# Patient Record
Sex: FEMALE | Race: WHITE | NOT HISPANIC OR LATINO | Employment: OTHER | URBAN - METROPOLITAN AREA
[De-identification: names, ages, dates, MRNs, and addresses within clinical notes are randomized per-mention and may not be internally consistent; named-entity substitution may affect disease eponyms.]

---

## 2022-12-06 ENCOUNTER — OFFICE VISIT (OUTPATIENT)
Dept: FAMILY MEDICINE | Facility: CLINIC | Age: 87
End: 2022-12-06
Payer: MEDICARE

## 2022-12-06 VITALS — DIASTOLIC BLOOD PRESSURE: 74 MMHG | SYSTOLIC BLOOD PRESSURE: 136 MMHG | HEART RATE: 67 BPM | OXYGEN SATURATION: 93 %

## 2022-12-06 DIAGNOSIS — M79.89 LEG SWELLING: ICD-10-CM

## 2022-12-06 DIAGNOSIS — F33.0 MILD EPISODE OF RECURRENT MAJOR DEPRESSIVE DISORDER: ICD-10-CM

## 2022-12-06 DIAGNOSIS — F03.90 DEMENTIA, UNSPECIFIED DEMENTIA SEVERITY, UNSPECIFIED DEMENTIA TYPE, UNSPECIFIED WHETHER BEHAVIORAL, PSYCHOTIC, OR MOOD DISTURBANCE OR ANXIETY: ICD-10-CM

## 2022-12-06 DIAGNOSIS — B37.2 CANDIDIASIS OF SKIN: ICD-10-CM

## 2022-12-06 DIAGNOSIS — R42 DIZZINESS: ICD-10-CM

## 2022-12-06 DIAGNOSIS — E03.9 ACQUIRED HYPOTHYROIDISM: ICD-10-CM

## 2022-12-06 DIAGNOSIS — E66.01 MORBID OBESITY DUE TO EXCESS CALORIES: ICD-10-CM

## 2022-12-06 DIAGNOSIS — Z00.00 ANNUAL PHYSICAL EXAM: Primary | ICD-10-CM

## 2022-12-06 PROCEDURE — 99205 OFFICE O/P NEW HI 60 MIN: CPT | Mod: S$GLB,,, | Performed by: NURSE PRACTITIONER

## 2022-12-06 PROCEDURE — 99205 PR OFFICE/OUTPT VISIT, NEW, LEVL V, 60-74 MIN: ICD-10-PCS | Mod: S$GLB,,, | Performed by: NURSE PRACTITIONER

## 2022-12-06 RX ORDER — METOPROLOL SUCCINATE 50 MG/1
50 TABLET, EXTENDED RELEASE ORAL DAILY
COMMUNITY
End: 2023-09-13 | Stop reason: ALTCHOICE

## 2022-12-06 RX ORDER — ESCITALOPRAM OXALATE 20 MG/1
20 TABLET ORAL DAILY
COMMUNITY
End: 2023-09-07 | Stop reason: SDUPTHER

## 2022-12-06 RX ORDER — LEVOTHYROXINE SODIUM 200 UG/1
200 TABLET ORAL
COMMUNITY
End: 2023-06-08 | Stop reason: SDUPTHER

## 2022-12-06 RX ORDER — POTASSIUM CHLORIDE 1500 MG/1
15 TABLET, EXTENDED RELEASE ORAL DAILY
COMMUNITY
End: 2023-10-10 | Stop reason: SDUPTHER

## 2022-12-06 RX ORDER — NYSTATIN 100000 [USP'U]/G
POWDER TOPICAL 4 TIMES DAILY
Qty: 120 G | Refills: 5 | Status: SHIPPED | OUTPATIENT
Start: 2022-12-06 | End: 2023-05-01 | Stop reason: SDUPTHER

## 2022-12-06 RX ORDER — FUROSEMIDE 20 MG/1
20 TABLET ORAL DAILY
COMMUNITY
End: 2023-04-21

## 2022-12-06 RX ORDER — MULTIVITAMIN
1 TABLET ORAL DAILY
COMMUNITY

## 2022-12-06 RX ORDER — MECLIZINE HYDROCHLORIDE 25 MG/1
25 TABLET ORAL 3 TIMES DAILY PRN
Qty: 60 TABLET | Refills: 2 | Status: SHIPPED | OUTPATIENT
Start: 2022-12-06

## 2022-12-06 RX ORDER — CALCIUM CARBONATE 600 MG
600 TABLET ORAL DAILY
COMMUNITY

## 2022-12-06 NOTE — PROGRESS NOTES
"  SUBJECTIVE:    Patient ID: Ghazala Hagen is a 87 y.o. female.    Chief Complaint: Establish Care (Bottles brought//Pt is here to get establish care with PCP//Pt already had her flu vaccine at [facility] in the past 4 months.//Discuss pna vaccine//Pt decline weight check//SCOTT )      Pt here for new pt appt. Former pt of PCP in [place]    Moved from [place] 4 months ago and now and living at [facility]- pt here with her granddaughter and grandson. Was living with daughter in [place] until her son in law got sick. Pt reports she's hopeful she can move back to [place] though family states this is not likely    Pt answers most of the questions however grandchildren tend to not agree with her answers. Her daughter who is a nurse sent in a note with family listing multiple c/o's- chronic leg swelling not much improved with lasix daily, chronic yeast infection under breasts/stomach, anxiety and lack of interest/motivation. 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 Family reports visual hallucinations though pt denies. Pt seems to disagree when family tries to report any symptoms. Pt reports she sleeps well and pt/family deny any behavior issues or agitation    Pt reports ambulates short distances with walker inside her apartment otherwise uses wheelchair. Stays in her room most of the day and sits in her recliner watching TV. Reports has no interest in participating in group activities or eating in dining mendes. Denies any falls this past year. Pt admits to chronic depression since her  . Has been on lexapro 20mg daily for years and she doesn't want to change medications.    Pt reports chronic rash to lower abdominal and breast folds- pt tells me former PCP treated her with oral diflucan "which didn't help at all". Reports using nystatin powder which has helped some    Has [facility] coming out weekly- hit dorsum of left foot on walker about 1 week ago and has small skin " tear      No visits with results within 6 Month(s) from this visit.   Latest known visit with results is:   No results found for any previous visit.       Past Medical History:   Diagnosis Date    Hypertension     Thyroid disease      Past Surgical History:   Procedure Laterality Date    CHOLECYSTECTOMY      HIP SURGERY Left     s/p CHRIS complicated by joint infection/spacer    knee replacement Right     KNEE SURGERY Left     TKR    SHOULDER SURGERY Right     TONSILLECTOMY       Family History   Problem Relation Age of Onset    COPD Mother     Cancer Father        Marital Status:   Alcohol History:  reports that she does not currently use alcohol.  Tobacco History:  reports that she has never smoked. She has never used smokeless tobacco.  Drug History:  reports no history of drug use.    Review of patient's allergies indicates:  No Known Allergies    Current Outpatient Medications:     calcium carbonate (OS-LYNNETTE) 600 mg calcium (1,500 mg) Tab, Take 600 mg by mouth once daily., Disp: , Rfl:     EScitalopram oxalate (LEXAPRO) 20 MG tablet, Take 20 mg by mouth once daily., Disp: , Rfl:     furosemide (LASIX) 20 MG tablet, Take 20 mg by mouth once daily., Disp: , Rfl:     KRILL OIL ORAL, Take by mouth., Disp: , Rfl:     levothyroxine (SYNTHROID) 200 MCG tablet, Take 200 mcg by mouth before breakfast., Disp: , Rfl:     metoprolol succinate (TOPROL-XL) 50 MG 24 hr tablet, Take 50 mg by mouth once daily., Disp: , Rfl:     multivitamin (ONE DAILY MULTIVITAMIN) per tablet, Take 1 tablet by mouth once daily., Disp: , Rfl:     potassium chloride (K-TAB) 20 mEq, Take 15 mEq by mouth once daily., Disp: , Rfl:     meclizine (ANTIVERT) 25 mg tablet, Take 1 tablet (25 mg total) by mouth 3 (three) times daily as needed for Dizziness., Disp: 60 tablet, Rfl: 2    nystatin (MYCOSTATIN) powder, Apply topically 4 (four) times daily., Disp: 120 g, Rfl: 5    Review of Systems   Constitutional:  Negative for appetite change, chills and  fever.   HENT:  Negative for sore throat and trouble swallowing.    Eyes:  Negative for visual disturbance.   Respiratory:  Negative for cough, shortness of breath and wheezing.    Cardiovascular:  Positive for leg swelling. Negative for chest pain and palpitations.   Gastrointestinal:  Negative for abdominal pain, constipation and diarrhea.   Genitourinary:  Positive for dysuria (only if she drinks soft drinks, previously diagnosed with IC). Negative for frequency, hematuria and urgency.   Musculoskeletal:  Negative for back pain and gait problem.   Skin:  Negative for rash.   Neurological:  Positive for dizziness (reports chronic dizziness controlled with taking meclizine once or twice daily). Negative for syncope, speech difficulty, numbness and headaches.   Psychiatric/Behavioral:  Positive for confusion and dysphoric mood. Negative for agitation, behavioral problems and sleep disturbance. The patient is not nervous/anxious.         Objective:      Vitals:    12/06/22 1454   BP: 136/74   Pulse: 67   SpO2: (!) 93%     Physical Exam  Vitals reviewed.   Constitutional:       General: She is not in acute distress.     Appearance: She is well-developed.      Comments: Morbidly obese elderly WF sitting in WC   HENT:      Head: Normocephalic and atraumatic.      Right Ear: Tympanic membrane and ear canal normal.      Left Ear: Tympanic membrane and ear canal normal.      Mouth/Throat:      Pharynx: No posterior oropharyngeal erythema.   Neck:      Vascular: No carotid bruit.   Cardiovascular:      Rate and Rhythm: Normal rate and regular rhythm.      Pulses:           Dorsalis pedis pulses are 2+ on the right side and 2+ on the left side.      Heart sounds: No murmur heard.  Pulmonary:      Effort: Pulmonary effort is normal. No respiratory distress.      Breath sounds: Normal breath sounds. No wheezing or rales.   Abdominal:      General: There is no distension.      Palpations: Abdomen is soft.      Tenderness: There  "is no abdominal tenderness.   Musculoskeletal:      Cervical back: Neck supple.      Right lower leg: Edema (2+ pitting edema bilat lower legs from mid calves to feet, no erythema) present.      Left lower leg: Edema present.        Feet:    Feet:      Right foot:      Skin integrity: No ulcer or erythema.      Toenail Condition: Right toenails are abnormally thick and long.      Left foot:      Skin integrity: No ulcer or erythema.      Toenail Condition: Left toenails are abnormally thick and long.   Lymphadenopathy:      Cervical: No cervical adenopathy.   Skin:     General: Skin is warm and dry.      Findings: Rash present.          Neurological:      General: No focal deficit present.      Mental Status: She is alert.      Gait: Gait normal.   Psychiatric:         Mood and Affect: Mood normal.         Behavior: Behavior normal.         Thought Content: Thought content is not paranoid or delusional.         Cognition and Memory: Cognition is impaired.      Comments: Pt pleasant though adamant during visit that her family not contradict her and states she can "speak for herself'     MINI-MENTAL STATE EXAM    What is the (year), (season), (date), (day), (month)?5 points  Where are we (state), ([place]), (town), (hospital), (floor)? 4 points  Name 3 objects: 1 second to say each, then ask the patient to repeat all three after you have said them.  Repeat initially until he/she learns all three. 3 points  Serial 7's. 1 point for each answer, stop after 5.  Alternatively, spell "world" backwards.  Must be in the correct sequence.  5 points  Show 2 common objects (pencil and watch).  Ask patient to name. 2 points  Ask the patient to repeat No ifs, ands or buts. 1 point  Follow a 3 stage command: "Take this paper in your right hand, fold it in half, and put it on the floor". 3 points  Read and obey: "Close your eyes". 1 poinr  Ask the patient to recall the three words you previously asked. 1 points  Give pt. paper and " ask to write a sentence. 1 point  Show pt. drawing of intersecting pentagons. Ask pt. to draw. 0 point  What was the mini mental exam score? 26 /30  Level of consciousness: alert  Describe if abnormal:   Fund of knowledge: Normal  General appearance: Normal      Assessment:       1. Annual physical exam    2. Leg swelling    3. Dementia, unspecified dementia severity, unspecified dementia type, unspecified whether behavioral, psychotic, or mood disturbance or anxiety    4. Acquired hypothyroidism    5. Mild episode of recurrent major depressive disorder    6. Candidiasis of skin    7. Dizziness    8. Morbid obesity due to excess calories           Plan:       Annual physical exam  Comments:  baseline labs ordered    Leg swelling  Comments:  discussed imp of elevation with pt/family- will check labs but for now continue with lasix 20mg daily, recommend compression socks  Orders:  -     CBC Auto Differential; Future; Expected date: 12/06/2022  -     Comprehensive Metabolic Panel; Future; Expected date: 12/06/2022  -     Lipid Panel; Future; Expected date: 12/06/2022  -     Microalbumin/Creatinine Ratio, Urine; Future; Expected date: 12/06/2022  -     Urinalysis, Reflex to Urine Culture Urine, Clean Catch; Future; Expected date: 12/06/2022    Dementia, unspecified dementia severity, unspecified dementia type, unspecified whether behavioral, psychotic, or mood disturbance or anxiety  Comments:  MMSE 26/30 today. discussed with pt/family adding donepezil and/or neuro referral- pt decliens any medication though family woudl like to discuss further     Acquired hypothyroidism  Comments:  labs ordered  Orders:  -     CBC Auto Differential; Future; Expected date: 12/06/2022  -     Comprehensive Metabolic Panel; Future; Expected date: 12/06/2022  -     Lipid Panel; Future; Expected date: 12/06/2022  -     Microalbumin/Creatinine Ratio, Urine; Future; Expected date: 12/06/2022  -     Urinalysis, Reflex to Urine Culture Urine,  Clean Catch; Future; Expected date: 12/06/2022  -     TSH; Future; Expected date: 12/06/2022  -     T4, Free; Future; Expected date: 12/06/2022    Mild episode of recurrent major depressive disorder  Comments:  pt admits to ongoing depression, discussed adding additional antidepressant vs switching her to another med, she declines either    Candidiasis of skin  Comments:  pt declines diflucan, recommend cleaning with soap/water daily and dry skin well. nystatin powder prn and if really excoriated use aron's cream  Orders:  -     nystatin (MYCOSTATIN) powder; Apply topically 4 (four) times daily.  Dispense: 120 g; Refill: 5    Dizziness  Comments:  reports chronic dizziness controlled with meclizine  Orders:  -     meclizine (ANTIVERT) 25 mg tablet; Take 1 tablet (25 mg total) by mouth 3 (three) times daily as needed for Dizziness.  Dispense: 60 tablet; Refill: 2    Morbid obesity due to excess calories  Comments:  encouraged pt to increase activity by walking to dining mendes, etc    Follow up in about 6 weeks (around 1/17/2023) for thyroid/depression/leg swelling.        12/6/2022 Ivanna Cutler NP

## 2022-12-16 ENCOUNTER — TELEPHONE (OUTPATIENT)
Dept: FAMILY MEDICINE | Facility: CLINIC | Age: 87
End: 2022-12-16

## 2022-12-16 ENCOUNTER — LAB VISIT (OUTPATIENT)
Dept: LAB | Facility: HOSPITAL | Age: 87
End: 2022-12-16
Attending: NURSE PRACTITIONER
Payer: MEDICARE

## 2022-12-16 DIAGNOSIS — M15.0 PRIMARY GENERALIZED HYPERTROPHIC OSTEOARTHROSIS: Primary | ICD-10-CM

## 2022-12-16 DIAGNOSIS — I87.2 PERIPHERAL VENOUS INSUFFICIENCY: ICD-10-CM

## 2022-12-16 DIAGNOSIS — I51.9 MYXEDEMA HEART DISEASE: ICD-10-CM

## 2022-12-16 DIAGNOSIS — E03.9 MYXEDEMA HEART DISEASE: ICD-10-CM

## 2022-12-16 PROCEDURE — 84439 ASSAY OF FREE THYROXINE: CPT | Performed by: NURSE PRACTITIONER

## 2022-12-16 PROCEDURE — 80053 COMPREHEN METABOLIC PANEL: CPT | Performed by: NURSE PRACTITIONER

## 2022-12-16 PROCEDURE — 80061 LIPID PANEL: CPT | Performed by: NURSE PRACTITIONER

## 2022-12-16 PROCEDURE — 84443 ASSAY THYROID STIM HORMONE: CPT | Performed by: NURSE PRACTITIONER

## 2022-12-16 PROCEDURE — 85025 COMPLETE CBC W/AUTO DIFF WBC: CPT | Performed by: NURSE PRACTITIONER

## 2022-12-16 NOTE — TELEPHONE ENCOUNTER
----- Message from Dalia Garcia sent at 12/16/2022 11:33 AM CST -----   9:20 Chantelle with Home [facility]. She was trying to get a urine from her. She not get one. She will go out later and give her a hat and go back Monday pt's #  [phone] GH

## 2022-12-17 LAB
ALBUMIN SERPL BCP-MCNC: 3.3 G/DL (ref 3.5–5.2)
ALP SERPL-CCNC: 58 U/L (ref 55–135)
ALT SERPL W/O P-5'-P-CCNC: 9 U/L (ref 10–44)
ANION GAP SERPL CALC-SCNC: 13 MMOL/L (ref 8–16)
AST SERPL-CCNC: 19 U/L (ref 10–40)
BASOPHILS # BLD AUTO: 0.02 K/UL (ref 0–0.2)
BASOPHILS NFR BLD: 0.3 % (ref 0–1.9)
BILIRUB SERPL-MCNC: 0.5 MG/DL (ref 0.1–1)
BUN SERPL-MCNC: 7 MG/DL (ref 8–23)
CALCIUM SERPL-MCNC: 9 MG/DL (ref 8.7–10.5)
CHLORIDE SERPL-SCNC: 102 MMOL/L (ref 95–110)
CHOLEST SERPL-MCNC: 149 MG/DL (ref 120–199)
CHOLEST/HDLC SERPL: 3.5 {RATIO} (ref 2–5)
CO2 SERPL-SCNC: 26 MMOL/L (ref 23–29)
CREAT SERPL-MCNC: 0.6 MG/DL (ref 0.5–1.4)
DIFFERENTIAL METHOD: ABNORMAL
EOSINOPHIL # BLD AUTO: 0.1 K/UL (ref 0–0.5)
EOSINOPHIL NFR BLD: 1.6 % (ref 0–8)
ERYTHROCYTE [DISTWIDTH] IN BLOOD BY AUTOMATED COUNT: 17.6 % (ref 11.5–14.5)
EST. GFR  (NO RACE VARIABLE): >60 ML/MIN/1.73 M^2
GLUCOSE SERPL-MCNC: 107 MG/DL (ref 70–110)
HCT VFR BLD AUTO: 39.1 % (ref 37–48.5)
HDLC SERPL-MCNC: 43 MG/DL (ref 40–75)
HDLC SERPL: 28.9 % (ref 20–50)
HGB BLD-MCNC: 11.5 G/DL (ref 12–16)
IMM GRANULOCYTES # BLD AUTO: 0.03 K/UL (ref 0–0.04)
IMM GRANULOCYTES NFR BLD AUTO: 0.4 % (ref 0–0.5)
LDLC SERPL CALC-MCNC: 92.6 MG/DL (ref 63–159)
LYMPHOCYTES # BLD AUTO: 1.6 K/UL (ref 1–4.8)
LYMPHOCYTES NFR BLD: 23.2 % (ref 18–48)
MCH RBC QN AUTO: 27.4 PG (ref 27–31)
MCHC RBC AUTO-ENTMCNC: 29.4 G/DL (ref 32–36)
MCV RBC AUTO: 93 FL (ref 82–98)
MONOCYTES # BLD AUTO: 0.7 K/UL (ref 0.3–1)
MONOCYTES NFR BLD: 9.6 % (ref 4–15)
NEUTROPHILS # BLD AUTO: 4.5 K/UL (ref 1.8–7.7)
NEUTROPHILS NFR BLD: 64.9 % (ref 38–73)
NONHDLC SERPL-MCNC: 106 MG/DL
NRBC BLD-RTO: 0 /100 WBC
PLATELET # BLD AUTO: 272 K/UL (ref 150–450)
PMV BLD AUTO: 10.9 FL (ref 9.2–12.9)
POTASSIUM SERPL-SCNC: 4.1 MMOL/L (ref 3.5–5.1)
PROT SERPL-MCNC: 6.6 G/DL (ref 6–8.4)
RBC # BLD AUTO: 4.19 M/UL (ref 4–5.4)
SODIUM SERPL-SCNC: 141 MMOL/L (ref 136–145)
T4 FREE SERPL-MCNC: 1.24 NG/DL (ref 0.71–1.51)
TRIGL SERPL-MCNC: 67 MG/DL (ref 30–150)
TSH SERPL DL<=0.005 MIU/L-ACNC: 1.03 UIU/ML (ref 0.4–4)
WBC # BLD AUTO: 6.91 K/UL (ref 3.9–12.7)

## 2022-12-19 ENCOUNTER — DOCUMENT SCAN (OUTPATIENT)
Dept: HOME HEALTH SERVICES | Facility: HOSPITAL | Age: 87
End: 2022-12-19
Payer: MEDICARE

## 2022-12-19 ENCOUNTER — TELEPHONE (OUTPATIENT)
Dept: FAMILY MEDICINE | Facility: CLINIC | Age: 87
End: 2022-12-19

## 2022-12-19 NOTE — TELEPHONE ENCOUNTER
----- Message from Ivanna Frye sent at 12/19/2022  9:08 AM CST -----  Contact: Sydney Mckeon does want Ivanna to take over her medications. Pt has a sore on the back of her left leg by her knee. Can you have HH look at that for her. Sydney @[phone]

## 2022-12-19 NOTE — TELEPHONE ENCOUNTER
Granddaughter states pt is feeling dizzy and very sick.states she has ear pain  Advised she needs to go to . We cant just send something in. Sydney voiced understanding.

## 2022-12-21 ENCOUNTER — TELEPHONE (OUTPATIENT)
Dept: FAMILY MEDICINE | Facility: CLINIC | Age: 87
End: 2022-12-21

## 2022-12-21 NOTE — TELEPHONE ENCOUNTER
Spoke with pts granddaughter Sydney and let her know per Dr. Hernandes all labs from  looked normal.

## 2022-12-30 PROCEDURE — G0179 PR HOME HEALTH MD RECERTIFICATION: ICD-10-PCS | Mod: ,,, | Performed by: NURSE PRACTITIONER

## 2022-12-30 PROCEDURE — G0179 MD RECERTIFICATION HHA PT: HCPCS | Mod: ,,, | Performed by: NURSE PRACTITIONER

## 2023-01-05 ENCOUNTER — DOCUMENT SCAN (OUTPATIENT)
Dept: HOME HEALTH SERVICES | Facility: HOSPITAL | Age: 88
End: 2023-01-05
Payer: MEDICARE

## 2023-01-19 ENCOUNTER — TELEPHONE (OUTPATIENT)
Dept: FAMILY MEDICINE | Facility: CLINIC | Age: 88
End: 2023-01-19

## 2023-01-19 NOTE — TELEPHONE ENCOUNTER
Spoke to pts grandsujit and she stated the pt is feeling dizzy and stomach is upset. Pts daughter told granddaughter it was a good time to go since she hasnt been feeling to good but pt still wants to reschedule appointment . Pt rescheduled to 2/6 at 3:40pm

## 2023-01-19 NOTE — TELEPHONE ENCOUNTER
----- Message from Patricia Leahy sent at 1/19/2023 10:02 AM CST -----  Pt would like to cancel and reschedule appt. [phone] (Daniela)

## 2023-01-26 ENCOUNTER — TELEPHONE (OUTPATIENT)
Dept: FAMILY MEDICINE | Facility: CLINIC | Age: 88
End: 2023-01-26

## 2023-01-26 ENCOUNTER — OUTSIDE PLACE OF SERVICE (OUTPATIENT)
Dept: FAMILY MEDICINE | Facility: CLINIC | Age: 88
End: 2023-01-26
Payer: MEDICARE

## 2023-01-26 DIAGNOSIS — E66.01 MORBID OBESITY DUE TO EXCESS CALORIES: ICD-10-CM

## 2023-01-26 DIAGNOSIS — F33.0 MILD EPISODE OF RECURRENT MAJOR DEPRESSIVE DISORDER: Primary | ICD-10-CM

## 2023-01-26 DIAGNOSIS — I87.2 CHRONIC VENOUS INSUFFICIENCY: ICD-10-CM

## 2023-01-26 DIAGNOSIS — F03.90 DEMENTIA: ICD-10-CM

## 2023-01-26 DIAGNOSIS — R42 VERTIGO: Primary | ICD-10-CM

## 2023-01-26 DIAGNOSIS — E03.9 ACQUIRED HYPOTHYROIDISM: ICD-10-CM

## 2023-01-26 DIAGNOSIS — F33.0 MILD EPISODE OF RECURRENT MAJOR DEPRESSIVE DISORDER: ICD-10-CM

## 2023-01-26 PROCEDURE — 99349 PR HOME VISIT,ESTAB PATIENT,LEVEL III: ICD-10-PCS | Mod: S$GLB,,, | Performed by: NURSE PRACTITIONER

## 2023-01-26 PROCEDURE — 99349 HOME/RES VST EST MOD MDM 40: CPT | Mod: S$GLB,,, | Performed by: NURSE PRACTITIONER

## 2023-01-26 RX ORDER — VENLAFAXINE HYDROCHLORIDE 37.5 MG/1
37.5 CAPSULE, EXTENDED RELEASE ORAL DAILY
Qty: 30 CAPSULE | Refills: 11 | Status: SHIPPED | OUTPATIENT
Start: 2023-01-26 | End: 2023-02-16

## 2023-01-26 NOTE — TELEPHONE ENCOUNTER
Spoke with pt's granddaughter Sydney today by phone during rounds at [facility]. Sydney reports she feels pt is depressed and the lexapro is no longer as effective. Pt stays in her room and doesn't want to get involved in activities. Advised we can add dose venlafaxine 37.5mg and monitor- call me for any worsening in symptoms. Sydney advised given I saw pt today we can cancel next weeks appt and will round on pt at [facility]. She agrees

## 2023-02-16 ENCOUNTER — TELEPHONE (OUTPATIENT)
Dept: FAMILY MEDICINE | Facility: CLINIC | Age: 88
End: 2023-02-16

## 2023-02-16 NOTE — TELEPHONE ENCOUNTER
Spoke with Alayna, pt's daughter in regards to message sent. Alayna states that pt was having some hallucination since starting the Venlafaxine. Alayna states that the pt D/C medication about 7 days. States that she is back to normal. Alayna states that the pt does not want to be prescribe anything else at this moment, pt will continue with her Escitalopram.    Please be advised.

## 2023-02-16 NOTE — TELEPHONE ENCOUNTER
----- Message from Ana Lilia Herrera LPN sent at 2/16/2023 12:55 PM CST -----    ----- Message -----  From: Patricia Leahy  Sent: 2/16/2023  12:49 PM CST  To: Ivanna Cutler Staff    Pt daughter Alayna called and would like to speak to a nurse about side effects of her EFFEXOR-XR.     [phone]- Alayna

## 2023-03-13 PROBLEM — Z00.00 ANNUAL PHYSICAL EXAM: Status: RESOLVED | Noted: 2022-12-06 | Resolved: 2023-03-13

## 2023-04-03 ENCOUNTER — EXTERNAL HOME HEALTH (OUTPATIENT)
Dept: HOME HEALTH SERVICES | Facility: HOSPITAL | Age: 88
End: 2023-04-03
Payer: MEDICARE

## 2023-04-10 ENCOUNTER — DOCUMENT SCAN (OUTPATIENT)
Dept: HOME HEALTH SERVICES | Facility: HOSPITAL | Age: 88
End: 2023-04-10
Payer: MEDICARE

## 2023-04-11 ENCOUNTER — TELEPHONE (OUTPATIENT)
Dept: FAMILY MEDICINE | Facility: CLINIC | Age: 88
End: 2023-04-11

## 2023-04-11 NOTE — TELEPHONE ENCOUNTER
Spoke to pts granddaughter and she stated pt is over weight and in a wheelchair and cant walk very well. Pt granddaughter stated there is a yeast infection on the pts stomach and under pts breast. Pts granddaughters stated pts gets this every few months and ian has tried it with Nystatin. Pts granddaughter wants ian to get see the rash because it is bigger than normal

## 2023-04-11 NOTE — TELEPHONE ENCOUNTER
Spoke Pts grand daughter and let her know per Ivanna verbatim below. Pts grand daughter verbalized understanding

## 2023-04-11 NOTE — TELEPHONE ENCOUNTER
----- Message from Sarah Constantino sent at 4/11/2023  3:37 PM CDT -----  Patient granddaughter (Laila)called and stated that the patient has a yeast infection under her stomach and she was wondering if Ivanna came come by the facility to see her ? She stated that it is red and raw and is in a lot of pain. Please give her a call back at [phone]

## 2023-04-13 ENCOUNTER — OFFICE VISIT (OUTPATIENT)
Dept: FAMILY MEDICINE | Facility: CLINIC | Age: 88
End: 2023-04-13
Payer: MEDICARE

## 2023-04-13 ENCOUNTER — TELEPHONE (OUTPATIENT)
Dept: FAMILY MEDICINE | Facility: CLINIC | Age: 88
End: 2023-04-13

## 2023-04-13 VITALS — OXYGEN SATURATION: 94 % | SYSTOLIC BLOOD PRESSURE: 134 MMHG | HEART RATE: 61 BPM | DIASTOLIC BLOOD PRESSURE: 68 MMHG

## 2023-04-13 DIAGNOSIS — I89.0 LYMPHEDEMA: ICD-10-CM

## 2023-04-13 DIAGNOSIS — B37.2 CANDIDIASIS OF SKIN: Primary | ICD-10-CM

## 2023-04-13 DIAGNOSIS — F03.A0 MILD DEMENTIA WITHOUT BEHAVIORAL DISTURBANCE, PSYCHOTIC DISTURBANCE, MOOD DISTURBANCE, OR ANXIETY, UNSPECIFIED DEMENTIA TYPE: ICD-10-CM

## 2023-04-13 PROCEDURE — 99214 OFFICE O/P EST MOD 30 MIN: CPT | Mod: S$GLB,,, | Performed by: NURSE PRACTITIONER

## 2023-04-13 PROCEDURE — 99214 PR OFFICE/OUTPT VISIT, EST, LEVL IV, 30-39 MIN: ICD-10-PCS | Mod: S$GLB,,, | Performed by: NURSE PRACTITIONER

## 2023-04-13 RX ORDER — FLUCONAZOLE 100 MG/1
100 TABLET ORAL DAILY
Qty: 7 TABLET | Refills: 0 | Status: SHIPPED | OUTPATIENT
Start: 2023-04-13 | End: 2023-05-01 | Stop reason: SDUPTHER

## 2023-04-13 RX ORDER — KETOCONAZOLE 20 MG/ML
SHAMPOO, SUSPENSION TOPICAL
Qty: 120 ML | Refills: 2 | Status: SHIPPED | OUTPATIENT
Start: 2023-04-13

## 2023-04-13 RX ORDER — METOPROLOL TARTRATE 50 MG/1
50 TABLET ORAL
COMMUNITY
Start: 2022-06-28 | End: 2023-09-13 | Stop reason: SDUPTHER

## 2023-04-13 NOTE — TELEPHONE ENCOUNTER
Spoke to pts grand daughter and let her know we do apologized we thought pt lived at [facility] and that is were Dr. Hernandes went and We are sorry and offered pt appointment for 3:20 pm with Ivanna. Pts grand daughter accepted appointment

## 2023-04-13 NOTE — TELEPHONE ENCOUNTER
----- Message from Ivette Fernandes MA sent at 4/13/2023  8:35 AM CDT -----  Regarding: FW: ángel patient    ----- Message -----  From: Patricia Rolle MA  Sent: 4/13/2023   8:29 AM CDT  To: Joseph Hernandes Staff  Subject: jean-pierrek patient                                  Patient needs to be seen today in clinic for abdominal and under breast yeast infection. Grand daughter claims dr. Hernandes was suppose to see her yesterday but did not. Grand daughter claims she has called us about 3 times for this request.  [phone]

## 2023-04-13 NOTE — PATIENT INSTRUCTIONS
Use ketoconazole shampoo to clean skin folds and dry well daily for the next week then use 2-3 times a week. May use nystatin powder after bathing, avoid cornstarch.

## 2023-04-13 NOTE — TELEPHONE ENCOUNTER
Spoke with [facility] advised past plan of care orders need to be faxed to [phone]. They are refaxing plan of care.

## 2023-04-13 NOTE — PROGRESS NOTES
SUBJECTIVE:    Patient ID: Ghazala Hagen is a 87 y.o. female.    Chief Complaint: Rash (No bottles//Pt is c/o yeast infection under the breast and abd area on going over 1 month now//Pt has been using the nystatin powder with no help with the yeast infection.//Discuss pna vaccine//Pt decline weight and height check.//SCOTT   )    Patient here for sick visit.  Patient here with her daughter today.  She is reporting worsening recurrent yeast rash to lower abdominal/groin folds as well as under breasts.  Daughter states the patient has had this rash intermittently for years however it most recently has gotten much worse.  Patient is now living at the [facility] and staff normally assist her to shower twice a week.  Daughter tells me that patient will often decline the shower however.  When patient was living with family they were cleaning the skin better and were able to manage the yeast infections.  Patient complains of itching and burning to the skin folds.  Patient has been using cornstarch powder as well as nystatin powder intermittently  Patient spends most of her day and night sitting in her recliner chair with her feet hanging down-she rarely leaves her room at the [facility].  She has chronic lower extremity edema which has not improved much with diuretics.  Patient with worsening memory and family feels she is depressed.  She has been on escitalopram for years and most recently venlafaxine was added however patient states she hallucinated after the 2nd dose so that medication was stopped.  Patient denies depression though family still feels she is.  Patient was previously living with her other daughter in [place] though had to move here due to her daughter's failing health      Lab Visit on 12/19/2022   Component Date Value Ref Range Status    Microalbumin, Urine 12/19/2022 <6.0  ug/mL Final    Creatinine, Urine 12/19/2022 104.2  15.0 - 325.0 mg/dL Final    Microalb/Creat Ratio 12/19/2022  Unable to calculate  0.0 - 30.0 ug/mg Final    Specimen UA 12/19/2022 Urine, Clean Catch   Final    Color, UA 12/19/2022 Yellow  Yellow, Straw, Bridget Final    Appearance, UA 12/19/2022 Clear  Clear Final    pH, UA 12/19/2022 8.5 (A)  5.0 - 8.0 Final    Specific Gravity, UA 12/19/2022 1.015  1.005 - 1.030 Final    Protein, UA 12/19/2022 Negative  Negative Final    Glucose, UA 12/19/2022 Negative  Negative Final    Ketones, UA 12/19/2022 Negative  Negative Final    Bilirubin (UA) 12/19/2022 Negative  Negative Final    Occult Blood UA 12/19/2022 Negative  Negative Final    Nitrite, UA 12/19/2022 Negative  Negative Final    Urobilinogen, UA 12/19/2022 1.0  <2.0 EU/dL Final    Leukocytes, UA 12/19/2022 Negative  Negative Final   Lab Visit on 12/16/2022   Component Date Value Ref Range Status    WBC 12/16/2022 6.91  3.90 - 12.70 K/uL Final    RBC 12/16/2022 4.19  4.00 - 5.40 M/uL Final    Hemoglobin 12/16/2022 11.5 (L)  12.0 - 16.0 g/dL Final    Hematocrit 12/16/2022 39.1  37.0 - 48.5 % Final    MCV 12/16/2022 93  82 - 98 fL Final    MCH 12/16/2022 27.4  27.0 - 31.0 pg Final    MCHC 12/16/2022 29.4 (L)  32.0 - 36.0 g/dL Final    RDW 12/16/2022 17.6 (H)  11.5 - 14.5 % Final    Platelets 12/16/2022 272  150 - 450 K/uL Final    MPV 12/16/2022 10.9  9.2 - 12.9 fL Final    Immature Granulocytes 12/16/2022 0.4  0.0 - 0.5 % Final    Gran # (ANC) 12/16/2022 4.5  1.8 - 7.7 K/uL Final    Immature Grans (Abs) 12/16/2022 0.03  0.00 - 0.04 K/uL Final    Lymph # 12/16/2022 1.6  1.0 - 4.8 K/uL Final    Mono # 12/16/2022 0.7  0.3 - 1.0 K/uL Final    Eos # 12/16/2022 0.1  0.0 - 0.5 K/uL Final    Baso # 12/16/2022 0.02  0.00 - 0.20 K/uL Final    nRBC 12/16/2022 0  0 /100 WBC Final    Gran % 12/16/2022 64.9  38.0 - 73.0 % Final    Lymph % 12/16/2022 23.2  18.0 - 48.0 % Final    Mono % 12/16/2022 9.6  4.0 - 15.0 % Final    Eosinophil % 12/16/2022 1.6  0.0 - 8.0 % Final    Basophil % 12/16/2022 0.3  0.0 - 1.9 % Final    Differential Method  12/16/2022 Automated   Final    Sodium 12/16/2022 141  136 - 145 mmol/L Final    Potassium 12/16/2022 4.1  3.5 - 5.1 mmol/L Final    Chloride 12/16/2022 102  95 - 110 mmol/L Final    CO2 12/16/2022 26  23 - 29 mmol/L Final    Glucose 12/16/2022 107  70 - 110 mg/dL Final    BUN 12/16/2022 7 (L)  8 - 23 mg/dL Final    Creatinine 12/16/2022 0.6  0.5 - 1.4 mg/dL Final    Calcium 12/16/2022 9.0  8.7 - 10.5 mg/dL Final    Total Protein 12/16/2022 6.6  6.0 - 8.4 g/dL Final    Albumin 12/16/2022 3.3 (L)  3.5 - 5.2 g/dL Final    Total Bilirubin 12/16/2022 0.5  0.1 - 1.0 mg/dL Final    Alkaline Phosphatase 12/16/2022 58  55 - 135 U/L Final    AST 12/16/2022 19  10 - 40 U/L Final    ALT 12/16/2022 9 (L)  10 - 44 U/L Final    Anion Gap 12/16/2022 13  8 - 16 mmol/L Final    eGFR 12/16/2022 >60.0  >60 mL/min/1.73 m^2 Final    Free T4 12/16/2022 1.24  0.71 - 1.51 ng/dL Final    Cholesterol 12/16/2022 149  120 - 199 mg/dL Final    Triglycerides 12/16/2022 67  30 - 150 mg/dL Final    HDL 12/16/2022 43  40 - 75 mg/dL Final    LDL Cholesterol 12/16/2022 92.6  63.0 - 159.0 mg/dL Final    HDL/Cholesterol Ratio 12/16/2022 28.9  20.0 - 50.0 % Final    Total Cholesterol/HDL Ratio 12/16/2022 3.5  2.0 - 5.0 Final    Non-HDL Cholesterol 12/16/2022 106  mg/dL Final    TSH 12/16/2022 1.032  0.400 - 4.000 uIU/mL Final       Past Medical History:   Diagnosis Date    Hypertension     Thyroid disease      Past Surgical History:   Procedure Laterality Date    CHOLECYSTECTOMY      HIP SURGERY Left     s/p CHRIS complicated by joint infection/spacer    knee replacement Right     KNEE SURGERY Left     TKR    SHOULDER SURGERY Right     TONSILLECTOMY       Family History   Problem Relation Age of Onset    COPD Mother     Cancer Father        The CVD Risk score (RAMESH'Tamy, et al., 2008) failed to calculate for the following reasons:    The 2008 CVD risk score is only valid for ages 30 to 74     Marital Status:   Alcohol History:  reports that she  does not currently use alcohol.  Tobacco History:  reports that she has never smoked. She has never been exposed to tobacco smoke. She has never used smokeless tobacco.  Drug History:  reports no history of drug use.    Health Maintenance Topics with due status: Not Due       Topic Last Completion Date    Lipid Panel 12/16/2022       There is no immunization history on file for this patient.    Review of patient's allergies indicates:  No Known Allergies    Current Outpatient Medications:     metoprolol tartrate (LOPRESSOR) 50 MG tablet, Take 50 mg by mouth., Disp: , Rfl:     calcium carbonate (OS-LYNNETTE) 600 mg calcium (1,500 mg) Tab, Take 600 mg by mouth once daily., Disp: , Rfl:     EScitalopram oxalate (LEXAPRO) 20 MG tablet, Take 20 mg by mouth once daily., Disp: , Rfl:     fluconazole (DIFLUCAN) 100 MG tablet, Take 1 tablet (100 mg total) by mouth once daily., Disp: 7 tablet, Rfl: 0    furosemide (LASIX) 20 MG tablet, Take 20 mg by mouth once daily., Disp: , Rfl:     ketoconazole (NIZORAL) 2 % shampoo, Lather shampoo and clean skin folds daily for 1 week then 2-3 times a week, Disp: 120 mL, Rfl: 2    KRILL OIL ORAL, Take by mouth., Disp: , Rfl:     levothyroxine (SYNTHROID) 200 MCG tablet, Take 200 mcg by mouth before breakfast., Disp: , Rfl:     meclizine (ANTIVERT) 25 mg tablet, Take 1 tablet (25 mg total) by mouth 3 (three) times daily as needed for Dizziness., Disp: 60 tablet, Rfl: 2    metoprolol succinate (TOPROL-XL) 50 MG 24 hr tablet, Take 50 mg by mouth once daily., Disp: , Rfl:     multivitamin (ONE DAILY MULTIVITAMIN) per tablet, Take 1 tablet by mouth once daily., Disp: , Rfl:     nystatin (MYCOSTATIN) powder, Apply topically 4 (four) times daily., Disp: 120 g, Rfl: 5    potassium chloride (K-TAB) 20 mEq, Take 15 mEq by mouth once daily., Disp: , Rfl:     Review of Systems   Constitutional:  Negative for appetite change, chills and fever.   Eyes:  Negative for visual disturbance.   Respiratory:   Negative for cough, shortness of breath and wheezing.    Cardiovascular:  Positive for leg swelling. Negative for chest pain and palpitations.   Gastrointestinal:  Negative for abdominal pain, constipation and diarrhea.   Genitourinary:  Negative for dysuria, frequency, hematuria and urgency.        Wears diaper for urinary incontinence   Musculoskeletal:  Negative for back pain and gait problem.   Skin:  Positive for rash.   Neurological:  Positive for dizziness (reports chronic dizziness controlled with taking meclizine once or twice daily). Negative for syncope, speech difficulty, numbness and headaches.   Psychiatric/Behavioral:  Positive for confusion and dysphoric mood (per family). Negative for agitation, behavioral problems and sleep disturbance. The patient is not nervous/anxious.         Objective:      Vitals:    04/13/23 1549   BP: 134/68   Pulse: 61   SpO2: (!) 94%     Physical Exam  Vitals reviewed.   Constitutional:       General: She is not in acute distress.     Appearance: She is well-developed.      Comments: Morbidly obese elderly WF sitting in WC   HENT:      Head: Normocephalic and atraumatic.   Neck:      Vascular: No carotid bruit.   Cardiovascular:      Rate and Rhythm: Normal rate and regular rhythm.      Heart sounds: No murmur heard.  Pulmonary:      Effort: Pulmonary effort is normal. No respiratory distress.      Breath sounds: Normal breath sounds. No wheezing or rales.   Abdominal:      General: There is no distension.      Palpations: Abdomen is soft.      Tenderness: There is no abdominal tenderness.   Musculoskeletal:      Cervical back: Neck supple.      Right lower leg: Edema (2+ pitting edema bilat lower legs from mid calves to feet with thickened peau d'orange skin changes, no erythema, + varicose veins) present.      Left lower leg: Edema present.   Lymphadenopathy:      Cervical: No cervical adenopathy.   Skin:     General: Skin is warm and dry.      Findings: Rash present.           Neurological:      General: No focal deficit present.      Mental Status: She is alert.      Gait: Gait normal.   Psychiatric:         Mood and Affect: Mood normal.         Behavior: Behavior normal.         Thought Content: Thought content is not paranoid or delusional.         Cognition and Memory: Cognition is impaired.         Assessment:       1. Candidiasis of skin    2. Lymphedema    3. Mild dementia without behavioral disturbance, psychotic disturbance, mood disturbance, or anxiety, unspecified dementia type           Plan:       1. Candidiasis of skin  -will treat with oral Diflucan however discussed with patient/daughter if we can not keep her skin clean and dry this rash is going to be recurrent.  Recommend they washed skin with ketoconazole shampoo daily for the next week and dry skin well- Use 2 to 3 times a week after 1st week.  Daughter advised if rash is not improving I would recommend referral to derm.  Will order home health nurse to come out and check on her and home health aide to help with bathing  -     fluconazole (DIFLUCAN) 100 MG tablet; Take 1 tablet (100 mg total) by mouth once daily.  Dispense: 7 tablet; Refill: 0  -     ketoconazole (NIZORAL) 2 % shampoo; Lather shampoo and clean skin folds daily for 1 week then 2-3 times a week  Dispense: 120 mL; Refill: 2  -     Ambulatory referral/consult to Home Health; Future; Expected date: 04/14/2023    2. Lymphedema  -chronic lymphedema changes, patient admits to sitting in recliner chair all day long with her feet down so edema is unlikely to improve much.  Stressed importance of elevation as well as activity.  Would benefit from lymphedema clinic  -     Ambulatory referral/consult to Home Health; Future; Expected date: 04/14/2023    3. Mild dementia without behavioral disturbance, psychotic disturbance, mood disturbance, or anxiety, unspecified dementia type  -discussed with patient/family that patient does have signs of early  dementia.  She does not want to start any medication such as donepezil.  Family advised she may begin to require more assistance and monitoring  -     Ambulatory referral/consult to Home Health; Future; Expected date: 04/14/2023      Follow up if symptoms worsen or fail to improve.            4/13/2023 Ivanna Cutler NP

## 2023-04-18 PROCEDURE — G0180 MD CERTIFICATION HHA PATIENT: HCPCS | Mod: ,,, | Performed by: NURSE PRACTITIONER

## 2023-04-18 PROCEDURE — G0180 PR HOME HEALTH MD CERTIFICATION: ICD-10-PCS | Mod: ,,, | Performed by: NURSE PRACTITIONER

## 2023-04-21 ENCOUNTER — OUTSIDE PLACE OF SERVICE (OUTPATIENT)
Dept: FAMILY MEDICINE | Facility: CLINIC | Age: 88
End: 2023-04-21
Payer: MEDICARE

## 2023-04-21 DIAGNOSIS — M79.89 LEG SWELLING: ICD-10-CM

## 2023-04-21 DIAGNOSIS — I10 HTN (HYPERTENSION): ICD-10-CM

## 2023-04-21 DIAGNOSIS — B37.2 CANDIDIASIS OF SKIN: ICD-10-CM

## 2023-04-21 DIAGNOSIS — F03.90 DEMENTIA: Primary | ICD-10-CM

## 2023-04-21 PROCEDURE — 99349 PR HOME VISIT,ESTAB PATIENT,LEVEL III: ICD-10-PCS | Mod: S$GLB,,, | Performed by: FAMILY MEDICINE

## 2023-04-21 PROCEDURE — 99349 HOME/RES VST EST MOD MDM 40: CPT | Mod: S$GLB,,, | Performed by: FAMILY MEDICINE

## 2023-04-21 RX ORDER — FUROSEMIDE 40 MG/1
40 TABLET ORAL DAILY
Qty: 30 TABLET | Refills: 3
Start: 2023-04-21 | End: 2023-04-24 | Stop reason: SDUPTHER

## 2023-04-24 RX ORDER — FUROSEMIDE 40 MG/1
40 TABLET ORAL DAILY
Qty: 30 TABLET | Refills: 5 | Status: SHIPPED | OUTPATIENT
Start: 2023-04-24 | End: 2023-09-13 | Stop reason: SDUPTHER

## 2023-04-25 ENCOUNTER — TELEPHONE (OUTPATIENT)
Dept: FAMILY MEDICINE | Facility: CLINIC | Age: 88
End: 2023-04-25

## 2023-04-25 NOTE — TELEPHONE ENCOUNTER
Per Dr. Hernandes please fax letter to [facility] med pass nurse letting them know to increase furosemide to 40mg daily. Letter faxed.

## 2023-05-01 DIAGNOSIS — B37.2 CANDIDIASIS OF SKIN: ICD-10-CM

## 2023-05-01 RX ORDER — FLUCONAZOLE 100 MG/1
100 TABLET ORAL DAILY
Qty: 7 TABLET | Refills: 0 | Status: SHIPPED | OUTPATIENT
Start: 2023-05-01 | End: 2023-05-31

## 2023-05-01 RX ORDER — NYSTATIN 100000 [USP'U]/G
POWDER TOPICAL 4 TIMES DAILY
Qty: 120 G | Refills: 5 | Status: SHIPPED | OUTPATIENT
Start: 2023-05-01 | End: 2023-08-30 | Stop reason: SDUPTHER

## 2023-05-01 NOTE — TELEPHONE ENCOUNTER
----- Message from Sarah Constantino sent at 5/1/2023  8:22 AM CDT -----  Patient granddaughter Sydney called and stated that the patient still has a yeast infection under her breast and she need a refill of the  antibiotic and her  powder she is out of the medicine please call into [facility] on [address] . Please give her a call at [phone]

## 2023-05-01 NOTE — TELEPHONE ENCOUNTER
Spoke to Sydney and she stated since pt was prescribed the Diflucan and Nystatin powder the rash has gotten a lot better but has not went away completely and looks like it may be coming back    Sydney is asking for a refill on the Diflucan and Nystatin powder.   Pharmacy [facility] [address].

## 2023-05-01 NOTE — TELEPHONE ENCOUNTER
Spoke to Sydney pts granddaughter and let her know per Ivanna verbatim below. Sydney verbalized understanding

## 2023-05-01 NOTE — TELEPHONE ENCOUNTER
Please let family know refills were sent in but make sure they are still using the ketoconazole shampoo 2-3 times weekly to wash the skin.  Need to be cautious as to how much Diflucan will use due to liver concerns so recommend trying to manage with topical wash and powder as much as we can

## 2023-05-23 ENCOUNTER — EXTERNAL HOME HEALTH (OUTPATIENT)
Dept: HOME HEALTH SERVICES | Facility: HOSPITAL | Age: 88
End: 2023-05-23
Payer: MEDICARE

## 2023-05-26 ENCOUNTER — TELEPHONE (OUTPATIENT)
Dept: FAMILY MEDICINE | Facility: CLINIC | Age: 88
End: 2023-05-26

## 2023-05-26 NOTE — TELEPHONE ENCOUNTER
Spoke to pts grand daughter and let her know per Quinn verbatim below. Pts granddaughter verbalized understanding

## 2023-05-26 NOTE — TELEPHONE ENCOUNTER
Double Lasix to 40 mg twice daily over the weekend, elevate and compress the legs.  If no improvement in fluid retention we will consider a higher powered fluid pill in addition.  May need to recheck labs if we do that.  She can give us an update next week

## 2023-05-26 NOTE — TELEPHONE ENCOUNTER
"Per  fax " Pt with 3+ edema to bilateral feet. Pt is sitting in chair and stated she does elevate her feet to help decrease edema. Pt reports taking 2 fluid pills daily as compared to only one before"    Spoke to pts grand daughter and she stated pt feet are swollen but pt only takes 1 40mg furosemide daily and has been elevating feet daily   "

## 2023-06-01 ENCOUNTER — TELEPHONE (OUTPATIENT)
Dept: FAMILY MEDICINE | Facility: CLINIC | Age: 88
End: 2023-06-01

## 2023-06-01 NOTE — TELEPHONE ENCOUNTER
----- Message from Ivanna Frye sent at 6/1/2023  2:14 PM CDT -----  Contact: Sydney  Pt would like Inter dry 10 x 12 roll called in for her yeast infection under her breast. Sydney @[phone]

## 2023-06-01 NOTE — TELEPHONE ENCOUNTER
Spoke to pts grand daughter Cornelia and she stated the nurse from [facility] recommending coloplasty Interdry which is to help dry up moisture. Let Cornelia know we can not order this it is not in our system. Cornelia verbalized understanding

## 2023-06-01 NOTE — LETTER
[address]  [place] [address]  Phone: [phone]   [phone]                        MD Angelita Miranda, MD Rachel Abbasi, MD Jarrett Mayes, PASHEELA Santillan, CARIN Cutler, CARIN Villasenor, CARIN Lawrence, OSWALDO      Date: 06/01/2023        Patient: Ghazala Hagen  YOB: 1935      Per Arminda Campa to order Coloplasty Inter dry        Sincerely,     Ana Lilia Herrera LPN

## 2023-06-01 NOTE — TELEPHONE ENCOUNTER
Spoke to pts granddaughter Cornelia, and she stated pt keeps getting another yeast rash under breast from having so much moisture. Cornelia was told by [facility]  that our office can send them an order for Coloplasty Interdry which can be placed under the pts breast to help collect moisture.     Okay to send letter to [facility] to order ColoPlasty Inter dry

## 2023-06-05 ENCOUNTER — DOCUMENT SCAN (OUTPATIENT)
Dept: HOME HEALTH SERVICES | Facility: HOSPITAL | Age: 88
End: 2023-06-05
Payer: MEDICARE

## 2023-06-07 ENCOUNTER — DOCUMENT SCAN (OUTPATIENT)
Dept: HOME HEALTH SERVICES | Facility: HOSPITAL | Age: 88
End: 2023-06-07
Payer: MEDICARE

## 2023-06-08 RX ORDER — LEVOTHYROXINE SODIUM 200 UG/1
200 TABLET ORAL
Qty: 90 TABLET | Refills: 3 | Status: SHIPPED | OUTPATIENT
Start: 2023-06-08

## 2023-06-08 NOTE — TELEPHONE ENCOUNTER
Please let daughter know you euthyrox is just a different brand name of levothyroxine.  And I am fine with prescribing her medications.

## 2023-06-08 NOTE — TELEPHONE ENCOUNTER
Spoke with Sydney, pt granddaughter in regards to recent message sent. Verbalized per Ivanna that the euthyrox is just a different brand name of levothyroxine. Ivanna is also fine with prescribing her medications. Sydney acknowledged understanding. Sydney would like for to prescribe the levothyroxine. She states because the pt's has dementia, she has a hard time, when the medication pill changes color or size.

## 2023-06-08 NOTE — TELEPHONE ENCOUNTER
----- Message from Sarah Constantino sent at 6/8/2023  9:03 AM CDT -----  Patient granddaughter Sydney called and stated that she picked up a prescription for the patient and she is not sure if its one she normally take.  She stated that she picked up euthyrox but she normally takes levothyroxine and she does not know if its the same or what the difference is  also she would like to have Ivanna take over prescribing all her medications. please give her a call at [phone]

## 2023-06-11 ENCOUNTER — DOCUMENT SCAN (OUTPATIENT)
Dept: HOME HEALTH SERVICES | Facility: HOSPITAL | Age: 88
End: 2023-06-11
Payer: MEDICARE

## 2023-06-27 ENCOUNTER — OUTSIDE PLACE OF SERVICE (OUTPATIENT)
Dept: FAMILY MEDICINE | Facility: CLINIC | Age: 88
End: 2023-06-27
Payer: MEDICARE

## 2023-06-27 DIAGNOSIS — E03.9 ACQUIRED HYPOTHYROIDISM: ICD-10-CM

## 2023-06-27 DIAGNOSIS — F33.0 MILD EPISODE OF RECURRENT MAJOR DEPRESSIVE DISORDER: Primary | ICD-10-CM

## 2023-06-27 DIAGNOSIS — Z91.81 AT RISK FOR FALLS: ICD-10-CM

## 2023-06-27 DIAGNOSIS — Z79.899 ENCOUNTER FOR LONG-TERM (CURRENT) USE OF OTHER MEDICATIONS: Primary | ICD-10-CM

## 2023-06-27 DIAGNOSIS — F03.90 DEMENTIA: ICD-10-CM

## 2023-06-27 PROCEDURE — 99349 PR HOME VISIT,ESTAB PATIENT,LEVEL III: ICD-10-PCS | Mod: S$GLB,,, | Performed by: PHYSICIAN ASSISTANT

## 2023-06-27 PROCEDURE — 99349 HOME/RES VST EST MOD MDM 40: CPT | Mod: S$GLB,,, | Performed by: PHYSICIAN ASSISTANT

## 2023-07-13 RX ORDER — TRAZODONE HYDROCHLORIDE 50 MG/1
50 TABLET ORAL NIGHTLY
Qty: 30 TABLET | Refills: 3 | Status: SHIPPED | OUTPATIENT
Start: 2023-07-13

## 2023-07-13 NOTE — TELEPHONE ENCOUNTER
----- Message from Raquel Luz sent at 7/13/2023  8:04 AM CDT -----  Pt daughter yonas is calling asking if she can get some xanax to help her sleep.   [facility] [address]   [phone]

## 2023-07-13 NOTE — TELEPHONE ENCOUNTER
This would be considered very high risk medication in this age range.  I would be open to something more like trazodone low-dose 50 mg nightly if she is open to this we can try instead of alprazolam.  If she wants to wait for Ivanna's recommendation she can do that when she returns next week

## 2023-07-13 NOTE — TELEPHONE ENCOUNTER
Spoke with pt's daughter Sydney in regards to message sent. Sydney states that the pt has c/o not sleeping well, that she will wake up early morning hours 2:00/3:00 o'clock and not being able to go back to sleep x 2-3 weeks. Sydney states that the pt has tried xanax in the past and has help with sleeping issues. Pt is requesting a prescription for xanax.

## 2023-07-13 NOTE — TELEPHONE ENCOUNTER
Spoke with Sydney gloria's daughter in regards to recent message sent. Verbalized per Quinn that xanax would be considered very high risk medication in this age range. Quinn would be open to something more like trazodone low-dose 50 mg nightly if she is open to this we can try instead of alprazolam.  If she wants to wait for Ivanna's recommendation she can do that when she returns next week. Sydney states that they would like to try that trazodone.

## 2023-07-20 ENCOUNTER — TELEPHONE (OUTPATIENT)
Dept: FAMILY MEDICINE | Facility: CLINIC | Age: 88
End: 2023-07-20

## 2023-07-20 NOTE — TELEPHONE ENCOUNTER
Spoke with Sydney, pt's granddaughter in regards to messages sent. Verbalized per Quinn that they can be taken at the same time although trazodone generally dose qhs for sleep. Lexapro can be taken qam or pm just be consistent with the time of day. Sydney acknowledged understanding.

## 2023-07-20 NOTE — TELEPHONE ENCOUNTER
They can be taken at the same time although trazodone generally dose qhs for sleep. Lexapro can be taken qam or pm just be consistent with the time of day

## 2023-07-20 NOTE — TELEPHONE ENCOUNTER
----- Message from Ivanna Frye sent at 7/20/2023  8:25 AM CDT -----  Contact: Sydney Grimes wants to know if the patient can take Trazadone and Lexapro at the same time? Please advise. Sydney @[phone]

## 2023-08-30 ENCOUNTER — TELEPHONE (OUTPATIENT)
Dept: FAMILY MEDICINE | Facility: CLINIC | Age: 88
End: 2023-08-30

## 2023-08-30 DIAGNOSIS — B37.2 CANDIDIASIS OF SKIN: ICD-10-CM

## 2023-08-30 RX ORDER — NYSTATIN 100000 [USP'U]/G
POWDER TOPICAL 4 TIMES DAILY
Qty: 120 G | Refills: 5 | Status: SHIPPED | OUTPATIENT
Start: 2023-08-30

## 2023-08-30 NOTE — TELEPHONE ENCOUNTER
Spoke with pt's granddaughter in regards to message sent. Chantelle states that the pt call her today c/o a yeas infection under her breast. C/o irration and itching under the breast. She stated that Ivanna has given her nystatin powder in the past that has helped. She would like to see if we would call in a refill.

## 2023-08-30 NOTE — TELEPHONE ENCOUNTER
----- Message from Sarah Constantino sent at 8/30/2023 11:11 AM CDT -----  Patient granddaughter Chantelle called and stated that the patient has a yeast infection under her belly and she would like to see if the doctor can call in her powder at [facility]. If any questions please give her a call at [phone]

## 2023-08-30 NOTE — TELEPHONE ENCOUNTER
----- Message from Sarah Constantino sent at 8/30/2023 12:16 PM CDT -----  FYI: patient granddaughter called and stated that she missed a call from the nurse, called Ivy and and she advised to put the call through. No call back is needed for this message.

## 2023-08-31 ENCOUNTER — TELEPHONE (OUTPATIENT)
Dept: FAMILY MEDICINE | Facility: CLINIC | Age: 88
End: 2023-08-31

## 2023-08-31 NOTE — TELEPHONE ENCOUNTER
Spoke with Sydney. Pt sound congested on the phone today. States the assisted living is going to covid test her. Sydney unable to answer any triage questions. States she is on her way to see pt and will call back with results of Covid test.

## 2023-08-31 NOTE — TELEPHONE ENCOUNTER
----- Message from Sujatha Cabrera sent at 8/31/2023  8:10 AM CDT -----  Contact: Granddaughter  Pt has cough and congestion and needs to be seen please advise  [phone] - Sydney

## 2023-08-31 NOTE — TELEPHONE ENCOUNTER
----- Message from Sujatha Cabrera sent at 8/31/2023 10:39 AM CDT -----  Contact: Granddaughter  Pts granddaughter informing Ivanna Arndtаннаchristine that the pt tested positive for Covid.   [phone] - Conrelia

## 2023-08-31 NOTE — TELEPHONE ENCOUNTER
Covid risk score 4   Spoke with Sydney, pt c/o congestion, sore throat and cough. Denies fever. [facility] [address]. Daughter requesting antiviral.

## 2023-09-07 ENCOUNTER — TELEPHONE (OUTPATIENT)
Dept: FAMILY MEDICINE | Facility: CLINIC | Age: 88
End: 2023-09-07

## 2023-09-07 DIAGNOSIS — F33.0 MILD EPISODE OF RECURRENT MAJOR DEPRESSIVE DISORDER: Primary | ICD-10-CM

## 2023-09-07 RX ORDER — ESCITALOPRAM OXALATE 20 MG/1
20 TABLET ORAL DAILY
Qty: 90 TABLET | Refills: 3 | Status: SHIPPED | OUTPATIENT
Start: 2023-09-07

## 2023-09-07 NOTE — TELEPHONE ENCOUNTER
----- Message from Sarah Constantino sent at 9/7/2023  9:47 AM CDT -----  Patient granddaughter Sydney called and stated that she called the pharmacy to see if the provider can speed it up to get the medication refilled, she stated [facility] was the [facility]  on [address] (she stated that she called this morning for the refill and I advise that all medication is called in at the end of the day ) if any questions please give her a call at   [phone]

## 2023-09-07 NOTE — TELEPHONE ENCOUNTER
Pt daughter called. States pt needs a refill of lexapro. Old PCP used to fill this. Pt had refills and has finally ran out. Pt is wanting Ivanna to start prescribing lexapro 20mg QD sent to [facility] on [address]. Ok to fill?

## 2023-09-11 ENCOUNTER — TELEPHONE (OUTPATIENT)
Dept: FAMILY MEDICINE | Facility: CLINIC | Age: 88
End: 2023-09-11

## 2023-09-11 RX ORDER — BENZONATATE 100 MG/1
100 CAPSULE ORAL 3 TIMES DAILY PRN
Qty: 30 CAPSULE | Refills: 0 | Status: SHIPPED | OUTPATIENT
Start: 2023-09-11 | End: 2023-09-21

## 2023-09-11 NOTE — TELEPHONE ENCOUNTER
Please let family know Marizol Boyce sent in for cough.  If symptoms are not improving would recommend chest x-ray

## 2023-09-11 NOTE — TELEPHONE ENCOUNTER
Spoke to pt granddaughter  let her know per Ivanna verbatim below. Pts david verbalized understanding

## 2023-09-11 NOTE — TELEPHONE ENCOUNTER
----- Message from Raquel Luz sent at 9/11/2023  8:19 AM CDT -----  Pt granddaughter yonas is calling to see if we can call in some cough medication. She had covid a couple of weeks ago and can not kick the cough.   [facility] [address]   [phone]

## 2023-09-11 NOTE — TELEPHONE ENCOUNTER
Sydney states Pt had covid a couple weeks ago. Pt symptoms have all resolved, but a constant productive cough. Clear mucus. Denies fever or other symptoms. Requesting something for a cough.

## 2023-09-12 ENCOUNTER — TELEPHONE (OUTPATIENT)
Dept: FAMILY MEDICINE | Facility: CLINIC | Age: 88
End: 2023-09-12

## 2023-09-12 NOTE — TELEPHONE ENCOUNTER
----- Message from Ivanna Frye sent at 9/12/2023  3:20 PM CDT -----  Contact: Sydney  Pt has an infection on the front of her leg under the left knee. Pt needs to be seen tomorrow. Sydney @[phone]

## 2023-09-12 NOTE — TELEPHONE ENCOUNTER
Spoke to pts david and she stated pts legs are swollen and the skin is starting to break and it has some drainage. Leti hernandez is wanting pt to be seen so it can be looked at, Pt scheduled tomorrow at 2:40pm

## 2023-09-13 ENCOUNTER — OFFICE VISIT (OUTPATIENT)
Dept: FAMILY MEDICINE | Facility: CLINIC | Age: 88
End: 2023-09-13
Payer: MEDICARE

## 2023-09-13 VITALS — HEART RATE: 74 BPM | OXYGEN SATURATION: 95 % | SYSTOLIC BLOOD PRESSURE: 124 MMHG | DIASTOLIC BLOOD PRESSURE: 70 MMHG

## 2023-09-13 DIAGNOSIS — E03.9 ACQUIRED HYPOTHYROIDISM: ICD-10-CM

## 2023-09-13 DIAGNOSIS — I10 PRIMARY HYPERTENSION: ICD-10-CM

## 2023-09-13 DIAGNOSIS — G30.1 MILD LATE ONSET ALZHEIMER'S DEMENTIA WITHOUT BEHAVIORAL DISTURBANCE, PSYCHOTIC DISTURBANCE, MOOD DISTURBANCE, OR ANXIETY: ICD-10-CM

## 2023-09-13 DIAGNOSIS — E66.01 MORBID OBESITY DUE TO EXCESS CALORIES: ICD-10-CM

## 2023-09-13 DIAGNOSIS — I87.2 CHRONIC VENOUS STASIS DERMATITIS OF BOTH LOWER EXTREMITIES: Primary | ICD-10-CM

## 2023-09-13 DIAGNOSIS — F02.A0 MILD LATE ONSET ALZHEIMER'S DEMENTIA WITHOUT BEHAVIORAL DISTURBANCE, PSYCHOTIC DISTURBANCE, MOOD DISTURBANCE, OR ANXIETY: ICD-10-CM

## 2023-09-13 DIAGNOSIS — F33.0 MILD EPISODE OF RECURRENT MAJOR DEPRESSIVE DISORDER: ICD-10-CM

## 2023-09-13 PROCEDURE — 99214 PR OFFICE/OUTPT VISIT, EST, LEVL IV, 30-39 MIN: ICD-10-PCS | Mod: S$GLB,,, | Performed by: NURSE PRACTITIONER

## 2023-09-13 PROCEDURE — 99214 OFFICE O/P EST MOD 30 MIN: CPT | Mod: S$GLB,,, | Performed by: NURSE PRACTITIONER

## 2023-09-13 RX ORDER — FUROSEMIDE 20 MG/1
20 TABLET ORAL DAILY
Qty: 90 TABLET | Refills: 1 | Status: SHIPPED | OUTPATIENT
Start: 2023-09-13 | End: 2024-09-12

## 2023-09-13 RX ORDER — METOPROLOL TARTRATE 50 MG/1
50 TABLET ORAL 2 TIMES DAILY
Qty: 180 TABLET | Refills: 3 | Status: SHIPPED | OUTPATIENT
Start: 2023-09-13

## 2023-09-13 NOTE — PROGRESS NOTES
SUBJECTIVE:    Patient ID: Ghazala Hagen is a 88 y.o. female.    Chief Complaint: Leg Swelling (No bottles//pt is here for bilateral swelling and leg left wound x 2-3 days ago//denies pain or discomfort around the wound//decline pna vaccine//Pt was positive for covid about 2 weeks ago//decline weight and height check//SCOTT )    Patient here for sick visit.  Pt here with her granddaughter who reports the past several days she's noticed weeping form left lower leg and yesterday seemed to have small amt of pus draining. Denies any fever/chills. Pt with hx of chronic LE swelling- spends most of her day and night sitting in her recliner chair with her feet hanging down-she rarely leaves her room at the assisted living.  Recently taking lasix 20mg daily which doesn't seem to make much a difference.    Pt reports overall has been doing well. family reports chronic yeast rash seems to be managed with topical cream and wash they are using.    Pt does have [facility] coming in              No visits with results within 6 Month(s) from this visit.   Latest known visit with results is:   Lab Visit on 12/19/2022   Component Date Value Ref Range Status    Microalbumin, Urine 12/19/2022 <6.0  ug/mL Final    Creatinine, Urine 12/19/2022 104.2  15.0 - 325.0 mg/dL Final    Microalb/Creat Ratio 12/19/2022 Unable to calculate  0.0 - 30.0 ug/mg Final    Specimen UA 12/19/2022 Urine, Clean Catch   Final    Color, UA 12/19/2022 Yellow  Yellow, Straw, Bridget Final    Appearance, UA 12/19/2022 Clear  Clear Final    pH, UA 12/19/2022 8.5 (A)  5.0 - 8.0 Final    Specific Gravity, UA 12/19/2022 1.015  1.005 - 1.030 Final    Protein, UA 12/19/2022 Negative  Negative Final    Glucose, UA 12/19/2022 Negative  Negative Final    Ketones, UA 12/19/2022 Negative  Negative Final    Bilirubin (UA) 12/19/2022 Negative  Negative Final    Occult Blood UA 12/19/2022 Negative  Negative Final    Nitrite, UA 12/19/2022 Negative  Negative Final    Urobilinogen,  UA 12/19/2022 1.0  <2.0 EU/dL Final    Leukocytes, UA 12/19/2022 Negative  Negative Final       Past Medical History:   Diagnosis Date    Hypertension     Thyroid disease      Past Surgical History:   Procedure Laterality Date    CHOLECYSTECTOMY      HIP SURGERY Left     s/p CHRIS complicated by joint infection/spacer    knee replacement Right     KNEE SURGERY Left     TKR    SHOULDER SURGERY Right     TONSILLECTOMY       Family History   Problem Relation Age of Onset    COPD Mother     Cancer Father        All of your core healthy metrics are met.      The CVD Risk score (Marcela, et al., 2008) failed to calculate for the following reasons:    The 2008 CVD risk score is only valid for ages 30 to 74     Marital Status:   Alcohol History:  reports that she does not currently use alcohol.  Tobacco History:  reports that she has never smoked. She has never been exposed to tobacco smoke. She has never used smokeless tobacco.  Drug History:  reports no history of drug use.    Health Maintenance Topics with due status: Not Due       Topic Last Completion Date    Lipid Panel 12/16/2022       There is no immunization history on file for this patient.    Review of patient's allergies indicates:  No Known Allergies    Current Outpatient Medications:     EScitalopram oxalate (LEXAPRO) 20 MG tablet, Take 1 tablet (20 mg total) by mouth once daily., Disp: 90 tablet, Rfl: 3    levothyroxine (SYNTHROID) 200 MCG tablet, Take 1 tablet (200 mcg total) by mouth before breakfast., Disp: 90 tablet, Rfl: 3    benzonatate (TESSALON) 100 MG capsule, Take 1 capsule (100 mg total) by mouth 3 (three) times daily as needed for Cough., Disp: 30 capsule, Rfl: 0    calcium carbonate (OS-LYNNETTE) 600 mg calcium (1,500 mg) Tab, Take 600 mg by mouth once daily., Disp: , Rfl:     furosemide (LASIX) 20 MG tablet, Take 1 tablet (20 mg total) by mouth once daily., Disp: 90 tablet, Rfl: 1    ketoconazole (NIZORAL) 2 % shampoo, Lather shampoo and  clean skin folds daily for 1 week then 2-3 times a week, Disp: 120 mL, Rfl: 2    KRILL OIL ORAL, Take by mouth., Disp: , Rfl:     meclizine (ANTIVERT) 25 mg tablet, Take 1 tablet (25 mg total) by mouth 3 (three) times daily as needed for Dizziness., Disp: 60 tablet, Rfl: 2    metoprolol tartrate (LOPRESSOR) 50 MG tablet, Take 1 tablet (50 mg total) by mouth 2 (two) times daily., Disp: 180 tablet, Rfl: 3    multivitamin (ONE DAILY MULTIVITAMIN) per tablet, Take 1 tablet by mouth once daily., Disp: , Rfl:     nystatin (MYCOSTATIN) powder, Apply topically 4 (four) times daily., Disp: 120 g, Rfl: 5    potassium chloride (K-TAB) 20 mEq, Take 15 mEq by mouth once daily., Disp: , Rfl:     traZODone (DESYREL) 50 MG tablet, Take 1 tablet (50 mg total) by mouth every evening., Disp: 30 tablet, Rfl: 3    Review of Systems   Constitutional:  Negative for appetite change, chills and fever.   Eyes:  Negative for visual disturbance.   Respiratory:  Negative for cough, shortness of breath and wheezing.    Cardiovascular:  Positive for leg swelling (with weeping fluid from left lower calf). Negative for chest pain and palpitations.   Gastrointestinal:  Negative for abdominal pain, constipation and diarrhea.   Genitourinary:  Negative for dysuria, frequency, hematuria and urgency.        Wears diaper for urinary incontinence   Musculoskeletal:  Negative for back pain and gait problem.   Skin:  Negative for rash.   Neurological:  Negative for dizziness, syncope, speech difficulty, numbness and headaches.   Psychiatric/Behavioral:  Positive for confusion. Negative for agitation, behavioral problems, dysphoric mood (stable on med) and sleep disturbance. The patient is not nervous/anxious.           Objective:      Vitals:    09/13/23 1458   BP: 124/70   Pulse: 74   SpO2: 95%     Physical Exam  Vitals reviewed.   Constitutional:       General: She is not in acute distress.     Appearance: She is well-developed.      Comments: Morbidly  obese elderly WF sitting in WC   HENT:      Head: Normocephalic and atraumatic.   Neck:      Vascular: No carotid bruit.   Cardiovascular:      Rate and Rhythm: Normal rate and regular rhythm.      Heart sounds: No murmur heard.  Pulmonary:      Effort: Pulmonary effort is normal. No respiratory distress.      Breath sounds: Normal breath sounds. No wheezing or rales.   Abdominal:      General: There is no distension.      Palpations: Abdomen is soft.      Tenderness: There is no abdominal tenderness.   Musculoskeletal:      Cervical back: Neck supple.      Right lower leg: Edema (1-2+ pitting edema right lower leg, no ulcers/wounds/drainage) present.      Left lower leg: Edema (2+ pitting edema from mid calf to foot with chronic thickeneded skin changes and scaly skin to lower calf, scant amt of serous fluid weeping, no ulcers or purulent fluid, faint erythema, no warmth) present.   Lymphadenopathy:      Cervical: No cervical adenopathy.   Skin:     General: Skin is warm and dry.      Findings: Rash (mild erythema under left lower abd skin folds. no rash under breasts) present.          Neurological:      General: No focal deficit present.      Mental Status: She is alert.      Gait: Gait normal.      Comments: Pleasant, conversant   Psychiatric:         Mood and Affect: Mood normal.         Behavior: Behavior normal.         Thought Content: Thought content is not paranoid or delusional.         Cognition and Memory: Cognition is impaired.         Left leg    Right leg    Assessment:       1. Chronic venous stasis dermatitis of both lower extremities    2. Acquired hypothyroidism    3. Primary hypertension    4. Mild late onset Alzheimer's dementia without behavioral disturbance, psychotic disturbance, mood disturbance, or anxiety    5. Morbid obesity due to excess calories    6. Mild episode of recurrent major depressive disorder           Plan:       1. Chronic venous stasis dermatitis of both lower  extremities  -no obvious infection, will send orders to  for unna boot wraps 3x/week and discussed with pt/granddaughter she needs to be wearing compression at all times given her immobility. Discussed with family option of referral to lymphedema clinic once HH is completed as she really needs compression wraps she or caretakers can apply once released from   -     Ambulatory referral/consult to Home Health; Future; Expected date: 09/14/2023  -     furosemide (LASIX) 20 MG tablet; Take 1 tablet (20 mg total) by mouth once daily.  Dispense: 90 tablet; Refill: 1    2. Acquired hypothyroidism   -will have  draw labs    3. Primary hypertension  -BP well controlled  -     metoprolol tartrate (LOPRESSOR) 50 MG tablet; Take 1 tablet (50 mg total) by mouth 2 (two) times daily.  Dispense: 180 tablet; Refill: 3    4. Mild late onset Alzheimer's dementia without behavioral disturbance, psychotic disturbance, mood disturbance, or anxiety   -stable, no issues reported. Donepezil previously discontinued as pt reports wasn't tolerating    5. Morbid obesity due to excess calories    6. Mild episode of recurrent major depressive disorder   -stable on lexapro    Follow up if symptoms worsen or fail to improve, for rounds at [facility].          Counseled on age and gender appropriate medical preventative services, including cancer screenings, immunizations, overall nutritional health, need for a consistent exercise regimen and an overall push towards maintaining a vigorous and active lifestyle.      9/13/2023 Ivanna Cutler NP

## 2023-09-14 ENCOUNTER — TELEPHONE (OUTPATIENT)
Dept: FAMILY MEDICINE | Facility: CLINIC | Age: 88
End: 2023-09-14

## 2023-09-14 NOTE — TELEPHONE ENCOUNTER
"Spoke with Anahi, states Ivanna sent an order to put katty boots on both legs but she needs to "come over" the katty boot with something, they usually use coban. They just needed a verbal okay per Ivanna. Gave her the verbal per Ivanna.   "

## 2023-09-14 NOTE — TELEPHONE ENCOUNTER
----- Message from Raquel Luz sent at 9/14/2023  9:38 AM CDT -----  - 8:45-cha macario with [facility] is calling and she is seeing the patient tomorrow. They have an order to wrap both legs with an katty boot. They need an order to wrap with something else   [phone]

## 2023-09-15 PROCEDURE — G0180 MD CERTIFICATION HHA PATIENT: HCPCS | Mod: ,,, | Performed by: NURSE PRACTITIONER

## 2023-09-15 PROCEDURE — G0180 PR HOME HEALTH MD CERTIFICATION: ICD-10-PCS | Mod: ,,, | Performed by: NURSE PRACTITIONER

## 2023-09-19 ENCOUNTER — TELEPHONE (OUTPATIENT)
Dept: FAMILY MEDICINE | Facility: CLINIC | Age: 88
End: 2023-09-19

## 2023-09-19 DIAGNOSIS — I89.0 LYMPHEDEMA: Primary | ICD-10-CM

## 2023-09-19 DIAGNOSIS — I87.2 CHRONIC VENOUS STASIS DERMATITIS OF BOTH LOWER EXTREMITIES: ICD-10-CM

## 2023-09-19 RX ORDER — MUPIROCIN 20 MG/G
OINTMENT TOPICAL 2 TIMES DAILY
Qty: 30 G | Refills: 2 | Status: SHIPPED | OUTPATIENT
Start: 2023-09-19 | End: 2023-10-10 | Stop reason: SDUPTHER

## 2023-09-19 NOTE — TELEPHONE ENCOUNTER
----- Message from Sujatha Cabrera sent at 9/19/2023 10:40 AM CDT -----  Pt needs refill on [facility]  [phone]

## 2023-09-19 NOTE — TELEPHONE ENCOUNTER
Spoke to pts granddaughter and let her know per Ivanna verbatim below. Pts granddaughter verbalized understanding and Agrees to Lymphedema clinic     Letter with ordered faxed to home health

## 2023-09-19 NOTE — TELEPHONE ENCOUNTER
----- Message from Raquel Sukh sent at 9/19/2023  9:19 AM CDT -----  - 8:43-cha with home health is calling and the patient is refusing katty boots as it is uncomfortable and prohibits her to take a shower. She refused them. She needs a change in care of the left leg. They are closed not draining, scaly, no redness or warmth. Redness in color   She had rales when listing to her in the upper right anterior and posterior lobe. She also has no been able to draw her labs as she keeps putting it off   [phone]

## 2023-09-19 NOTE — TELEPHONE ENCOUNTER
Please call patient's granddaughter and let them know sound like pt is refusing the Unna boot wraps and she really needs some type of compression. The other option is referring her to the lymphedema clinic if they agree.  Patient is also due for labs but per home how she is been refusing blood draws    If family agrees to lymphedema clinic referral we can let HH know that has been ordered. For now just clean leg and apply mupirocin ointment

## 2023-09-19 NOTE — LETTER
[address]  [place] [address]  Phone: [phone]   [phone]                        MD Angelita Miranda, MD Rachel Abbasi, MD Jarrett Mayes, PASHEELA Santillan, CARIN Cutler, CARIN Villasenor, CARIN Lawrence PA-C      Date: 09/19/2023        Patient: Ghazala Hagen  YOB: 1935      Per Ivanna flores.  Clean legs and apply mupirocin ointment.         Sincerely,     Ana Lilia Herrera LPN

## 2023-09-20 ENCOUNTER — TELEPHONE (OUTPATIENT)
Dept: FAMILY MEDICINE | Facility: CLINIC | Age: 88
End: 2023-09-20

## 2023-09-20 DIAGNOSIS — I87.2 CHRONIC VENOUS STASIS DERMATITIS OF BOTH LOWER EXTREMITIES: ICD-10-CM

## 2023-09-20 DIAGNOSIS — I89.0 LYMPHEDEMA: ICD-10-CM

## 2023-09-20 NOTE — TELEPHONE ENCOUNTER
----- Message from Raquel Luz sent at 9/20/2023  9:53 AM CDT -----  - 8:32-cha with home health is calling back from yesterday. She needs a different order for wound care as patient has refused katty boots. She is going back out today and needs order  [phone]

## 2023-09-20 NOTE — TELEPHONE ENCOUNTER
Spoke to Anahi with [facility], and let her know Ian is wanting to do Cleaning legs and applying mupirocin daily, even with Home Health only coming a few time a week it needs to be done. Anahi verbalized understanding and is going to let the nurse know so it can be done when home health is not there.      Anahi stated pt needs a prescription for mupirocin let pt know ian sent it in yesterday

## 2023-09-21 ENCOUNTER — DOCUMENT SCAN (OUTPATIENT)
Dept: HOME HEALTH SERVICES | Facility: HOSPITAL | Age: 88
End: 2023-09-21
Payer: MEDICARE

## 2023-10-02 ENCOUNTER — DOCUMENT SCAN (OUTPATIENT)
Dept: HOME HEALTH SERVICES | Facility: HOSPITAL | Age: 88
End: 2023-10-02
Payer: MEDICARE

## 2023-10-03 ENCOUNTER — EXTERNAL HOME HEALTH (OUTPATIENT)
Dept: HOME HEALTH SERVICES | Facility: HOSPITAL | Age: 88
End: 2023-10-03
Payer: MEDICARE

## 2023-10-05 ENCOUNTER — TELEPHONE (OUTPATIENT)
Dept: FAMILY MEDICINE | Facility: CLINIC | Age: 88
End: 2023-10-05

## 2023-10-05 NOTE — TELEPHONE ENCOUNTER
I do not think I would really add anything other than Bactroban.  If they feel really inflamed she might try over-the-counter hydrocortisone mixed with a topical emollient.  If she has concern for cellulitis or otherwise deeper infection please let us know and would consider oral antibiotics at that time

## 2023-10-05 NOTE — TELEPHONE ENCOUNTER
Spoke to Anahi and she stated Pt has several small blisters on both of her legs that are popped and has yellow discharge coming out and Pt has been sitting with her legs down and when she baths she lets hot water spray directed on the blisters and it is causing them to be come red and raised. Anahi stated she educated pt on sitting with Legs up and not letting the hot water spray directly on the blisters. Anahi is asking for something to put on the blisters besides Bactroban

## 2023-10-05 NOTE — TELEPHONE ENCOUNTER
----- Message from Raquel Luz sent at 10/5/2023 11:28 AM CDT -----  - 10:47-cha with [facility] is calling and both of her legs have several small blisters that are draining a yellow discharge. Red, warm ,raised. She is using Bactroban on it as she has refused katty boots   [phone]

## 2023-10-10 ENCOUNTER — TELEPHONE (OUTPATIENT)
Dept: FAMILY MEDICINE | Facility: CLINIC | Age: 88
End: 2023-10-10

## 2023-10-10 DIAGNOSIS — I89.0 LYMPHEDEMA: ICD-10-CM

## 2023-10-10 DIAGNOSIS — I87.2 CHRONIC VENOUS STASIS DERMATITIS OF BOTH LOWER EXTREMITIES: ICD-10-CM

## 2023-10-10 RX ORDER — MUPIROCIN 20 MG/G
OINTMENT TOPICAL 2 TIMES DAILY
Qty: 60 G | Refills: 5 | Status: SHIPPED | OUTPATIENT
Start: 2023-10-10

## 2023-10-10 RX ORDER — POTASSIUM CHLORIDE 1500 MG/1
20 TABLET, EXTENDED RELEASE ORAL DAILY
Qty: 90 TABLET | Refills: 1 | Status: SHIPPED | OUTPATIENT
Start: 2023-10-10

## 2023-10-10 NOTE — TELEPHONE ENCOUNTER
----- Message from Raquel Luz sent at 10/10/2023  9:18 AM CDT -----  Pt grand daughter is calling for refill on bactroban and potassium   Shahzad Suarez [phone]

## 2023-10-10 NOTE — TELEPHONE ENCOUNTER
Spoke with Sydney, pt's granddaughter in regards to message sent. Sydney stated that the pt is taken potassium 20 MEQ tablets, once daily.

## 2023-10-18 ENCOUNTER — TELEPHONE (OUTPATIENT)
Dept: FAMILY MEDICINE | Facility: CLINIC | Age: 88
End: 2023-10-18

## 2023-10-18 RX ORDER — DOXYCYCLINE 100 MG/1
100 CAPSULE ORAL EVERY 12 HOURS
Qty: 20 CAPSULE | Refills: 0 | Status: SHIPPED | OUTPATIENT
Start: 2023-10-18 | End: 2023-10-28

## 2023-10-18 NOTE — TELEPHONE ENCOUNTER
Spoke with Anahi in regards to message sent. Verbalized that Ivanna is going to send in an antibiotic for the pt, also she is okay with increasing home health visit to 3 time a week. Ivanna would also strongly encourage the pt to wear compression wraps otherwise we're not going to be able to manage her leg edema. Anahi acknowledged understanding.     Anahi wanted to let you know that the pt has refused to wear the unna-boots, since they were ordered.     Ordered for increased visit has been sent to [facility].

## 2023-10-18 NOTE — TELEPHONE ENCOUNTER
Please let home health and patient/family know I will send in antibiotics and okay to increase visits to 3 days a week.  I would once again strongly encourage pt to wear compression wraps otherwise we're not going to be able to manage her leg edema.

## 2023-10-18 NOTE — TELEPHONE ENCOUNTER
----- Message from Ivette Fernandes MA sent at 10/18/2023 11:18 AM CDT -----    ----- Message -----  From: Raquel Luz  Sent: 10/18/2023  11:10 AM CDT  To: Joseph Hernandes Staff    - 9:22-cha with [facility] is calling and she had a fall yesterday. Fell down to her knees. They called the fire department to help her get up. She wrapped it and when unwrapped today the wrap was saturated in a green smelly gunk. Please call   [phone]

## 2023-10-18 NOTE — TELEPHONE ENCOUNTER
HH nurse Anahi,  states she sees pt twice a week.she already has issues with dermatitis. Over the weekend her legs blistered and were weeping. Draining is light green with odor. She seen pt on Saturday  applied abd pad and conform. Anahi thinks she needs antibiotics and would like to increase the amount of visits to three days a week.   Also states pt refuses to wear compression socks or elevate her legs throughout the day.     Pt fell to her knees yesterday no injury.

## 2023-10-18 NOTE — TELEPHONE ENCOUNTER
Spoke with Sydney, verbalized that Ivanna has sent in an antibiotic and is increasing the home health visit to 3 time a week to monitor her leg edema. Ivanna also would like for you to strongly encourage the pt to wear compression wraps otherwise we're not going to be able to manage her leg edema. Sydney acknowledged understanding.

## 2023-10-20 ENCOUNTER — DOCUMENT SCAN (OUTPATIENT)
Dept: HOME HEALTH SERVICES | Facility: HOSPITAL | Age: 88
End: 2023-10-20
Payer: MEDICARE

## 2023-11-07 ENCOUNTER — DOCUMENT SCAN (OUTPATIENT)
Dept: HOME HEALTH SERVICES | Facility: HOSPITAL | Age: 88
End: 2023-11-07
Payer: MEDICARE

## 2023-11-07 PROCEDURE — G0180 PR HOME HEALTH MD CERTIFICATION: ICD-10-PCS | Mod: ,,, | Performed by: NURSE PRACTITIONER

## 2023-11-07 PROCEDURE — G0180 MD CERTIFICATION HHA PATIENT: HCPCS | Mod: ,,, | Performed by: NURSE PRACTITIONER

## 2023-11-13 ENCOUNTER — OUTSIDE PLACE OF SERVICE (OUTPATIENT)
Dept: FAMILY MEDICINE | Facility: CLINIC | Age: 88
End: 2023-11-13
Payer: MEDICARE

## 2023-11-13 DIAGNOSIS — I87.2 CHRONIC VENOUS STASIS DERMATITIS OF BOTH LOWER EXTREMITIES: Primary | ICD-10-CM

## 2023-11-13 DIAGNOSIS — Z74.1 NEED FOR ASSISTANCE WITH PERSONAL CARE: ICD-10-CM

## 2023-11-13 DIAGNOSIS — F03.90 DEMENTIA: ICD-10-CM

## 2023-11-13 DIAGNOSIS — I10 PRIMARY HYPERTENSION: ICD-10-CM

## 2023-11-13 DIAGNOSIS — I87.303 STASIS EDEMA OF BOTH LOWER EXTREMITIES: ICD-10-CM

## 2023-11-13 PROCEDURE — 99349 HOME/RES VST EST MOD MDM 40: CPT | Mod: S$GLB,,, | Performed by: FAMILY MEDICINE

## 2023-11-13 PROCEDURE — 99349 PR HOME VISIT,ESTAB PATIENT,LEVEL III: ICD-10-PCS | Mod: S$GLB,,, | Performed by: FAMILY MEDICINE

## 2023-11-14 ENCOUNTER — TELEPHONE (OUTPATIENT)
Dept: FAMILY MEDICINE | Facility: CLINIC | Age: 88
End: 2023-11-14

## 2023-11-14 NOTE — TELEPHONE ENCOUNTER
Per Dr. Hernandes   Mupirocin   Non stick dressing  Carmella carrera    Spoke with Anahi. She voiced understanding.

## 2023-11-14 NOTE — TELEPHONE ENCOUNTER
Spoke with Anahi. She seen pt this afternoon. Pt removed the Gentian violet after seeing Dr. Hernandes last night. States she now has areas [id number] and [id number] on the inside of her legs that is missing a layer of skin. States it is rad and leaking clear fluid. States pt is not complaining of any pain. She just noticed it when she visited pt today.

## 2023-11-14 NOTE — TELEPHONE ENCOUNTER
----- Message from Raquel Luz sent at 11/14/2023  3:06 PM CST -----  - 2:32-cha with home health is calling and she has peeled off the sharif voilet that was in between her legs and now the skin is open. Its moist and draining fluid. Would like to know what to do   [phone]

## 2023-11-16 ENCOUNTER — DOCUMENT SCAN (OUTPATIENT)
Dept: HOME HEALTH SERVICES | Facility: HOSPITAL | Age: 88
End: 2023-11-16
Payer: MEDICARE

## 2023-12-05 ENCOUNTER — DOCUMENT SCAN (OUTPATIENT)
Dept: HOME HEALTH SERVICES | Facility: HOSPITAL | Age: 88
End: 2023-12-05
Payer: MEDICARE

## 2023-12-05 ENCOUNTER — EXTERNAL HOME HEALTH (OUTPATIENT)
Dept: HOME HEALTH SERVICES | Facility: HOSPITAL | Age: 88
End: 2023-12-05
Payer: MEDICARE

## 2023-12-19 ENCOUNTER — DOCUMENT SCAN (OUTPATIENT)
Dept: HOME HEALTH SERVICES | Facility: HOSPITAL | Age: 88
End: 2023-12-19
Payer: MEDICARE

## 2024-01-04 ENCOUNTER — TELEPHONE (OUTPATIENT)
Dept: FAMILY MEDICINE | Facility: CLINIC | Age: 89
End: 2024-01-04
Payer: MEDICARE

## 2024-01-04 NOTE — TELEPHONE ENCOUNTER
----- Message from Glo Sanchez LPN sent at 1/4/2024  4:08 PM CST -----    ----- Message -----  From: Raquel Luz  Sent: 1/4/2024   4:06 PM CST  To: Joseph Hernandes Staff    - 3:46-[facility] with [facility] is calling her r leg has healed and the left leg has a superficial area that drained yellow drainage today, no odor, she is not walking or keeping them up. She fell yesterday coming from the bathroom and knows no details. Mild discomfort to the right upper arm but can move it. No bruises or anything. Rufuses the doctor to have evaluated   [phone]

## 2024-01-04 NOTE — TELEPHONE ENCOUNTER
Spoke with Anahi at [facility] who states pts would is  very superficial. She states its probably draining due to the swelling of her legs. She will send a pic tomorrow through email.

## 2024-01-08 ENCOUNTER — TELEPHONE (OUTPATIENT)
Dept: FAMILY MEDICINE | Facility: CLINIC | Age: 89
End: 2024-01-08
Payer: MEDICARE

## 2024-01-08 NOTE — TELEPHONE ENCOUNTER
"Spoke with [facility] and informed them per Ivanna, "cont wound care as prescribed. Wound does not look infected. Pt needs to wrap the site. OK to decrease to twice a week."    She voiced understanding and stated they are going to recertify pt for 60 days to continue wound care. Will send over forms for Ivanna to sign. Ivanna informed.   "

## 2024-01-08 NOTE — TELEPHONE ENCOUNTER
----- Message from Raquel Luz sent at 1/8/2024  8:38 AM CST -----  Anahi with [facility] is letting us know they sent pics over. They are cleaning with saline and patting dry with kimber. Applying bactroban, covering with adaptive, wrapping with conform, then coban. They would like to decrease visits to twice a week instead of 3 times a week.   [phone]

## 2024-01-19 ENCOUNTER — EXTERNAL HOME HEALTH (OUTPATIENT)
Dept: HOME HEALTH SERVICES | Facility: HOSPITAL | Age: 89
End: 2024-01-19
Payer: MEDICARE

## 2024-02-07 ENCOUNTER — TELEPHONE (OUTPATIENT)
Dept: FAMILY MEDICINE | Facility: CLINIC | Age: 89
End: 2024-02-07
Payer: MEDICARE

## 2024-02-07 NOTE — TELEPHONE ENCOUNTER
----- Message from Ivanna Frye sent at 2/7/2024  2:29 PM CST -----  Contact: Alayna  Pt needs paperwork for [facility] filled out and admit orders as well. Please advise. Alayna @[phone]

## 2024-02-08 ENCOUNTER — TELEPHONE (OUTPATIENT)
Dept: FAMILY MEDICINE | Facility: CLINIC | Age: 89
End: 2024-02-08
Payer: MEDICARE

## 2024-02-08 NOTE — TELEPHONE ENCOUNTER
----- Message from Sushila Smith sent at 2/8/2024  2:59 PM CST -----  Regarding: forms  Pts dtr dropped off forms she had spoken to Ivanna about these being done so is aware  given to Ana Lilia leonardb

## 2024-02-12 ENCOUNTER — TELEPHONE (OUTPATIENT)
Dept: FAMILY MEDICINE | Facility: CLINIC | Age: 89
End: 2024-02-12
Payer: MEDICARE

## 2024-02-12 NOTE — TELEPHONE ENCOUNTER
----- Message from Raquel Luz sent at 2/12/2024  2:00 PM CST -----  Vm- 1:21- daughter ghazal is calling about the records that were supposed to be sent to [facility]  [phone] ghazal

## 2024-02-12 NOTE — TELEPHONE ENCOUNTER
Spoke to Sydney and she stated they are trying to get pt in [facility]. Let her know Dr. Hernandes is out of the office and will be back Wednesday and we will call when forms are ready. Sydney verbalized understanding

## 2024-02-14 ENCOUNTER — DOCUMENT SCAN (OUTPATIENT)
Dept: HOME HEALTH SERVICES | Facility: HOSPITAL | Age: 89
End: 2024-02-14
Payer: MEDICARE

## 2024-02-15 ENCOUNTER — OUTSIDE PLACE OF SERVICE (OUTPATIENT)
Dept: FAMILY MEDICINE | Facility: CLINIC | Age: 89
End: 2024-02-15
Payer: MEDICARE

## 2024-02-15 DIAGNOSIS — E03.9 HYPOTHYROID: ICD-10-CM

## 2024-02-15 DIAGNOSIS — F33.0 MILD EPISODE OF RECURRENT MAJOR DEPRESSIVE DISORDER: ICD-10-CM

## 2024-02-15 DIAGNOSIS — I10 PRIMARY HYPERTENSION: ICD-10-CM

## 2024-02-15 DIAGNOSIS — E66.01 MORBID OBESITY DUE TO EXCESS CALORIES: ICD-10-CM

## 2024-02-15 DIAGNOSIS — F03.90 DEMENTIA: ICD-10-CM

## 2024-02-15 DIAGNOSIS — I87.2 CHRONIC VENOUS STASIS DERMATITIS OF BOTH LOWER EXTREMITIES: Primary | ICD-10-CM

## 2024-02-15 PROCEDURE — 99349 HOME/RES VST EST MOD MDM 40: CPT | Mod: S$GLB,,, | Performed by: FAMILY MEDICINE

## 2024-02-16 DIAGNOSIS — L97.901 ULCER OF LOWER EXTREMITY, LIMITED TO BREAKDOWN OF SKIN, UNSPECIFIED LATERALITY: Primary | ICD-10-CM

## 2024-02-16 RX ORDER — DOXYCYCLINE 100 MG/1
100 CAPSULE ORAL 2 TIMES DAILY
Qty: 50 CAPSULE | Refills: 0 | Status: SHIPPED | OUTPATIENT
Start: 2024-02-16 | End: 2024-04-22

## 2024-02-20 ENCOUNTER — TELEPHONE (OUTPATIENT)
Dept: FAMILY MEDICINE | Facility: CLINIC | Age: 89
End: 2024-02-20
Payer: MEDICARE

## 2024-02-20 NOTE — TELEPHONE ENCOUNTER
Letter faxed to [facility] re[facility] in [place] to get copy of CXR results.

## 2024-02-20 NOTE — LETTER
[address] [facility] [address]  [place] [address]  Phone: [phone]   [phone]                        MD Angelita Miranda, MD Jarrett Hudson PA-C Linda Melerine, CARIN Villasenor, CARIN Shah, CARIN      Date: 02/20/2024        Patient: Ghazala Hagen  YOB: 1935    To whom it may concern:    Please fax over last chest x-ray results for patient above.       Sincerely,     Samia Stevens LPN

## 2024-02-20 NOTE — TELEPHONE ENCOUNTER
Forms to [facility] have been successfully faxed and scanned into chart. Informing Sydney and copy is left at the .   Copy of CXR results requested but Sydney may need to go by an  a copy.   Sydney verbalized understanding.

## 2024-02-22 DIAGNOSIS — L03.116 CELLULITIS OF LEFT FOOT: ICD-10-CM

## 2024-02-22 DIAGNOSIS — I87.301 IDIOPATHIC CHRONIC VENOUS HYPERTENSION OF RIGHT LOWER EXTREMITY WITHOUT COMPLICATIONS: Primary | ICD-10-CM

## 2024-02-22 DIAGNOSIS — R60.9 EDEMA: ICD-10-CM

## 2024-02-22 DIAGNOSIS — L03.115 CELLULITIS OF RIGHT FOOT: ICD-10-CM

## 2024-02-22 DIAGNOSIS — S81.802A WOUND OF LEFT LEG: ICD-10-CM

## 2024-02-22 DIAGNOSIS — I73.9 PERIPHERAL VASCULAR DISEASE, UNSPECIFIED: ICD-10-CM

## 2024-02-23 DIAGNOSIS — I73.9 PERIPHERAL VASCULAR DISEASE, UNSPECIFIED: ICD-10-CM

## 2024-02-23 DIAGNOSIS — S81.801A WOUND OF RIGHT LEG: ICD-10-CM

## 2024-02-23 DIAGNOSIS — L03.116 CELLULITIS OF LEFT FOOT: ICD-10-CM

## 2024-02-23 DIAGNOSIS — R60.9 EDEMA: ICD-10-CM

## 2024-02-23 DIAGNOSIS — S81.802A WOUND OF LEFT LEG: ICD-10-CM

## 2024-02-23 DIAGNOSIS — I87.301 IDIOPATHIC CHRONIC VENOUS HYPERTENSION OF RIGHT LOWER EXTREMITY WITHOUT COMPLICATIONS: Primary | ICD-10-CM

## 2024-02-23 DIAGNOSIS — L03.115 CELLULITIS OF RIGHT FOOT: ICD-10-CM

## 2024-03-10 ENCOUNTER — DOCUMENT SCAN (OUTPATIENT)
Dept: HOME HEALTH SERVICES | Facility: HOSPITAL | Age: 89
End: 2024-03-10
Payer: MEDICARE

## 2024-03-17 ENCOUNTER — HOSPITAL ENCOUNTER (EMERGENCY)
Facility: HOSPITAL | Age: 89
Discharge: HOME | End: 2024-03-17
Attending: EMERGENCY MEDICINE
Payer: MEDICARE

## 2024-03-17 VITALS
WEIGHT: 271.19 LBS | DIASTOLIC BLOOD PRESSURE: 92 MMHG | SYSTOLIC BLOOD PRESSURE: 141 MMHG | HEART RATE: 81 BPM | TEMPERATURE: 98 F | OXYGEN SATURATION: 95 % | RESPIRATION RATE: 18 BRPM

## 2024-03-17 DIAGNOSIS — I87.2 CHRONIC VENOUS STASIS DERMATITIS OF BOTH LOWER EXTREMITIES: ICD-10-CM

## 2024-03-17 DIAGNOSIS — L03.119 CELLULITIS OF LOWER EXTREMITY, UNSPECIFIED LATERALITY: Primary | ICD-10-CM

## 2024-03-17 LAB
ALBUMIN SERPL BCP-MCNC: 2.6 G/DL (ref 3.5–5.2)
ALBUMIN SERPL BCP-MCNC: 2.6 G/DL (ref 3.5–5.2)
ALP SERPL-CCNC: 165 U/L (ref 55–135)
ALP SERPL-CCNC: 165 U/L (ref 55–135)
ALT SERPL W/O P-5'-P-CCNC: 31 U/L (ref 10–44)
ALT SERPL W/O P-5'-P-CCNC: 33 U/L (ref 10–44)
ANION GAP SERPL CALC-SCNC: 11 MMOL/L (ref 8–16)
ANION GAP SERPL CALC-SCNC: 11 MMOL/L (ref 8–16)
AST SERPL-CCNC: 29 U/L (ref 10–40)
AST SERPL-CCNC: 39 U/L (ref 10–40)
BACTERIA #/AREA URNS HPF: ABNORMAL /HPF
BASOPHILS # BLD AUTO: 0.04 K/UL (ref 0–0.2)
BASOPHILS NFR BLD: 0.6 % (ref 0–1.9)
BILIRUB SERPL-MCNC: 0.7 MG/DL (ref 0.1–1)
BILIRUB SERPL-MCNC: 0.7 MG/DL (ref 0.1–1)
BILIRUB UR QL STRIP: NEGATIVE
BUN SERPL-MCNC: 21 MG/DL (ref 8–23)
BUN SERPL-MCNC: 22 MG/DL (ref 8–23)
CALCIUM SERPL-MCNC: 9.1 MG/DL (ref 8.7–10.5)
CALCIUM SERPL-MCNC: 9.2 MG/DL (ref 8.7–10.5)
CHLORIDE SERPL-SCNC: 100 MMOL/L (ref 95–110)
CHLORIDE SERPL-SCNC: 101 MMOL/L (ref 95–110)
CLARITY UR: CLEAR
CO2 SERPL-SCNC: 26 MMOL/L (ref 23–29)
CO2 SERPL-SCNC: 28 MMOL/L (ref 23–29)
COLOR UR: YELLOW
CREAT SERPL-MCNC: 0.8 MG/DL (ref 0.5–1.4)
CREAT SERPL-MCNC: 0.8 MG/DL (ref 0.5–1.4)
DIFFERENTIAL METHOD BLD: ABNORMAL
EOSINOPHIL # BLD AUTO: 0.1 K/UL (ref 0–0.5)
EOSINOPHIL NFR BLD: 1.8 % (ref 0–8)
ERYTHROCYTE [DISTWIDTH] IN BLOOD BY AUTOMATED COUNT: 18.3 % (ref 11.5–14.5)
EST. GFR  (NO RACE VARIABLE): >60 ML/MIN/1.73 M^2
EST. GFR  (NO RACE VARIABLE): >60 ML/MIN/1.73 M^2
GLUCOSE SERPL-MCNC: 123 MG/DL (ref 70–110)
GLUCOSE SERPL-MCNC: 133 MG/DL (ref 70–110)
GLUCOSE UR QL STRIP: NEGATIVE
HCT VFR BLD AUTO: 39.2 % (ref 37–48.5)
HGB BLD-MCNC: 12 G/DL (ref 12–16)
HGB UR QL STRIP: NEGATIVE
HYALINE CASTS #/AREA URNS LPF: 3 /LPF
IMM GRANULOCYTES # BLD AUTO: 0.06 K/UL (ref 0–0.04)
IMM GRANULOCYTES NFR BLD AUTO: 0.8 % (ref 0–0.5)
KETONES UR QL STRIP: ABNORMAL
LEUKOCYTE ESTERASE UR QL STRIP: NEGATIVE
LYMPHOCYTES # BLD AUTO: 1.6 K/UL (ref 1–4.8)
LYMPHOCYTES NFR BLD: 22 % (ref 18–48)
MCH RBC QN AUTO: 25.4 PG (ref 27–31)
MCHC RBC AUTO-ENTMCNC: 30.6 G/DL (ref 32–36)
MCV RBC AUTO: 83 FL (ref 82–98)
MICROSCOPIC COMMENT: ABNORMAL
MONOCYTES # BLD AUTO: 0.7 K/UL (ref 0.3–1)
MONOCYTES NFR BLD: 9.9 % (ref 4–15)
NEUTROPHILS # BLD AUTO: 4.7 K/UL (ref 1.8–7.7)
NEUTROPHILS NFR BLD: 64.9 % (ref 38–73)
NITRITE UR QL STRIP: NEGATIVE
NRBC BLD-RTO: 0 /100 WBC
PH UR STRIP: 6 [PH] (ref 5–8)
PLATELET # BLD AUTO: ABNORMAL K/UL (ref 150–450)
PLATELET BLD QL SMEAR: ABNORMAL
PMV BLD AUTO: 11.4 FL (ref 9.2–12.9)
POTASSIUM SERPL-SCNC: 4.5 MMOL/L (ref 3.5–5.1)
POTASSIUM SERPL-SCNC: 5.8 MMOL/L (ref 3.5–5.1)
PROT SERPL-MCNC: 7 G/DL (ref 6–8.4)
PROT SERPL-MCNC: 7.1 G/DL (ref 6–8.4)
PROT UR QL STRIP: ABNORMAL
RBC # BLD AUTO: 4.72 M/UL (ref 4–5.4)
RBC #/AREA URNS HPF: 1 /HPF (ref 0–4)
SODIUM SERPL-SCNC: 138 MMOL/L (ref 136–145)
SODIUM SERPL-SCNC: 139 MMOL/L (ref 136–145)
SP GR UR STRIP: >1.03 (ref 1–1.03)
SQUAMOUS #/AREA URNS HPF: 1 /HPF
URN SPEC COLLECT METH UR: ABNORMAL
UROBILINOGEN UR STRIP-ACNC: ABNORMAL EU/DL
WBC # BLD AUTO: 7.17 K/UL (ref 3.9–12.7)
WBC #/AREA URNS HPF: 2 /HPF (ref 0–5)

## 2024-03-17 PROCEDURE — 36415 COLL VENOUS BLD VENIPUNCTURE: CPT | Performed by: EMERGENCY MEDICINE

## 2024-03-17 PROCEDURE — 81000 URINALYSIS NONAUTO W/SCOPE: CPT | Performed by: PHYSICIAN ASSISTANT

## 2024-03-17 PROCEDURE — 85025 COMPLETE CBC W/AUTO DIFF WBC: CPT | Performed by: PHYSICIAN ASSISTANT

## 2024-03-17 PROCEDURE — 99284 EMERGENCY DEPT VISIT MOD MDM: CPT | Mod: 25

## 2024-03-17 PROCEDURE — 80053 COMPREHEN METABOLIC PANEL: CPT | Performed by: EMERGENCY MEDICINE

## 2024-03-17 PROCEDURE — 80053 COMPREHEN METABOLIC PANEL: CPT | Mod: 91 | Performed by: PHYSICIAN ASSISTANT

## 2024-03-17 PROCEDURE — 36415 COLL VENOUS BLD VENIPUNCTURE: CPT | Performed by: PHYSICIAN ASSISTANT

## 2024-03-17 RX ORDER — DOXYCYCLINE HYCLATE 100 MG
100 TABLET ORAL 2 TIMES DAILY
Qty: 20 TABLET | Refills: 0 | Status: SHIPPED | OUTPATIENT
Start: 2024-03-17 | End: 2024-03-17 | Stop reason: ALTCHOICE

## 2024-03-17 RX ORDER — CLINDAMYCIN HYDROCHLORIDE 300 MG/1
300 CAPSULE ORAL EVERY 6 HOURS
Qty: 28 CAPSULE | Refills: 0 | Status: SHIPPED | OUTPATIENT
Start: 2024-03-17 | End: 2024-03-24

## 2024-03-17 NOTE — ED NOTES
[facility] called to arrange pt getting back to apt. Spoke with caregiver Rosangela, states they do not have transport available tonight and is unable to set up transport for pt.

## 2024-03-17 NOTE — ED PROVIDER NOTES
Encounter Date: 3/17/2024       History     Chief Complaint   Patient presents with    Pt's daughter thinks pt has an infection     Pt brought to ED via EMS from [facility].  Pt's daughter wanted pt to be sent to the ED because she think that the pt has an infection somewhere.  Pt has no complaint.       Patient is an 88-year-old female who was sent due to concern for possible infection.  Patient has past medical history significant for hypertension, thyroid disease and dementia per her daughter.  She is assisted living facility were wound care sees her 3 times a week.  The daughter feels as if the wounds have worsened over the last 6 weeks.  Patient denies any complaints.  Denies fevers.  Denies worsening leg pain.  Denies nausea or vomiting.  Denies chest pain or shortness of breath.  Patient did not want to come to the emergency room.  Son at bedside. Daughter also states she has been more confused recently.     The history is provided by the patient, the EMS personnel and a relative.     Review of patient's allergies indicates:  No Known Allergies  Past Medical History:   Diagnosis Date    Hypertension     Thyroid disease      Past Surgical History:   Procedure Laterality Date    CHOLECYSTECTOMY      HIP SURGERY Left     s/p CHRIS complicated by joint infection/spacer    knee replacement Right     KNEE SURGERY Left     TKR    SHOULDER SURGERY Right     TONSILLECTOMY       Family History   Problem Relation Age of Onset    COPD Mother     Cancer Father      Social History     Tobacco Use    Smoking status: Never     Passive exposure: Never    Smokeless tobacco: Never   Substance Use Topics    Alcohol use: Not Currently    Drug use: Never     Review of Systems   Constitutional:  Negative for activity change, appetite change, chills and fever.   HENT:  Negative for congestion, rhinorrhea and sore throat.    Eyes:  Negative for redness and visual disturbance.   Respiratory:  Negative for cough, chest  tightness and shortness of breath.    Cardiovascular:  Negative for chest pain.   Gastrointestinal:  Negative for abdominal pain, diarrhea, nausea and vomiting.   Genitourinary:  Negative for dysuria and frequency.   Musculoskeletal:  Negative for back pain, neck pain and neck stiffness.   Skin:  Positive for wound. Negative for rash.   Neurological:  Negative for dizziness, syncope, numbness and headaches.       Physical Exam     Initial Vitals [03/17/24 1312]   BP Pulse Resp Temp SpO2   (!) 140/71 78 17 97.8 °F (36.6 °C) 98 %      MAP       --         Physical Exam    Nursing note and vitals reviewed.  Constitutional: Vital signs are normal. She appears well-developed and well-nourished. She is cooperative.  Non-toxic appearance. She does not have a sickly appearance.   HENT:   Head: Normocephalic and atraumatic.   Right Ear: External ear normal.   Left Ear: External ear normal.   Nose: Nose normal.   Mouth/Throat: Uvula is midline.   Eyes: Conjunctivae and lids are normal.   Neck: Neck supple.   Normal range of motion.   Full passive range of motion without pain.     Cardiovascular:  Normal rate, regular rhythm and normal heart sounds.     Exam reveals no gallop and no friction rub.       No murmur heard.  Pulmonary/Chest: Breath sounds normal. She has no wheezes. She has no rhonchi. She has no rales.   Abdominal: Abdomen is soft. There is no abdominal tenderness. There is no rebound and no guarding.   Musculoskeletal:      Cervical back: Full passive range of motion without pain, normal range of motion and neck supple.      Comments: 1+ pedal pulse bilaterally. Chronic venous stasis to bilateral lower extremities with no foul odor. Topical cream noted to wounds with some drainage noted. Mild erythema but no warmth. No calf tenderness. Negative homans' sign bilaterally.     Neurological: She is alert and oriented to person, place, and time. She has normal strength.   Following all commands.    Skin: Skin is warm,  dry and intact. No rash noted.         ED Course   Procedures  Labs Reviewed   CBC W/ AUTO DIFFERENTIAL - Abnormal; Notable for the following components:       Result Value    MCH 25.4 (*)     MCHC 30.6 (*)     RDW 18.3 (*)     Immature Granulocytes 0.8 (*)     Immature Grans (Abs) 0.06 (*)     Platelet Estimate Clumped (*)     All other components within normal limits   COMPREHENSIVE METABOLIC PANEL - Abnormal; Notable for the following components:    Potassium 5.8 (*)     Glucose 133 (*)     Albumin 2.6 (*)     Alkaline Phosphatase 165 (*)     All other components within normal limits   URINALYSIS - Abnormal; Notable for the following components:    Specific Gravity, UA >1.030 (*)     Protein, UA 1+ (*)     Ketones, UA Trace (*)     Urobilinogen, UA 4.0-6.0 (*)     All other components within normal limits   URINALYSIS MICROSCOPIC - Abnormal; Notable for the following components:    Hyaline Casts, UA 3 (*)     All other components within normal limits   COMPREHENSIVE METABOLIC PANEL - Abnormal; Notable for the following components:    Glucose 123 (*)     Albumin 2.6 (*)     Alkaline Phosphatase 165 (*)     All other components within normal limits          Imaging Results              CT Head Without Contrast (In process)                      Medications - No data to display  Medical Decision Making  Urgent evaluation of an 88-year-old female who presents by daughter's request due to concern for possible infection.  She is requesting blood work be obtained.  Patient denies any complaints.  She has chronic venous stasis to bilateral lower extremities with associated wounds for which she receives wound care 3 times a week.  She is currently on hospice care.  Denies fevers.  Daughter reports she has been more confused recently.  Patient denies nausea, vomiting, diarrhea, chest pain, shortness of breath or urinary symptoms.  She states the cream they recently started putting on her wounds causes it to burn  intermittently.  Wounds have been present for months and have progressively worsened over the last 5-6 weeks.  Vital signs are stable.  She has not tachycardic or febrile.  White count shows no leukocytosis.  Wounds were consistent with chronic venous stasis.  No sign of DVT.  She has some drainage from the wounds with no foul odor.  She has not currently on oral antibiotics per family.  CT shows no acute abnormalities.  She will be started on oral antibiotics with recommendations to follow up with wound care and primary care.  No sign of systemic illness or other concerning factors. Discussed results with patient. Return precautions given. Based on my clinical evaluation, I do not appreciate any immediate, emergent, or life threatening condition or etiology that warrants additional workup today and feel that the patient can be discharged with close follow up care.  Patient is to follow up with their primary care provider. All questions answered.         Amount and/or Complexity of Data Reviewed  Independent Historian: friend  External Data Reviewed: labs, radiology and notes.  Labs: ordered.  Radiology: ordered.    Risk  Prescription drug management.                                      Clinical Impression:  Final diagnoses:  [I87.2] Chronic venous stasis dermatitis of both lower extremities  [L03.119] Cellulitis of lower extremity, unspecified laterality (Primary)          ED Disposition Condition    Discharge Stable          ED Prescriptions       Medication Sig Dispense Start Date End Date Auth. Provider    doxycycline (VIBRA-TABS) 100 MG tablet  (Status: Discontinued) Take 1 tablet (100 mg total) by mouth 2 (two) times daily. for 10 days 20 tablet 3/17/2024 3/17/2024 Shanell Cotton PA-C    clindamycin (CLEOCIN) 300 MG capsule Take 1 capsule (300 mg total) by mouth every 6 (six) hours. for 7 days 28 capsule 3/17/2024 3/24/2024 Shanell Cotton PA-C          Follow-up Information        Follow up With Specialties Details Why Contact Info Additional Information    Ivanna Cutler, NP Family Medicine   [address] [facility] [facility]  [address]  [facility] [address]  [phone]       Formerly [facility] -  Emergency Medicine  As needed [address] [place] [address] [address] [place] [id number] 1st floor             Shanell Cotton PA-C  03/17/24 2005

## 2024-03-17 NOTE — DISCHARGE INSTRUCTIONS
Continue routine wound care.   Start oral antibiotics as prescribed.  Keep legs elevated.  Follow up with primary care next week.  Return for any new or worsening symptoms.

## 2024-04-09 ENCOUNTER — HOSPITAL ENCOUNTER (EMERGENCY)
Facility: HOSPITAL | Age: 89
Discharge: HOME | End: 2024-04-09
Attending: EMERGENCY MEDICINE
Payer: MEDICARE

## 2024-04-09 VITALS
TEMPERATURE: 98 F | BODY MASS INDEX: 50.02 KG/M2 | OXYGEN SATURATION: 95 % | HEART RATE: 93 BPM | HEIGHT: 64 IN | RESPIRATION RATE: 18 BRPM | SYSTOLIC BLOOD PRESSURE: 121 MMHG | WEIGHT: 293 LBS | DIASTOLIC BLOOD PRESSURE: 54 MMHG
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
## 2024-04-09 DIAGNOSIS — I87.2 CHRONIC VENOUS STASIS DERMATITIS OF BOTH LOWER EXTREMITIES: ICD-10-CM

## 2024-04-09 DIAGNOSIS — S40.021A ARM CONTUSION, RIGHT, INITIAL ENCOUNTER: Primary | ICD-10-CM

## 2024-04-09 PROCEDURE — 99283 EMERGENCY DEPT VISIT LOW MDM: CPT | Mod: 25

## 2024-04-09 RX ORDER — FUROSEMIDE 20 MG/1
20 TABLET ORAL DAILY
Qty: 90 TABLET | Refills: 1 | Status: SHIPPED | OUTPATIENT
Start: 2024-04-09 | End: 2025-04-09

## 2024-04-09 NOTE — ED PROVIDER NOTES
Encounter Date: 4/9/2024       History     Chief Complaint   Patient presents with    Fall     Sent from assisted living after a fall, patient has no complaints     Chief complaint: Fall    HPI:  88-year-old female presents with right arm pain after she slipped and fell at an assisted living.  She denies any other injury.  She has no head, neck, back, chest or abdominal pain.  She also denies hip pain.  She has a history of chronic lymphedema.  She also has a history of hypertension.      Review of patient's allergies indicates:  No Known Allergies  Past Medical History:   Diagnosis Date    Hypertension     Thyroid disease      Past Surgical History:   Procedure Laterality Date    CHOLECYSTECTOMY      HIP SURGERY Left     s/p CHRIS complicated by joint infection/spacer    knee replacement Right     KNEE SURGERY Left     TKR    SHOULDER SURGERY Right     TONSILLECTOMY       Family History   Problem Relation Age of Onset    COPD Mother     Cancer Father      Social History     Tobacco Use    Smoking status: Never     Passive exposure: Never    Smokeless tobacco: Never   Substance Use Topics    Alcohol use: Not Currently    Drug use: Never     Review of Systems   Constitutional:  Negative for fever.   Eyes:  Negative for visual disturbance.   Respiratory:  Negative for apnea and shortness of breath.    Cardiovascular:  Negative for chest pain and palpitations.   Gastrointestinal:  Negative for abdominal distention and abdominal pain.   Genitourinary:  Negative for difficulty urinating.   Musculoskeletal:  Positive for myalgias. Negative for back pain, joint swelling and neck pain.   Skin:  Negative for pallor and rash.   Neurological:  Negative for headaches.   Hematological:  Does not bruise/bleed easily.   Psychiatric/Behavioral:  Negative for agitation.        Physical Exam     Initial Vitals [04/09/24 0058]   BP Pulse Resp Temp SpO2   (!) 121/54 93 18 98.4 °F (36.9 °C) 95 %      MAP       --         Physical  Exam    Nursing note and vitals reviewed.  Constitutional: She appears well-developed and well-nourished.   HENT:   Head: Normocephalic and atraumatic.   Eyes: Conjunctivae are normal.   Neck: Neck supple.   Normal range of motion.  Cardiovascular:  Normal rate, regular rhythm and normal heart sounds.     Exam reveals no gallop and no friction rub.       No murmur heard.  Pulmonary/Chest: Effort normal and breath sounds normal. No respiratory distress. She has no wheezes. She has no rhonchi. She has no rales.   Abdominal: Abdomen is soft. She exhibits no distension. There is no abdominal tenderness.   Musculoskeletal:         General: Tenderness (right arm tenderness with no swelling or ecchymosis) present. Normal range of motion.      Cervical back: Normal range of motion and neck supple.     Neurological: She is alert and oriented to person, place, and time.   Skin: Skin is warm and dry. No erythema.   Psychiatric: She has a normal mood and affect.         ED Course   Procedures  Labs Reviewed - No data to display       Imaging Results              X-Ray Humerus 2 View Right (In process)                      Medications - No data to display  Medical Decision Making  88-year-old female presents with arm pain after mechanical fall.  She has no evidence of head, neck, back or hip injury.  Right arm x-rays demonstrate an intact shoulder prosthesis with no evidence of acute fracture.    Amount and/or Complexity of Data Reviewed  Radiology: ordered.                                      Clinical Impression:  Final diagnoses:  [S40.021A] Arm contusion, right, initial encounter (Primary)  [W19.XXXA] Fall          ED Disposition Condition    Discharge Stable          ED Prescriptions    None       Follow-up Information       Follow up With Specialties Details Why Contact Ivanna Arteaga NP Family Medicine In 1 week  [address] [facility] [facility]  [address]  [facility] [address]  [phone]               Jhon Ahuja III,  MD  04/09/24 0133

## 2024-04-09 NOTE — TELEPHONE ENCOUNTER
----- Message from Amber Piña sent at 4/9/2024  3:03 PM CDT -----  Contact: kennedy  Granddaughter kennedy  calling to refill pt fluid pill   Nani villanueva   [phone]

## 2024-04-10 ENCOUNTER — PATIENT OUTREACH (OUTPATIENT)
Dept: EMERGENCY MEDICINE | Facility: HOSPITAL | Age: 89
End: 2024-04-10

## 2024-04-22 ENCOUNTER — HOSPITAL ENCOUNTER (INPATIENT)
Facility: HOSPITAL | Age: 89
LOS: 4 days | Discharge: NURSING, REHAB OR LONG-TERM CARE | DRG: 194 | End: 2024-04-26
Attending: EMERGENCY MEDICINE | Admitting: INTERNAL MEDICINE
Payer: MEDICARE

## 2024-04-22 DIAGNOSIS — G93.40 ENCEPHALOPATHY: ICD-10-CM

## 2024-04-22 DIAGNOSIS — E87.1 HYPONATREMIA: Primary | ICD-10-CM

## 2024-04-22 DIAGNOSIS — J18.9 PNEUMONIA OF LEFT LOWER LOBE DUE TO INFECTIOUS ORGANISM: ICD-10-CM

## 2024-04-22 PROBLEM — E44.0 MALNUTRITION OF MODERATE DEGREE: Status: ACTIVE | Noted: 2024-04-22

## 2024-04-22 PROBLEM — I48.92 ATRIAL FLUTTER: Status: ACTIVE | Noted: 2024-04-22

## 2024-04-22 LAB
ALBUMIN SERPL BCP-MCNC: 2.8 G/DL (ref 3.5–5.2)
ALP SERPL-CCNC: 177 U/L (ref 55–135)
ALT SERPL W/O P-5'-P-CCNC: 36 U/L (ref 10–44)
ANION GAP SERPL CALC-SCNC: 12 MMOL/L (ref 8–16)
AST SERPL-CCNC: 50 U/L (ref 10–40)
BASOPHILS # BLD AUTO: 0.07 K/UL (ref 0–0.2)
BASOPHILS NFR BLD: 0.7 % (ref 0–1.9)
BILIRUB SERPL-MCNC: 1.1 MG/DL (ref 0.1–1)
BILIRUB UR QL STRIP: NEGATIVE
BUN SERPL-MCNC: 29 MG/DL (ref 8–23)
CALCIUM SERPL-MCNC: 9.5 MG/DL (ref 8.7–10.5)
CHLORIDE SERPL-SCNC: 95 MMOL/L (ref 95–110)
CLARITY UR: ABNORMAL
CO2 SERPL-SCNC: 24 MMOL/L (ref 23–29)
COLOR UR: YELLOW
CREAT SERPL-MCNC: 1 MG/DL (ref 0.5–1.4)
DIFFERENTIAL METHOD BLD: ABNORMAL
EOSINOPHIL # BLD AUTO: 0.2 K/UL (ref 0–0.5)
EOSINOPHIL NFR BLD: 2 % (ref 0–8)
ERYTHROCYTE [DISTWIDTH] IN BLOOD BY AUTOMATED COUNT: 20.1 % (ref 11.5–14.5)
EST. GFR  (NO RACE VARIABLE): 55 ML/MIN/1.73 M^2
GLUCOSE SERPL-MCNC: 122 MG/DL (ref 70–110)
GLUCOSE UR QL STRIP: NEGATIVE
HCT VFR BLD AUTO: 46.8 % (ref 37–48.5)
HGB BLD-MCNC: 14.7 G/DL (ref 12–16)
HGB UR QL STRIP: NEGATIVE
IMM GRANULOCYTES # BLD AUTO: 0.2 K/UL (ref 0–0.04)
IMM GRANULOCYTES NFR BLD AUTO: 2.1 % (ref 0–0.5)
INFLUENZA A, MOLECULAR: NEGATIVE
INFLUENZA B, MOLECULAR: NEGATIVE
KETONES UR QL STRIP: NEGATIVE
LACTATE SERPL-SCNC: 2.3 MMOL/L (ref 0.5–2.2)
LACTATE SERPL-SCNC: 4.1 MMOL/L (ref 0.5–2.2)
LEUKOCYTE ESTERASE UR QL STRIP: NEGATIVE
LYMPHOCYTES # BLD AUTO: 1.7 K/UL (ref 1–4.8)
LYMPHOCYTES NFR BLD: 18.1 % (ref 18–48)
MCH RBC QN AUTO: 25.9 PG (ref 27–31)
MCHC RBC AUTO-ENTMCNC: 31.4 G/DL (ref 32–36)
MCV RBC AUTO: 83 FL (ref 82–98)
MONOCYTES # BLD AUTO: 0.7 K/UL (ref 0.3–1)
MONOCYTES NFR BLD: 7.1 % (ref 4–15)
NEUTROPHILS # BLD AUTO: 6.6 K/UL (ref 1.8–7.7)
NEUTROPHILS NFR BLD: 70 % (ref 38–73)
NITRITE UR QL STRIP: NEGATIVE
NRBC BLD-RTO: 0 /100 WBC
PH UR STRIP: 5 [PH] (ref 5–8)
PLATELET # BLD AUTO: 240 K/UL (ref 150–450)
PMV BLD AUTO: 9.8 FL (ref 9.2–12.9)
POCT GLUCOSE: 117 MG/DL (ref 70–110)
POTASSIUM SERPL-SCNC: 4.9 MMOL/L (ref 3.5–5.1)
PROCALCITONIN SERPL IA-MCNC: 0.03 NG/ML (ref 0–0.5)
PROT SERPL-MCNC: 7.5 G/DL (ref 6–8.4)
PROT UR QL STRIP: NEGATIVE
RBC # BLD AUTO: 5.67 M/UL (ref 4–5.4)
SARS-COV-2 RDRP RESP QL NAA+PROBE: NEGATIVE
SODIUM SERPL-SCNC: 131 MMOL/L (ref 136–145)
SP GR UR STRIP: 1.01 (ref 1–1.03)
SPECIMEN SOURCE: NORMAL
URN SPEC COLLECT METH UR: ABNORMAL
UROBILINOGEN UR STRIP-ACNC: NEGATIVE EU/DL
WBC # BLD AUTO: 9.39 K/UL (ref 3.9–12.7)

## 2024-04-22 PROCEDURE — 63600175 PHARM REV CODE 636 W HCPCS: Performed by: EMERGENCY MEDICINE

## 2024-04-22 PROCEDURE — 36415 COLL VENOUS BLD VENIPUNCTURE: CPT

## 2024-04-22 PROCEDURE — 84443 ASSAY THYROID STIM HORMONE: CPT

## 2024-04-22 PROCEDURE — 81003 URINALYSIS AUTO W/O SCOPE: CPT | Performed by: EMERGENCY MEDICINE

## 2024-04-22 PROCEDURE — 25000003 PHARM REV CODE 250

## 2024-04-22 PROCEDURE — 96368 THER/DIAG CONCURRENT INF: CPT

## 2024-04-22 PROCEDURE — 93005 ELECTROCARDIOGRAM TRACING: CPT

## 2024-04-22 PROCEDURE — 87040 BLOOD CULTURE FOR BACTERIA: CPT | Mod: 59 | Performed by: EMERGENCY MEDICINE

## 2024-04-22 PROCEDURE — 85025 COMPLETE CBC W/AUTO DIFF WBC: CPT | Performed by: EMERGENCY MEDICINE

## 2024-04-22 PROCEDURE — 96365 THER/PROPH/DIAG IV INF INIT: CPT

## 2024-04-22 PROCEDURE — 99285 EMERGENCY DEPT VISIT HI MDM: CPT | Mod: 25

## 2024-04-22 PROCEDURE — 94761 N-INVAS EAR/PLS OXIMETRY MLT: CPT

## 2024-04-22 PROCEDURE — 84145 PROCALCITONIN (PCT): CPT

## 2024-04-22 PROCEDURE — 83605 ASSAY OF LACTIC ACID: CPT | Performed by: EMERGENCY MEDICINE

## 2024-04-22 PROCEDURE — 87502 INFLUENZA DNA AMP PROBE: CPT | Performed by: EMERGENCY MEDICINE

## 2024-04-22 PROCEDURE — 11000001 HC ACUTE MED/SURG PRIVATE ROOM

## 2024-04-22 PROCEDURE — 82962 GLUCOSE BLOOD TEST: CPT

## 2024-04-22 PROCEDURE — 25000003 PHARM REV CODE 250: Performed by: EMERGENCY MEDICINE

## 2024-04-22 PROCEDURE — 99900035 HC TECH TIME PER 15 MIN (STAT)

## 2024-04-22 PROCEDURE — 63600175 PHARM REV CODE 636 W HCPCS

## 2024-04-22 PROCEDURE — U0002 COVID-19 LAB TEST NON-CDC: HCPCS | Performed by: EMERGENCY MEDICINE

## 2024-04-22 PROCEDURE — 36415 COLL VENOUS BLD VENIPUNCTURE: CPT | Performed by: EMERGENCY MEDICINE

## 2024-04-22 PROCEDURE — 87154 CUL TYP ID BLD PTHGN 6+ TRGT: CPT | Performed by: EMERGENCY MEDICINE

## 2024-04-22 PROCEDURE — 93010 ELECTROCARDIOGRAM REPORT: CPT | Mod: ,,, | Performed by: INTERNAL MEDICINE

## 2024-04-22 PROCEDURE — 80053 COMPREHEN METABOLIC PANEL: CPT | Performed by: EMERGENCY MEDICINE

## 2024-04-22 RX ORDER — HYDROCODONE BITARTRATE AND ACETAMINOPHEN 5; 325 MG/1; MG/1
1 TABLET ORAL EVERY 6 HOURS PRN
Status: ON HOLD | COMMUNITY
Start: 2024-03-07 | End: 2024-04-26

## 2024-04-22 RX ORDER — SODIUM CHLORIDE, SODIUM LACTATE, POTASSIUM CHLORIDE, CALCIUM CHLORIDE 600; 310; 30; 20 MG/100ML; MG/100ML; MG/100ML; MG/100ML
INJECTION, SOLUTION INTRAVENOUS CONTINUOUS
Status: DISCONTINUED | OUTPATIENT
Start: 2024-04-22 | End: 2024-04-25

## 2024-04-22 RX ORDER — LEVOTHYROXINE SODIUM 100 UG/1
200 TABLET ORAL
Status: DISCONTINUED | OUTPATIENT
Start: 2024-04-23 | End: 2024-04-26 | Stop reason: HOSPADM

## 2024-04-22 RX ORDER — FUROSEMIDE 20 MG/1
20 TABLET ORAL DAILY
Status: DISCONTINUED | OUTPATIENT
Start: 2024-04-23 | End: 2024-04-26 | Stop reason: HOSPADM

## 2024-04-22 RX ORDER — ESCITALOPRAM OXALATE 10 MG/1
20 TABLET ORAL DAILY
Status: DISCONTINUED | OUTPATIENT
Start: 2024-04-23 | End: 2024-04-26 | Stop reason: HOSPADM

## 2024-04-22 RX ORDER — PANTOPRAZOLE SODIUM 40 MG/1
40 TABLET, DELAYED RELEASE ORAL DAILY
Status: DISCONTINUED | OUTPATIENT
Start: 2024-04-22 | End: 2024-04-26 | Stop reason: HOSPADM

## 2024-04-22 RX ORDER — LORAZEPAM 1 MG/1
1 TABLET ORAL EVERY 6 HOURS PRN
COMMUNITY
Start: 2024-03-07

## 2024-04-22 RX ORDER — ACETAMINOPHEN 325 MG/1
650 TABLET ORAL EVERY 4 HOURS PRN
Status: DISCONTINUED | OUTPATIENT
Start: 2024-04-22 | End: 2024-04-26 | Stop reason: HOSPADM

## 2024-04-22 RX ORDER — FLUCONAZOLE 100 MG/1
100 TABLET ORAL DAILY
Status: ON HOLD | COMMUNITY
Start: 2024-03-19 | End: 2024-04-26 | Stop reason: HOSPADM

## 2024-04-22 RX ORDER — SPIRONOLACTONE 25 MG/1
25 TABLET ORAL DAILY
COMMUNITY
Start: 2024-03-01

## 2024-04-22 RX ORDER — ENOXAPARIN SODIUM 100 MG/ML
60 INJECTION SUBCUTANEOUS EVERY 12 HOURS
Status: DISCONTINUED | OUTPATIENT
Start: 2024-04-22 | End: 2024-04-23

## 2024-04-22 RX ORDER — ONDANSETRON 4 MG/1
4 TABLET, ORALLY DISINTEGRATING ORAL EVERY 8 HOURS PRN
COMMUNITY
Start: 2024-02-27

## 2024-04-22 RX ORDER — ACETAMINOPHEN 650 MG/1
650 SUPPOSITORY RECTAL EVERY 4 HOURS PRN
COMMUNITY
Start: 2024-02-27 | End: 2024-04-22

## 2024-04-22 RX ORDER — PROCHLORPERAZINE EDISYLATE 5 MG/ML
5 INJECTION INTRAMUSCULAR; INTRAVENOUS EVERY 6 HOURS PRN
Status: DISCONTINUED | OUTPATIENT
Start: 2024-04-22 | End: 2024-04-26 | Stop reason: HOSPADM

## 2024-04-22 RX ORDER — SODIUM CHLORIDE 0.9 % (FLUSH) 0.9 %
10 SYRINGE (ML) INJECTION EVERY 12 HOURS PRN
Status: DISCONTINUED | OUTPATIENT
Start: 2024-04-22 | End: 2024-04-26 | Stop reason: HOSPADM

## 2024-04-22 RX ORDER — SPIRONOLACTONE 25 MG/1
25 TABLET ORAL DAILY
Status: DISCONTINUED | OUTPATIENT
Start: 2024-04-23 | End: 2024-04-26 | Stop reason: HOSPADM

## 2024-04-22 RX ORDER — IPRATROPIUM BROMIDE AND ALBUTEROL SULFATE 2.5; .5 MG/3ML; MG/3ML
3 SOLUTION RESPIRATORY (INHALATION) EVERY 6 HOURS PRN
Status: DISCONTINUED | OUTPATIENT
Start: 2024-04-22 | End: 2024-04-26 | Stop reason: HOSPADM

## 2024-04-22 RX ORDER — AMOXICILLIN 250 MG
1 CAPSULE ORAL 2 TIMES DAILY
Status: DISCONTINUED | OUTPATIENT
Start: 2024-04-22 | End: 2024-04-26 | Stop reason: HOSPADM

## 2024-04-22 RX ORDER — ONDANSETRON HYDROCHLORIDE 2 MG/ML
4 INJECTION, SOLUTION INTRAVENOUS EVERY 8 HOURS PRN
Status: DISCONTINUED | OUTPATIENT
Start: 2024-04-22 | End: 2024-04-26 | Stop reason: HOSPADM

## 2024-04-22 RX ORDER — MORPHINE SULFATE 20 MG/ML
20 SOLUTION ORAL EVERY 4 HOURS PRN
Status: ON HOLD | COMMUNITY
Start: 2024-02-28 | End: 2024-04-26 | Stop reason: HOSPADM

## 2024-04-22 RX ORDER — HYOSCYAMINE SULFATE 0.12 MG/1
0.12 TABLET, ORALLY DISINTEGRATING ORAL EVERY 6 HOURS PRN
COMMUNITY
Start: 2024-02-27 | End: 2024-04-22

## 2024-04-22 RX ORDER — METOPROLOL TARTRATE 50 MG/1
50 TABLET ORAL 2 TIMES DAILY
Status: DISCONTINUED | OUTPATIENT
Start: 2024-04-23 | End: 2024-04-26 | Stop reason: HOSPADM

## 2024-04-22 RX ADMIN — ACETAMINOPHEN 650 MG: 325 TABLET ORAL at 07:04

## 2024-04-22 RX ADMIN — ENOXAPARIN SODIUM 60 MG: 60 INJECTION SUBCUTANEOUS at 09:04

## 2024-04-22 RX ADMIN — SODIUM CHLORIDE 1000 ML: 9 INJECTION, SOLUTION INTRAVENOUS at 11:04

## 2024-04-22 RX ADMIN — CEFTRIAXONE 2 G: 2 INJECTION, POWDER, FOR SOLUTION INTRAMUSCULAR; INTRAVENOUS at 05:04

## 2024-04-22 RX ADMIN — DOCUSATE SODIUM AND SENNOSIDES 1 TABLET: 8.6; 5 TABLET, FILM COATED ORAL at 09:04

## 2024-04-22 RX ADMIN — SODIUM CHLORIDE, POTASSIUM CHLORIDE, SODIUM LACTATE AND CALCIUM CHLORIDE: 600; 310; 30; 20 INJECTION, SOLUTION INTRAVENOUS at 10:04

## 2024-04-22 RX ADMIN — PANTOPRAZOLE SODIUM 40 MG: 40 TABLET, DELAYED RELEASE ORAL at 10:04

## 2024-04-22 RX ADMIN — AZITHROMYCIN MONOHYDRATE 500 MG: 500 INJECTION, POWDER, LYOPHILIZED, FOR SOLUTION INTRAVENOUS at 05:04

## 2024-04-22 RX ADMIN — SODIUM CHLORIDE, SODIUM LACTATE, POTASSIUM CHLORIDE, AND CALCIUM CHLORIDE 1572 ML: .6; .31; .03; .02 INJECTION, SOLUTION INTRAVENOUS at 05:04

## 2024-04-22 NOTE — ED PROVIDER NOTES
Encounter Date: 4/22/2024       History     Chief Complaint   Patient presents with    abnormal labwork      Lactic acid 29 / na 128  / sed rate 79     87 yo F pmhx of hypertension, thyroid disease, chronic venous stasis and dementia presents today with abnormal labs from her PCP.  Per her nurse practitioner PCP, Ms Gallegos, she had a lactic acid of 29, sodium of 128 and sed rate of 79.  Rosy told me over the phone that said she did a septic workup 3 days ago on Friday because when she came to visit her in the office her blood pressure was  in the 90s.  Family does report worsening confusion and hallucinations.  They deny any fever but do report a cough and congestion.  They also report significantly worsening bilateral chronic venous stasis causing her mobility issues and difficulty with her ADLs that is progressed significantly in the past few weeks.  Denies any chest pain or shortness of breath.  Denies any abdominal pain dysuria hematuria nausea, vomiting diarrhea constipation or any other symptoms.    Of note family states she was not able to be cared for assistant living facility and they are in the process of trying to get her placed in a nursing home for further care given she is unable to complete her ADLs    The history is provided by the patient. No  was used.     Review of patient's allergies indicates:  No Known Allergies  Past Medical History:   Diagnosis Date    Hypertension     Thyroid disease      Past Surgical History:   Procedure Laterality Date    CHOLECYSTECTOMY      HIP SURGERY Left     s/p CHRIS complicated by joint infection/spacer    knee replacement Right     KNEE SURGERY Left     TKR    SHOULDER SURGERY Right     TONSILLECTOMY       Family History   Problem Relation Name Age of Onset    COPD Mother      Cancer Father       Social History     Tobacco Use    Smoking status: Never     Passive exposure: Never    Smokeless tobacco: Never   Substance Use Topics    Alcohol  use: Not Currently    Drug use: Never     Review of Systems    Physical Exam     Initial Vitals   BP Pulse Resp Temp SpO2   04/22/24 1409 04/22/24 1409 04/22/24 1409 04/22/24 1409 04/22/24 1140   118/77 73 18 97.7 °F (36.5 °C) 96 %      MAP       --                Physical Exam    Nursing note and vitals reviewed.  Constitutional: She appears well-developed. She is not diaphoretic. No distress.   HENT:   Head: Normocephalic and atraumatic.   Nose: Nose normal.   Eyes: EOM are normal. Pupils are equal, round, and reactive to light. No scleral icterus.   Neck: Neck supple.   Normal range of motion.  Cardiovascular:  Normal rate, regular rhythm, normal heart sounds and intact distal pulses.     Exam reveals no gallop and no friction rub.       No murmur heard.  Pulmonary/Chest: Breath sounds normal. No stridor. No respiratory distress. She has no wheezes. She has no rhonchi. She has no rales.   Abdominal: Abdomen is soft. Bowel sounds are normal. She exhibits no distension. There is no abdominal tenderness. There is no rebound and no guarding.   Musculoskeletal:         General: Tenderness and edema present.      Cervical back: Normal range of motion and neck supple.      Comments: 2+ pitting edema and erythema with scant serous drainage to bilateral lower extremities consistent with chronic venous stasis     Neurological: She is alert and oriented to person, place, and time. She has normal strength. No cranial nerve deficit or sensory deficit. GCS score is 15. GCS eye subscore is 4. GCS verbal subscore is 5. GCS motor subscore is 6.   Skin: Skin is warm and dry. Capillary refill takes less than 2 seconds. No rash noted.   Psychiatric: She has a normal mood and affect.         ED Course   Procedures  Labs Reviewed   CBC W/ AUTO DIFFERENTIAL - Abnormal; Notable for the following components:       Result Value    RBC 5.67 (*)     MCH 25.9 (*)     MCHC 31.4 (*)     RDW 20.1 (*)     Immature Granulocytes 2.1 (*)      Immature Grans (Abs) 0.20 (*)     All other components within normal limits   COMPREHENSIVE METABOLIC PANEL - Abnormal; Notable for the following components:    Sodium 131 (*)     Glucose 122 (*)     BUN 29 (*)     Albumin 2.8 (*)     Total Bilirubin 1.1 (*)     Alkaline Phosphatase 177 (*)     AST 50 (*)     eGFR 55 (*)     All other components within normal limits   LACTIC ACID, PLASMA - Abnormal; Notable for the following components:    Lactate (Lactic Acid) 2.3 (*)     All other components within normal limits   URINALYSIS, REFLEX TO URINE CULTURE - Abnormal; Notable for the following components:    Appearance, UA Hazy (*)     All other components within normal limits    Narrative:     Specimen Source->Urine   POCT GLUCOSE - Abnormal; Notable for the following components:    POCT Glucose 117 (*)     All other components within normal limits   INFLUENZA A & B BY MOLECULAR   CULTURE, BLOOD   CULTURE, BLOOD   SARS-COV-2 RNA AMPLIFICATION, QUAL   LACTIC ACID, PLASMA   POCT GLUCOSE MONITORING CONTINUOUS          Imaging Results              X-Ray Chest AP Portable (Final result)  Result time 04/22/24 15:52:07      Final result by Mary King MD (04/22/24 15:52:07)                   Impression:      New increase opacification left lateral lung base and that may be due to overlapping shadows or mild atelectasis.  Suggest EPA and lateral views of the chest for further evaluation if the patient's condition permits.      Electronically signed by: Mary King MD  Date:    04/22/2024  Time:    15:52               Narrative:    EXAMINATION:  XR CHEST AP PORTABLE    CLINICAL HISTORY:  Chest Pain;    TECHNIQUE:  Single frontal view of the chest was performed.    COMPARISON:  08/13/2014    FINDINGS:  Stable cardiomediastinal silhouette.  Right lung remains clear.  Increased opacification left lateral lung base.  Somewhat limited visualization on the portable film and nonspecific and may be due to overlapping  densities or mild atelectasis.  Costophrenic sulcus appears sharp.  Consider AP and lateral views of the chest the patient's condition permits                                       Medications   lactated ringers bolus 1,572 mL (1,572 mLs Intravenous New Bag 4/22/24 1715)   cefTRIAXone (ROCEPHIN) 2 g in dextrose 5 % in water (D5W) 100 mL IVPB (MB+) (0 g Intravenous Stopped 4/22/24 1745)   azithromycin (ZITHROMAX) 500 mg in dextrose 5 % (D5W) 250 mL IVPB (Vial-Mate) (500 mg Intravenous New Bag 4/22/24 1715)     Medical Decision Making  Amount and/or Complexity of Data Reviewed  Independent Historian:      Details: Most of the history is provided by patient's 3 children who were at bedside given patient's dementia she is unable to provide most of the history  Labs: ordered. Decision-making details documented in ED Course.  Radiology: ordered and independent interpretation performed. Decision-making details documented in ED Course.  ECG/medicine tests: ordered and independent interpretation performed. Decision-making details documented in ED Course.    Risk  Decision regarding hospitalization.               ED Course as of 04/22/24 1851   Mon Apr 22, 2024   1522 87 yo F pmhx of hypertension, thyroid disease, dementia and chronic venous stasis bilaterally presents for abnormal labs and worsening confusion per family.  Afebrile.  Vital signs reassuring.  Alert and oriented but pleasantly confused.  Nonfocal neurologic exam labs show lactic of 2.3 but no leukocytosis.  Mild hyponatremia 131 and borderline LFTs but no other severe electrolyte derangements.  Chest x-ray with left lower lobe pneumonia consistent with patient's story a recent cough and URI symptoms.  Will treat empirically with broad-spectrum antibiotics and given elevated lactic will give IV fluids.  Doubt septic.  Discussed with hospital medicine who accepted patient for further management. [BD]   1612 X-Ray Chest AP Portable [BD]   1613 Impression:     New  increase opacification left lateral lung base and that may be due to overlapping shadows or mild atelectasis.  Suggest EPA and lateral views of the chest for further evaluation if the patient's condition permits.        Electronically signed by:Mary King MD  Date:                                            04/22/2024  Time:                                           15:52   [BD]   164 EKG individually interpreted by me shows atrial flutter with a variable AV block.  Rate of 77.  Normal QRS.  Borderline prolonged QT interval.  Nonspecific ST changes.  No STEMI.   [BD]      ED Course User Index  [BD] Ravinder Babb MD                           Clinical Impression:  Final diagnoses:  [E87.1] Hyponatremia (Primary)  [G93.40] Encephalopathy  [J18.9] Pneumonia of left lower lobe due to infectious organism          ED Disposition Condition    Admit                 Ravinder Babb MD  04/22/24 8642

## 2024-04-22 NOTE — PHARMACY MED REC
"              .        Admission Medication History     The home medication history was taken by Leyla Abbasi.    You may go to "Admission" then "Reconcile Home Medications" tabs to review and/or act upon these items.     The home medication list has been updated by the Pharmacy department.   Please read ALL comments highlighted in yellow.   Please address this information as you see fit.    Feel free to contact us if you have any questions or require assistance.          Medications listed below were obtained from: Patient/family and Analytic software- [employer]  No current facility-administered medications on file prior to encounter.     Current Outpatient Medications on File Prior to Encounter   Medication Sig Dispense Refill    EScitalopram oxalate (LEXAPRO) 20 MG tablet Take 1 tablet (20 mg total) by mouth once daily. 90 tablet 3    furosemide (LASIX) 20 MG tablet Take 1 tablet (20 mg total) by mouth once daily. 90 tablet 1    HYDROcodone-acetaminophen (NORCO) 5-325 mg per tablet Take 1 tablet by mouth every 6 (six) hours as needed for Pain.      KRILL OIL ORAL Take 1 capsule by mouth once daily.      levothyroxine (SYNTHROID) 200 MCG tablet Take 1 tablet (200 mcg total) by mouth before breakfast. 90 tablet 3    LORazepam (ATIVAN) 1 MG tablet Take 1 mg by mouth every 6 (six) hours as needed for Anxiety.      meclizine (ANTIVERT) 25 mg tablet Take 1 tablet (25 mg total) by mouth 3 (three) times daily as needed for Dizziness. 60 tablet 2    metoprolol tartrate (LOPRESSOR) 50 MG tablet Take 1 tablet (50 mg total) by mouth 2 (two) times daily. 180 tablet 3    morphine 100 mg/5 mL (20 mg/mL) concentrated solution Take 20 mg by mouth every 4 (four) hours as needed for Pain.      multivitamin (ONE DAILY MULTIVITAMIN) per tablet Take 1 tablet by mouth once daily.      mupirocin (BACTROBAN) 2 % ointment Apply topically 2 (two) times daily. Clean legs daily and apply mupirocin ointment (Patient taking differently: " Apply 1 g topically 2 (two) times daily. Clean legs daily and apply mupirocin ointment) 60 g 5    nystatin (MYCOSTATIN) powder Apply topically 4 (four) times daily. (Patient taking differently: Apply 1 g topically 4 (four) times daily.) 120 g 5    ondansetron (ZOFRAN-ODT) 4 MG TbDL Take 4 mg by mouth every 8 (eight) hours as needed.      spironolactone (ALDACTONE) 25 MG tablet Take 25 mg by mouth once daily.      [DISCONTINUED] LEVSIN/SL 0.125 mg Subl Place 0.125 mg under the tongue every 6 (six) hours as needed.      fluconazole (DIFLUCAN) 100 MG tablet Take 100 mg by mouth once daily. (Patient not taking: Reported on 4/22/2024)      potassium chloride (K-TAB) 20 mEq Take 1 tablet (20 mEq total) by mouth once daily. (Patient not taking: Reported on 4/22/2024) 90 tablet 1    traZODone (DESYREL) 50 MG tablet Take 1 tablet (50 mg total) by mouth every evening. (Patient not taking: Reported on 4/22/2024) 30 tablet 3    [DISCONTINUED] acetaminophen (TYLENOL) 650 MG Supp Place 650 mg rectally every 4 (four) hours as needed.      [DISCONTINUED] calcium carbonate (OS-LYNNETTE) 600 mg calcium (1,500 mg) Tab Take 600 mg by mouth once daily.      [DISCONTINUED] doxycycline (MONODOX) 100 MG capsule Take 1 capsule (100 mg total) by mouth 2 (two) times daily. 50 capsule 0    [DISCONTINUED] ketoconazole (NIZORAL) 2 % shampoo Lather shampoo and clean skin folds daily for 1 week then 2-3 times a week 120 mL 2       Potential issues to be addressed PRIOR TO DISCHARGE  Patient reported not taking the following medications: (Diflucan, Potassium & Trazodone). These medications remain on the home medication list. Please address accordingly.     Leyla Abbasi  EXT [id number]

## 2024-04-23 PROBLEM — Z71.89 ACP (ADVANCE CARE PLANNING): Status: ACTIVE | Noted: 2024-04-23

## 2024-04-23 LAB
ANION GAP SERPL CALC-SCNC: 9 MMOL/L (ref 8–16)
BASOPHILS # BLD AUTO: 0.04 K/UL (ref 0–0.2)
BASOPHILS NFR BLD: 0.5 % (ref 0–1.9)
BUN SERPL-MCNC: 22 MG/DL (ref 8–23)
CALCIUM SERPL-MCNC: 8.5 MG/DL (ref 8.7–10.5)
CHLORIDE SERPL-SCNC: 98 MMOL/L (ref 95–110)
CO2 SERPL-SCNC: 26 MMOL/L (ref 23–29)
CREAT SERPL-MCNC: 0.8 MG/DL (ref 0.5–1.4)
DIFFERENTIAL METHOD BLD: ABNORMAL
EOSINOPHIL # BLD AUTO: 0.2 K/UL (ref 0–0.5)
EOSINOPHIL NFR BLD: 2.7 % (ref 0–8)
ERYTHROCYTE [DISTWIDTH] IN BLOOD BY AUTOMATED COUNT: 19.4 % (ref 11.5–14.5)
EST. GFR  (NO RACE VARIABLE): >60 ML/MIN/1.73 M^2
GLUCOSE SERPL-MCNC: 101 MG/DL (ref 70–110)
HCT VFR BLD AUTO: 40.8 % (ref 37–48.5)
HGB BLD-MCNC: 12.7 G/DL (ref 12–16)
IMM GRANULOCYTES # BLD AUTO: 0.18 K/UL (ref 0–0.04)
IMM GRANULOCYTES NFR BLD AUTO: 2.1 % (ref 0–0.5)
LACTATE SERPL-SCNC: 1.3 MMOL/L (ref 0.5–2.2)
LYMPHOCYTES # BLD AUTO: 1.4 K/UL (ref 1–4.8)
LYMPHOCYTES NFR BLD: 16.7 % (ref 18–48)
MAGNESIUM SERPL-MCNC: 1.6 MG/DL (ref 1.6–2.6)
MCH RBC QN AUTO: 26.2 PG (ref 27–31)
MCHC RBC AUTO-ENTMCNC: 31.1 G/DL (ref 32–36)
MCV RBC AUTO: 84 FL (ref 82–98)
MONOCYTES # BLD AUTO: 0.8 K/UL (ref 0.3–1)
MONOCYTES NFR BLD: 9.6 % (ref 4–15)
NEUTROPHILS # BLD AUTO: 5.8 K/UL (ref 1.8–7.7)
NEUTROPHILS NFR BLD: 68.4 % (ref 38–73)
NRBC BLD-RTO: 0 /100 WBC
PHOSPHATE SERPL-MCNC: 3.9 MG/DL (ref 2.7–4.5)
PLATELET # BLD AUTO: 228 K/UL (ref 150–450)
PMV BLD AUTO: 9.6 FL (ref 9.2–12.9)
POTASSIUM SERPL-SCNC: 3.8 MMOL/L (ref 3.5–5.1)
RBC # BLD AUTO: 4.84 M/UL (ref 4–5.4)
SODIUM SERPL-SCNC: 133 MMOL/L (ref 136–145)
TSH SERPL DL<=0.005 MIU/L-ACNC: 0.64 UIU/ML (ref 0.4–4)
WBC # BLD AUTO: 8.44 K/UL (ref 3.9–12.7)

## 2024-04-23 PROCEDURE — 83735 ASSAY OF MAGNESIUM: CPT

## 2024-04-23 PROCEDURE — 25000003 PHARM REV CODE 250

## 2024-04-23 PROCEDURE — 63600175 PHARM REV CODE 636 W HCPCS: Performed by: INTERNAL MEDICINE

## 2024-04-23 PROCEDURE — 97165 OT EVAL LOW COMPLEX 30 MIN: CPT

## 2024-04-23 PROCEDURE — 97110 THERAPEUTIC EXERCISES: CPT

## 2024-04-23 PROCEDURE — 94761 N-INVAS EAR/PLS OXIMETRY MLT: CPT

## 2024-04-23 PROCEDURE — 99900035 HC TECH TIME PER 15 MIN (STAT)

## 2024-04-23 PROCEDURE — 97162 PT EVAL MOD COMPLEX 30 MIN: CPT

## 2024-04-23 PROCEDURE — 11000001 HC ACUTE MED/SURG PRIVATE ROOM

## 2024-04-23 PROCEDURE — 63600175 PHARM REV CODE 636 W HCPCS

## 2024-04-23 PROCEDURE — 36415 COLL VENOUS BLD VENIPUNCTURE: CPT

## 2024-04-23 PROCEDURE — 25000003 PHARM REV CODE 250: Performed by: INTERNAL MEDICINE

## 2024-04-23 PROCEDURE — 84100 ASSAY OF PHOSPHORUS: CPT

## 2024-04-23 PROCEDURE — 80048 BASIC METABOLIC PNL TOTAL CA: CPT

## 2024-04-23 PROCEDURE — 85025 COMPLETE CBC W/AUTO DIFF WBC: CPT

## 2024-04-23 PROCEDURE — 83605 ASSAY OF LACTIC ACID: CPT

## 2024-04-23 RX ORDER — OXYCODONE AND ACETAMINOPHEN 7.5; 325 MG/1; MG/1
1 TABLET ORAL EVERY 6 HOURS PRN
Status: DISCONTINUED | OUTPATIENT
Start: 2024-04-23 | End: 2024-04-26 | Stop reason: HOSPADM

## 2024-04-23 RX ORDER — ENOXAPARIN SODIUM 100 MG/ML
40 INJECTION SUBCUTANEOUS EVERY 12 HOURS
Status: DISCONTINUED | OUTPATIENT
Start: 2024-04-23 | End: 2024-04-26 | Stop reason: HOSPADM

## 2024-04-23 RX ADMIN — VANCOMYCIN HYDROCHLORIDE 2000 MG: 1 INJECTION, POWDER, LYOPHILIZED, FOR SOLUTION INTRAVENOUS at 04:04

## 2024-04-23 RX ADMIN — METOPROLOL TARTRATE 50 MG: 50 TABLET, FILM COATED ORAL at 09:04

## 2024-04-23 RX ADMIN — FUROSEMIDE 20 MG: 20 TABLET ORAL at 09:04

## 2024-04-23 RX ADMIN — ACETAMINOPHEN 650 MG: 325 TABLET ORAL at 01:04

## 2024-04-23 RX ADMIN — METOPROLOL TARTRATE 50 MG: 50 TABLET, FILM COATED ORAL at 08:04

## 2024-04-23 RX ADMIN — PIPERACILLIN AND TAZOBACTAM 4.5 G: 4; .5 INJECTION, POWDER, LYOPHILIZED, FOR SOLUTION INTRAVENOUS; PARENTERAL at 11:04

## 2024-04-23 RX ADMIN — ESCITALOPRAM OXALATE 20 MG: 10 TABLET, FILM COATED ORAL at 09:04

## 2024-04-23 RX ADMIN — PIPERACILLIN AND TAZOBACTAM 4.5 G: 4; .5 INJECTION, POWDER, LYOPHILIZED, FOR SOLUTION INTRAVENOUS; PARENTERAL at 04:04

## 2024-04-23 RX ADMIN — SODIUM CHLORIDE, POTASSIUM CHLORIDE, SODIUM LACTATE AND CALCIUM CHLORIDE: 600; 310; 30; 20 INJECTION, SOLUTION INTRAVENOUS at 12:04

## 2024-04-23 RX ADMIN — PANTOPRAZOLE SODIUM 40 MG: 40 TABLET, DELAYED RELEASE ORAL at 09:04

## 2024-04-23 RX ADMIN — LEVOTHYROXINE SODIUM 200 MCG: 0.1 TABLET ORAL at 05:04

## 2024-04-23 RX ADMIN — DOCUSATE SODIUM AND SENNOSIDES 1 TABLET: 8.6; 5 TABLET, FILM COATED ORAL at 08:04

## 2024-04-23 RX ADMIN — OXYCODONE AND ACETAMINOPHEN 1 TABLET: 7.5; 325 TABLET ORAL at 03:04

## 2024-04-23 RX ADMIN — DOCUSATE SODIUM AND SENNOSIDES 1 TABLET: 8.6; 5 TABLET, FILM COATED ORAL at 09:04

## 2024-04-23 RX ADMIN — ENOXAPARIN SODIUM 40 MG: 40 INJECTION SUBCUTANEOUS at 09:04

## 2024-04-23 RX ADMIN — SPIRONOLACTONE 25 MG: 25 TABLET ORAL at 09:04

## 2024-04-23 RX ADMIN — OXYCODONE AND ACETAMINOPHEN 1 TABLET: 7.5; 325 TABLET ORAL at 09:04

## 2024-04-23 RX ADMIN — ENOXAPARIN SODIUM 40 MG: 40 INJECTION SUBCUTANEOUS at 08:04

## 2024-04-23 NOTE — ASSESSMENT & PLAN NOTE
Advance Care Planning     Date: 04/23/2024    Living Will  During this visit, I engaged the patient  in the voluntary advance care planning process.  The patient and I reviewed the role for advance directives and their purpose in directing future healthcare if the patient's unable to speak for herself.  At this point in time, the patient does have full decision-making capacity.  We discussed different extreme health states that she could experience, and reviewed what kind of medical care she would want in those situations.  The patient communicated that if she were comatose and had little chance of a meaningful recovery, she would want machines/life-sustaining treatments used.  Patient's daughter was present during interview.  I spent a total of 16 minutes engaging the patient in this advance care planning discussion.

## 2024-04-23 NOTE — PLAN OF CARE
Problem: Adult Inpatient Plan of Care  Goal: Plan of Care Review  Outcome: Progressing  Goal: Patient-Specific Goal (Individualized)  Outcome: Progressing  Goal: Absence of Hospital-Acquired Illness or Injury  Outcome: Progressing  Goal: Optimal Comfort and Wellbeing  Outcome: Progressing  Goal: Readiness for Transition of Care  Outcome: Progressing     Problem: Bariatric Environmental Safety  Goal: Safety Maintained with Care  Outcome: Progressing     Problem: Fluid Imbalance (Pneumonia)  Goal: Fluid Balance  Outcome: Progressing     Problem: Infection (Pneumonia)  Goal: Resolution of Infection Signs and Symptoms  Outcome: Progressing     Problem: Respiratory Compromise (Pneumonia)  Goal: Effective Oxygenation and Ventilation  Outcome: Progressing     Problem: Fall Injury Risk  Goal: Absence of Fall and Fall-Related Injury  Outcome: Progressing     Problem: Impaired Wound Healing  Goal: Optimal Wound Healing  Outcome: Progressing     Problem: Skin Injury Risk Increased  Goal: Skin Health and Integrity  Outcome: Progressing

## 2024-04-23 NOTE — PLAN OF CARE
Met with pt and Catia olmos, gave list of SNFs and daughter, selected [facility] and signed choice form.    Sent clinicals to SNFs via [employer] [facility] and faxed [facility], waiting for 142     04/23/24 1301   Post-Acute Status   Post-Acute Authorization Placement   Post-Acute Placement Status Referrals Sent   Patient choice form signed by patient/caregiver List from [facility] Post-Acute Care   Discharge Plan   Discharge Plan A Skilled Nursing Facility   Discharge Plan B Skilled Nursing Facility

## 2024-04-23 NOTE — PLAN OF CARE
Problem: Adult Inpatient Plan of Care  Goal: Plan of Care Review  4/23/2024 0354 by Lisa Fatima RN  Outcome: Not Progressing  4/23/2024 0353 by Lisa Fatima RN  Outcome: Not Progressing  Goal: Readiness for Transition of Care  4/23/2024 0354 by Lisa Fatima RN  Outcome: Not Progressing  4/23/2024 0353 by Lisa Fatima RN  Outcome: Not Progressing     Problem: Bariatric Environmental Safety  Goal: Safety Maintained with Care  4/23/2024 0354 by Lisa Fatima RN  Outcome: Not Progressing  4/23/2024 0353 by Lisa Fatima RN  Outcome: Not Progressing     Problem: Infection (Pneumonia)  Goal: Resolution of Infection Signs and Symptoms  4/23/2024 0354 by Lisa Fatima RN  Outcome: Not Progressing  4/23/2024 0353 by Lisa Fatima RN  Outcome: Not Progressing     Problem: Respiratory Compromise (Pneumonia)  Goal: Effective Oxygenation and Ventilation  Outcome: Not Progressing     Problem: Fall Injury Risk  Goal: Absence of Fall and Fall-Related Injury  4/23/2024 0354 by Lisa Fatima RN  Outcome: Not Progressing  4/23/2024 0353 by Lisa Fatima RN  Outcome: Not Progressing     Problem: Impaired Wound Healing  Goal: Optimal Wound Healing  Outcome: Not Progressing   Wound care provided. Pt refused rolled gauze and ace bandage on bilat lower extremities. Pt complaining of headache. Prn med given. Minimal relief provided. Pt and daughter states she fell a few weeks ago and has hand posterior head pain/headache since. Paula Womack NP made aware of situation. CT of head ordered

## 2024-04-23 NOTE — PT/OT/SLP EVAL
Occupational Therapy Evaluation    Name: Ghazala Hagen  MRN: [id number]  Admitting Diagnosis: Pneumonia  Recent Surgery: * No surgery found *    Recommendations:     Discharge Recommendations: Moderate Intensity Therapy  Level of Assistance Recommended: 24 hours significant assistance  Discharge Equipment Recommendations:  TBD  Barriers to discharge:     Assessment:     Ghazala Hagen is a 88 y.o. female with a medical diagnosis of Pneumonia. She presents with performance deficits affecting function including weakness, impaired endurance, impaired self care skills, impaired functional mobility, gait instability, impaired balance, impaired cognition and decreased lower extremity function.     Rehab Prognosis: Fair; patient would benefit from acute OT services to address these deficits and reach maximum level of function.    Plan:     Patient to be seen 6 x/week to address the above listed problems via self-care/home management, therapeutic activities, therapeutic exercises  Plan of Care Expires: 05/21/24  Plan of Care Reviewed with: patient    Subjective     Chief Complaint: Bilateral LE pain  Patient Comments/Goals: Pain relief  Pain/Comfort:  Pain Rating 1: 9/10  Location - Side 1: Bilateral  Location 1: leg    Patients cultural, spiritual, Anabaptist conflicts given the current situation: yes    Social History:  Living Environment: Patient lives at an assisted living facility.  Prior Level of Function: Prior to admission, patient requires assistance with ADLs including dressing, toileting and bathing.  Equipment Used at Home: rollator   DME owned (not currently used): none  Assistance Upon Discharge: facility staff    Objective:     Communicated with nurse Beasley prior to session. Patient found supine upon OT entry to room.    General Precautions: Standard, fall   Orthopedic Precautions: N/A  Braces: N/A    Respiratory Status: Room air    Occupational Performance    Bed Mobility:   Rolling/Turning to Left with total  assistance  Rolling/Turning to Right with total assistance    Activities of Daily Living:  Feeding: total assistance  Grooming: total assistance  Upper Body Dressing: total assistance  Lower Body Dressing: total assistance  Toileting: total assistance    Cognitive/Visual Perceptual:  Cognitive/Psychosocial Skills: -     Oriented to: Person  -     Follows Commands/attention: Inattentive  -     Communication: clear/fluent  -     Mood/Affect/Coping skills/emotional control: Lethargic and Withdrawn    Physical Exam:  Upper Extremity Range of Motion:  -       Right Upper Extremity: TBA  -       Left Upper Extremity: TBA  Upper Extremity Strength: TBA    AMPAC 6 Click ADL:  AMPAC Total Score: 9      Patient left HOB elevated with all lines intact, call button in reach, bed alarm on and patient's daughter present.    GOALS:   Multidisciplinary Problems       Occupational Therapy Goals          Problem: Occupational Therapy    Goal Priority Disciplines Outcome Interventions   Occupational Therapy Goal     OT, PT/OT Progressing    Description: Goals to be met by: 5/21/2024     Patient will increase functional independence with ADLs by performing:    Feeding with Minimal Assistance.  UE Dressing with Moderate Assistance.  Grooming while seated with Moderate Assistance.  Sitting at edge of bed x 15 minutes with Moderate Assistance.  Rolling to Bilateral with Moderate Assistance.                          History:     Past Medical History:   Diagnosis Date    Hypertension     Thyroid disease          Past Surgical History:   Procedure Laterality Date    CHOLECYSTECTOMY      HIP SURGERY Left     s/p CHRIS complicated by joint infection/spacer    knee replacement Right     KNEE SURGERY Left     TKR    SHOULDER SURGERY Right     TONSILLECTOMY         Time Tracking:     OT Date of Treatment: 04/23/24  OT Start Time: 1220  OT Stop Time: 1230  OT Total Time (min): 10 min    Billable Minutes: Evaluation 10    4/23/2024

## 2024-04-23 NOTE — SUBJECTIVE & OBJECTIVE
Interval History:  Patient with exhausted look, answering questions with closed eyes.  Daughter present at bedside.  Confirms full code status.  Previously managed by palliative Medicine at assisted living facility.  Patient with bilateral chronic lower extremity venous stasis ulcers.  Denies any new focal subjective complaints.  Reports occasional cough but no swallowing dysfunction.    Review of Systems   Reason unable to perform ROS: ROS largely performed by daughter at bedside due to patient dementia.   Constitutional:  Positive for activity change and fatigue.   HENT:  Positive for congestion and postnasal drip.    Respiratory:  Positive for cough.         Non productive   Cardiovascular:  Positive for leg swelling.   Musculoskeletal:  Positive for gait problem.   Skin:  Positive for color change and wound.   Psychiatric/Behavioral:  Positive for confusion and hallucinations.         Per daughter patient having hallucinations and increased confusion     Objective:     Vital Signs (Most Recent):  Temp: 97.5 °F (36.4 °C) (04/23/24 0714)  Pulse: 92 (04/23/24 0714)  Resp: 16 (04/23/24 0714)  BP: 118/83 (04/23/24 0714)  SpO2: 95 % (04/23/24 0714) Vital Signs (24h Range):  Temp:  [97.2 °F (36.2 °C)-98.1 °F (36.7 °C)] 97.5 °F (36.4 °C)  Pulse:  [73-92] 92  Resp:  [16-19] 16  SpO2:  [94 %-98 %] 95 %  BP: (100-118)/(55-83) 118/83     Weight: 124.8 kg (275 lb 2.2 oz)  Body mass index is 48.74 kg/m².    Intake/Output Summary (Last 24 hours) at 4/23/2024 1014  Last data filed at 4/23/2024 0300  Gross per 24 hour   Intake 480 ml   Output 225 ml   Net 255 ml         Physical Exam  Vitals reviewed.   Constitutional:       Appearance: She is obese. She is ill-appearing.      Comments: Chronically ill appearing   HENT:      Head: Normocephalic and atraumatic.      Mouth/Throat:      Mouth: Mucous membranes are dry.      Pharynx: Oropharynx is clear.   Eyes:      General:         Right eye: Discharge present.         Left eye:  Discharge present.     Extraocular Movements: Extraocular movements intact.      Pupils: Pupils are equal, round, and reactive to light.   Cardiovascular:      Rate and Rhythm: Rhythm irregular.      Pulses: Normal pulses.   Pulmonary:      Effort: Pulmonary effort is normal.      Breath sounds: Examination of the right-middle field reveals decreased breath sounds. Examination of the left-middle field reveals decreased breath sounds. Examination of the right-lower field reveals decreased breath sounds. Examination of the left-lower field reveals decreased breath sounds. Decreased breath sounds present.   Abdominal:      General: Bowel sounds are normal. There is no distension.      Palpations: Abdomen is soft.      Tenderness: There is no abdominal tenderness.   Musculoskeletal:         General: Swelling and tenderness present.      Cervical back: Normal range of motion and neck supple.      Right lower leg: Tenderness present. 2+ Edema present.      Left lower leg: Tenderness present. 2+ Edema present.      Right ankle: Swelling present. Tenderness present.      Left ankle: Swelling present. Tenderness present.      Right foot: Swelling and tenderness present.      Left foot: Swelling and tenderness present.   Skin:     General: Skin is warm and dry.      Capillary Refill: Capillary refill takes less than 2 seconds.             Comments: Bilateral lower legs within above margins are red, swollen, crusting and painful to touch   Neurological:      Mental Status: She is lethargic and confused.      GCS: GCS eye subscore is 3. GCS verbal subscore is 4. GCS motor subscore is 6.      Comments: Wakes to voice and obeys commands. Oriented to self and that she is in a hospital for an infection. Recognizes daughter at bedside             Significant Labs: All pertinent labs within the past 24 hours have been reviewed.  CBC:   Recent Labs   Lab 04/22/24  1607 04/23/24  0126   WBC 9.39 8.44   HGB 14.7 12.7   HCT 46.8 40.8     228     CMP:   Recent Labs   Lab 04/22/24  1607 04/23/24  0126   * 133*   K 4.9 3.8   CL 95 98   CO2 24 26   * 101   BUN 29* 22   CREATININE 1.0 0.8   CALCIUM 9.5 8.5*   PROT 7.5  --    ALBUMIN 2.8*  --    BILITOT 1.1*  --    ALKPHOS 177*  --    AST 50*  --    ALT 36  --    ANIONGAP 12 9     Microbiology Results (last 7 days)       Procedure Component Value Units Date/Time    Blood culture x two cultures. Draw prior to antibiotics. [[id number]] Collected: 04/22/24 1750    Order Status: Completed Specimen: Blood from Peripheral, Forearm, Left Updated: 04/23/24 0317     Blood Culture, Routine No Growth to date    Narrative:      Aerobic and anaerobic    Blood Culture #1 [[id number]] Collected: 04/22/24 1607    Order Status: Completed Specimen: Blood from Peripheral, Forearm, Left Updated: 04/23/24 0317     Blood Culture, Routine No Growth to date    Culture, Respiratory [[id number]]     Order Status: No result Specimen: Respiratory from Sputum     Influenza A & B by Molecular [[id number]] Collected: 04/22/24 1753    Order Status: Completed Specimen: Nasopharyngeal Swab Updated: 04/22/24 1823     Influenza A, Molecular Negative     Influenza B, Molecular Negative     Flu A & B Source Nasal swab    Blood culture x two cultures. Draw prior to antibiotics. [[id number]]     Order Status: Canceled Specimen: Blood from Peripheral, Forearm, Left           Significant Imaging:   CXR:  New increase opacification left lateral lung base and that may be due to overlapping shadows or mild atelectasis. Suggest EPA and lateral views of the chest for further evaluation if the patient's condition permits.

## 2024-04-23 NOTE — PROGRESS NOTES
[facility] Medicine  Progress Note    Patient Name: Ghazala Hagen  MRN: [id number]  Patient Class: IP- Inpatient   Admission Date: 4/22/2024  Length of Stay: 1 days  Attending Physician: Dorothy Ivy MD  Primary Care Provider: Lenore Hanson MD        Subjective:     Principal Problem:Pneumonia        HPI:  Ghazala Hagen is an 88 year old female with a previous medical history of HTN, Thyroid diease, dementia, and depression who was informed by PCP at [facility] to present to the emergency room for sepsis. Per blood work performed on Friday due blood pressure being in the 90's systolic she had lactic acid of 29, sodium of 128, and sed rate of 79. Her family brought her to [facility] initially to discuss options of nursing home placement. The patient currently resides in assisted living at [facility] and has a progressive need for more assistance with ADL's. Per daughter at bedside her mother sleeps most of the day and does not get of out bed and when she does she is only able to use her walker to go short distances in a room. The daughter also reports her mother has been increasingly confused with intermittent hallucinations. Currently the patient is also having bilateral lower extremity venous stasis wounds dressed by [facility] by their palliative wound care team for the past two months. Review of systems performed with daughter of patient endorsed that patient had mild upper respiratory symptoms of cough and congestion. Initial ED evaluation shows a lactic of 2.3, sodium of 131, albumin of 2.7 and elevated liver enzymes. CBC shows no leukocytosis and urine studies negative for infection. Chest xray reads as left lower lobe pneumonia and patient was initiated on azithromycin and ceftriaxone in emergency room. EKG read atrial flutter and patient on metoprolol 50mg BID but no anticoagulation per med rec performed by family. Upon examination patient wakes to voice,  obeys commands and is oriented to self and that she is in a hospital for an infection. Patient then endorses she just wants to rest. Patient to be admitted by hospital medicine for further evaluation and management.    Overview/Hospital Course:  No notes on file    Interval History:     Review of Systems   Reason unable to perform ROS: ROS largely performed by daughter at bedside due to patient dementia.   Constitutional:  Positive for activity change and fatigue.   HENT:  Positive for congestion and postnasal drip.    Respiratory:  Positive for cough.         Non productive   Cardiovascular:  Positive for leg swelling.   Musculoskeletal:  Positive for gait problem.   Skin:  Positive for color change and wound.   Psychiatric/Behavioral:  Positive for confusion and hallucinations.         Per daughter patient having hallucinations and increased confusion     Objective:     Vital Signs (Most Recent):  Temp: 97.5 °F (36.4 °C) (04/23/24 0714)  Pulse: 92 (04/23/24 0714)  Resp: 16 (04/23/24 0714)  BP: 118/83 (04/23/24 0714)  SpO2: 95 % (04/23/24 0714) Vital Signs (24h Range):  Temp:  [97.2 °F (36.2 °C)-98.1 °F (36.7 °C)] 97.5 °F (36.4 °C)  Pulse:  [73-92] 92  Resp:  [16-19] 16  SpO2:  [94 %-98 %] 95 %  BP: (100-118)/(55-83) 118/83     Weight: 124.8 kg (275 lb 2.2 oz)  Body mass index is 48.74 kg/m².    Intake/Output Summary (Last 24 hours) at 4/23/2024 1014  Last data filed at 4/23/2024 0300  Gross per 24 hour   Intake 480 ml   Output 225 ml   Net 255 ml         Physical Exam  Vitals reviewed.   Constitutional:       Appearance: She is obese. She is ill-appearing.      Comments: Chronically ill appearing   HENT:      Head: Normocephalic and atraumatic.      Mouth/Throat:      Mouth: Mucous membranes are dry.      Pharynx: Oropharynx is clear.   Eyes:      General:         Right eye: Discharge present.         Left eye: Discharge present.     Extraocular Movements: Extraocular movements intact.      Pupils: Pupils are  equal, round, and reactive to light.   Cardiovascular:      Rate and Rhythm: Rhythm irregular.      Pulses: Normal pulses.   Pulmonary:      Effort: Pulmonary effort is normal.      Breath sounds: Examination of the right-middle field reveals decreased breath sounds. Examination of the left-middle field reveals decreased breath sounds. Examination of the right-lower field reveals decreased breath sounds. Examination of the left-lower field reveals decreased breath sounds. Decreased breath sounds present.   Abdominal:      General: Bowel sounds are normal. There is no distension.      Palpations: Abdomen is soft.      Tenderness: There is no abdominal tenderness.   Musculoskeletal:         General: Swelling and tenderness present.      Cervical back: Normal range of motion and neck supple.      Right lower leg: Tenderness present. 2+ Edema present.      Left lower leg: Tenderness present. 2+ Edema present.      Right ankle: Swelling present. Tenderness present.      Left ankle: Swelling present. Tenderness present.      Right foot: Swelling and tenderness present.      Left foot: Swelling and tenderness present.   Skin:     General: Skin is warm and dry.      Capillary Refill: Capillary refill takes less than 2 seconds.             Comments: Bilateral lower legs within above margins are red, swollen, crusting and painful to touch   Neurological:      Mental Status: She is lethargic and confused.      GCS: GCS eye subscore is 3. GCS verbal subscore is 4. GCS motor subscore is 6.      Comments: Wakes to voice and obeys commands. Oriented to self and that she is in a hospital for an infection. Recognizes daughter at bedside             Significant Labs: All pertinent labs within the past 24 hours have been reviewed.  CBC:   Recent Labs   Lab 04/22/24  1607 04/23/24  0126   WBC 9.39 8.44   HGB 14.7 12.7   HCT 46.8 40.8    228     CMP:   Recent Labs   Lab 04/22/24  1607 04/23/24  0126   * 133*   K 4.9 3.8    CL 95 98   CO2 24 26   * 101   BUN 29* 22   CREATININE 1.0 0.8   CALCIUM 9.5 8.5*   PROT 7.5  --    ALBUMIN 2.8*  --    BILITOT 1.1*  --    ALKPHOS 177*  --    AST 50*  --    ALT 36  --    ANIONGAP 12 9     Microbiology Results (last 7 days)       Procedure Component Value Units Date/Time    Blood culture x two cultures. Draw prior to antibiotics. [[id number]] Collected: 04/22/24 1750    Order Status: Completed Specimen: Blood from Peripheral, Forearm, Left Updated: 04/23/24 0317     Blood Culture, Routine No Growth to date    Narrative:      Aerobic and anaerobic    Blood Culture #1 [[id number]] Collected: 04/22/24 1607    Order Status: Completed Specimen: Blood from Peripheral, Forearm, Left Updated: 04/23/24 0317     Blood Culture, Routine No Growth to date    Culture, Respiratory [[id number]]     Order Status: No result Specimen: Respiratory from Sputum     Influenza A & B by Molecular [[id number]] Collected: 04/22/24 1753    Order Status: Completed Specimen: Nasopharyngeal Swab Updated: 04/22/24 1823     Influenza A, Molecular Negative     Influenza B, Molecular Negative     Flu A & B Source Nasal swab    Blood culture x two cultures. Draw prior to antibiotics. [[id number]]     Order Status: Canceled Specimen: Blood from Peripheral, Forearm, Left           Significant Imaging:   CXR:  New increase opacification left lateral lung base and that may be due to overlapping shadows or mild atelectasis. Suggest EPA and lateral views of the chest for further evaluation if the patient's condition permits.     Assessment/Plan:      * Pneumonia  Chest xray shows possible pneumonia. Lactic Acid from 2.3-4.1    -Trend Lactic  -Additional fluid bolus  -IV azithromycin  -IV ceftriaxone  -Procalcitonin pending  -Monitor for signs/symptoms of deterioration  -cbc/cmp/mag/phos daily       Malnutrition of moderate degree  Most recent weight and BMI monitored-     Measurements:  Wt Readings from Last 1 Encounters:    04/22/24 124.8 kg (275 lb 2.2 oz)   Body mass index is 48.74 kg/m².    Albumin 2.7    -Inpatient consult to nutrition         Atrial flutter  Atrial flutter seen on EKG. Patient not on anticoagulation at home.     -Continue metoprolol 50mg BID  -Telemetry monitoring    Primary hypertension  Chronic, controlled. Latest blood pressure and vitals reviewed-     Temp:  [97.2 °F (36.2 °C)-98.1 °F (36.7 °C)]   Pulse:  [73-86]   Resp:  [17-18]   BP: (100-118)/(55-77)   SpO2:  [90 %-98 %] .   Home meds for hypertension were reviewed and noted below.   Hypertension Medications               furosemide (LASIX) 20 MG tablet Take 1 tablet (20 mg total) by mouth once daily.    metoprolol tartrate (LOPRESSOR) 50 MG tablet Take 1 tablet (50 mg total) by mouth 2 (two) times daily.    spironolactone (ALDACTONE) 25 MG tablet Take 25 mg by mouth once daily.            While in the hospital, will manage blood pressure as follows; Continue home antihypertensive regimen    Will utilize p.r.n. blood pressure medication only if patient's blood pressure greater than 180/110 and she develops symptoms such as worsening chest pain or shortness of breath.    Chronic venous stasis dermatitis of both lower extremities  Chronic problem for two months.    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 Dementia is moderate. The patient does not have signs of behavioral disturbance. Home dementia medications are Held or Continued: patient does not take any medications specific to dementia . Continue non-pharmacologic interventions to prevent delirium (No VS between 11PM-5AM, activity during day, opening blinds, providing glasses/hearing aids, and up in chair during daytime). Will avoid narcotics and benzos unless absolutely necessary. PRN anti-psychotics are not prescribed to avoid self harm behaviors.      VTE Risk Mitigation (From admission, onward)            Ordered     enoxaparin injection 40 mg  Every 12 hours         04/23/24 0751     IP VTE HIGH RISK PATIENT  Once         04/22/24 1909     Place sequential compression device  Until discontinued         04/22/24 1909                    Discharge Planning   MAIRA:      Code Status: Full Code   Is the patient medically ready for discharge?:     Reason for patient still in hospital (select all that apply): {HMREASONPATIENTINHOSP:34638}                     Dorothy Ivy MD  Department of Hospital Medicine   [facility]/Surg

## 2024-04-23 NOTE — PROGRESS NOTES
Pharmacist Renal Dose Adjustment Note    Ghazala Hagen is a 88 y.o. female being treated with the medication Enoxaparin    Patient Data:    Vital Signs (Most Recent):  Temp: 97.5 °F (36.4 °C) (04/23/24 0714)  Pulse: 92 (04/23/24 0714)  Resp: 16 (04/23/24 0714)  BP: 118/83 (04/23/24 0714)  SpO2: 95 % (04/23/24 0714) Vital Signs (72h Range):  Temp:  [97.2 °F (36.2 °C)-98.1 °F (36.7 °C)]   Pulse:  [73-92]   Resp:  [16-19]   BP: (100-118)/(55-83)   SpO2:  [94 %-98 %]      Recent Labs   Lab 04/22/24  1607 04/23/24  0126   CREATININE 1.0 0.8     Serum creatinine: 0.8 mg/dL 04/23/24 0126  Estimated creatinine clearance: 63.7 mL/min    The enoxaparin dose has been adjusted for Ghazala Hagen [id number] according to the pharmacy practice protocol.      Based on the patient's Estimated Creatinine Clearance: 63.7 mL/min (based on SCr of 0.8 mg/dL)., Body mass index is 48.74 kg/m²., and weight= 124.8 kg (275 lb 2.2 oz), the enoxaparin dose has been adjusted 40 mg every 12 hours.    Thank you,  Martir Donald

## 2024-04-23 NOTE — ASSESSMENT & PLAN NOTE
Most recent weight and BMI monitored-     Measurements:  Wt Readings from Last 1 Encounters:   04/22/24 124.8 kg (275 lb 2.2 oz)   Body mass index is 48.74 kg/m².    Albumin 2.7    -Inpatient consult to nutrition

## 2024-04-23 NOTE — ASSESSMENT & PLAN NOTE
Chronic problem for two months.    -IP consult to wound care nurse  -Pressure ulcer prevention

## 2024-04-23 NOTE — PLAN OF CARE
Problem: Physical Therapy  Goal: Physical Therapy Goal  Description: Goals to be met by: 2024     Patient will increase functional independence with mobility by performin. Supine to sit with Moderate Assistance  2. Sit to stand transfer with Moderate Assistance  3. Bed to chair transfer with Moderate Assistance using Rolling Walker  4. Gait  x 10 feet with Moderate Assistance using Rolling Walker.   5. Sitting at edge of bed x20 minutes with Moderate Assistance  6. Lower extremity exercise program x10-20 reps  Outcome: Progressing   PT eval initiated. Pt sleepy- c/o being cold/tired. Pt seen for thera ex to available range. Pt has weeping venous stasis both lower legs

## 2024-04-23 NOTE — PLAN OF CARE
Problem: Occupational Therapy  Goal: Occupational Therapy Goal  Description: Goals to be met by: 5/21/2024     Patient will increase functional independence with ADLs by performing:    Feeding with Minimal Assistance.  UE Dressing with Moderate Assistance.  Grooming while seated with Moderate Assistance.  Sitting at edge of bed x 15 minutes with Moderate Assistance.  Rolling to Bilateral with Moderate Assistance.     Outcome: Progressing     OT evaluation completed. POC established.

## 2024-04-23 NOTE — SUBJECTIVE & OBJECTIVE
Past Medical History:   Diagnosis Date    Hypertension     Thyroid disease        Past Surgical History:   Procedure Laterality Date    CHOLECYSTECTOMY      HIP SURGERY Left     s/p CHRIS complicated by joint infection/spacer    knee replacement Right     KNEE SURGERY Left     TKR    SHOULDER SURGERY Right     TONSILLECTOMY         Review of patient's allergies indicates:  No Known Allergies    Current Facility-Administered Medications   Medication Dose Route Frequency Provider Last Rate Last Admin    acetaminophen tablet 650 mg  650 mg Oral Q4H PRN Patricia Avilez NP   650 mg at 04/22/24 1914    albuterol-ipratropium 2.5 mg-0.5 mg/3 mL nebulizer solution 3 mL  3 mL Nebulization Q6H PRN Patricia Avilez NP        [START ON 4/23/2024] azithromycin (ZITHROMAX) 500 mg in dextrose 5 % (D5W) 250 mL IVPB (Vial-Mate)  500 mg Intravenous Q24H Patricia Avilez NP        [START ON 4/23/2024] cefTRIAXone (ROCEPHIN) 2 g in dextrose 5 % in water (D5W) 100 mL IVPB (MB+)  2 g Intravenous Q24H Patricia Avilez NP        enoxaparin injection 60 mg  60 mg Subcutaneous Q12H (prophylaxis, 0900/2100) Patricia Avilez NP   60 mg at 04/22/24 2115    [START ON 4/23/2024] EScitalopram oxalate tablet 20 mg  20 mg Oral Daily Patricia Avilez NP        [START ON 4/23/2024] furosemide tablet 20 mg  20 mg Oral Daily Patricia Avilez NP        lactated ringers infusion   Intravenous Continuous Patricia Avilez NP 75 mL/hr at 04/22/24 2235 New Bag at 04/22/24 2235    [START ON 4/23/2024] levothyroxine tablet 200 mcg  200 mcg Oral Before breakfast Patricia Avilez NP        [START ON 4/23/2024] metoprolol tartrate (LOPRESSOR) tablet 50 mg  50 mg Oral BID Patricia Avilez NP        ondansetron injection 4 mg  4 mg Intravenous Q8H PRN Patricia Avilez NP        pantoprazole EC tablet 40 mg  40 mg Oral Daily Patricia Avilez NP   40 mg at 04/22/24 1903    prochlorperazine injection Soln 5 mg  5 mg Intravenous Q6H PRN Francoissecarmelina,  Patricia ALDANA NP        senna-docusate 8.6-50 mg per tablet 1 tablet  1 tablet Oral BID Patricia Avilez NP   1 tablet at 04/22/24 2115    sodium chloride 0.9% bolus 1,000 mL 1,000 mL  1,000 mL Intravenous Once Patricia Avilez NP        sodium chloride 0.9% flush 10 mL  10 mL Intravenous Q12H PRN Patricia Avilez NP        [START ON 4/23/2024] spironolactone tablet 25 mg  25 mg Oral Daily Patricia Avilez NP         Family History       Problem Relation (Age of Onset)    COPD Mother    Cancer Father          Tobacco Use    Smoking status: Never     Passive exposure: Never    Smokeless tobacco: Never   Substance and Sexual Activity    Alcohol use: Not Currently    Drug use: Never    Sexual activity: Not on file     Review of Systems   Reason unable to perform ROS: ROS largely performed by daughter at bedside due to patient dementia.   Constitutional:  Positive for activity change and fatigue.   HENT:  Positive for congestion and postnasal drip.    Respiratory:  Positive for cough.         Non productive   Cardiovascular:  Positive for leg swelling.   Musculoskeletal:  Positive for gait problem.   Skin:  Positive for color change and wound.   Psychiatric/Behavioral:  Positive for confusion and hallucinations.         Per daughter patient having hallucinations and increased confusion     Objective:     Vital Signs (Most Recent):  Temp: 97.6 °F (36.4 °C) (04/22/24 2125)  Pulse: 78 (04/22/24 2043)  Resp: 17 (04/22/24 2043)  BP: 114/64 (04/22/24 2043)  SpO2: 97 % (04/22/24 2043) Vital Signs (24h Range):  Temp:  [97.2 °F (36.2 °C)-97.7 °F (36.5 °C)] 97.6 °F (36.4 °C)  Pulse:  [73-85] 78  Resp:  [17-18] 17  SpO2:  [94 %-98 %] 97 %  BP: (100-118)/(55-77) 114/64     Weight: 124.8 kg (275 lb 2.2 oz)  Body mass index is 48.74 kg/m².     Physical Exam  Vitals reviewed.   Constitutional:       Appearance: She is obese. She is ill-appearing.      Comments: Chronically ill appearing   HENT:      Head: Normocephalic and  atraumatic.      Mouth/Throat:      Mouth: Mucous membranes are dry.      Pharynx: Oropharynx is clear.   Eyes:      General:         Right eye: Discharge present.         Left eye: Discharge present.     Extraocular Movements: Extraocular movements intact.      Pupils: Pupils are equal, round, and reactive to light.   Cardiovascular:      Rate and Rhythm: Rhythm irregular.      Pulses: Normal pulses.   Pulmonary:      Effort: Pulmonary effort is normal.      Breath sounds: Examination of the right-middle field reveals decreased breath sounds. Examination of the left-middle field reveals decreased breath sounds. Examination of the right-lower field reveals decreased breath sounds. Examination of the left-lower field reveals decreased breath sounds. Decreased breath sounds present.   Abdominal:      General: Bowel sounds are normal. There is no distension.      Palpations: Abdomen is soft.      Tenderness: There is no abdominal tenderness.   Musculoskeletal:         General: Swelling and tenderness present.      Cervical back: Normal range of motion and neck supple.      Right lower leg: Tenderness present. 2+ Edema present.      Left lower leg: Tenderness present. 2+ Edema present.      Right ankle: Swelling present. Tenderness present.      Left ankle: Swelling present. Tenderness present.      Right foot: Swelling and tenderness present.      Left foot: Swelling and tenderness present.   Skin:     General: Skin is warm and dry.      Capillary Refill: Capillary refill takes less than 2 seconds.             Comments: Bilateral lower legs within above margins are red, swollen, crusting and painful to touch   Neurological:      Mental Status: She is lethargic and confused.      GCS: GCS eye subscore is 3. GCS verbal subscore is 4. GCS motor subscore is 6.      Comments: Wakes to voice and obeys commands. Oriented to self and that she is in a hospital for an infection. Recognizes daughter at bedside               CRANIAL NERVES     CN III, IV, VI   Pupils are equal, round, and reactive to light.       Significant Labs: All pertinent labs within the past 24 hours have been reviewed.  CBC:   Recent Labs   Lab 04/22/24  1607   WBC 9.39   HGB 14.7   HCT 46.8        CMP:   Recent Labs   Lab 04/22/24  1607   *   K 4.9   CL 95   CO2 24   *   BUN 29*   CREATININE 1.0   CALCIUM 9.5   PROT 7.5   ALBUMIN 2.8*   BILITOT 1.1*   ALKPHOS 177*   AST 50*   ALT 36   ANIONGAP 12     Lactic Acid:   Recent Labs   Lab 04/22/24  1607 04/22/24  1959   LACTATE 2.3* 4.1*     Urine Studies:   Recent Labs   Lab 04/22/24  1719   COLORU Yellow   APPEARANCEUA Hazy*   PHUR 5.0   SPECGRAV 1.010   PROTEINUA Negative   GLUCUA Negative   KETONESU Negative   BILIRUBINUA Negative   OCCULTUA Negative   NITRITE Negative   UROBILINOGEN Negative   LEUKOCYTESUR Negative     Procalcitonin: 0.03    Significant Imaging: I have reviewed all pertinent imaging results/findings within the past 24 hours.  EXAMINATION:  XR CHEST AP PORTABLE     CLINICAL HISTORY:  Chest Pain;     TECHNIQUE:  Single frontal view of the chest was performed.     COMPARISON:  08/13/2014     FINDINGS:  Stable cardiomediastinal silhouette.  Right lung remains clear.  Increased opacification left lateral lung base.  Somewhat limited visualization on the portable film and nonspecific and may be due to overlapping densities or mild atelectasis.  Costophrenic sulcus appears sharp.  Consider AP and lateral views of the chest the patient's condition permits     Impression:     New increase opacification left lateral lung base and that may be due to overlapping shadows or mild atelectasis.  Suggest EPA and lateral views of the chest for further evaluation if the patient's condition permits.        Electronically signed by:Mary King MD  Date:                                            04/22/2024  Time:                                           15:52

## 2024-04-23 NOTE — CONSULTS
"  [facility]/Surg  Adult Nutrition  Consult Note    SUMMARY     Recommendations   Continue Cardiac diet but add chopped meats with gravy  Added Boost Plus Bid and Sky QD  Encourage intake during meal rounds  and monitor need for appetite stimulant  Ordered prealbumin and CRP levels  Collaborate with health care providers as needed    Goal: intake > 50% upon RD F/U visit  Nutrition Goal Status: new  Communication of RD Recs: reviewed with physician    Nutrition Related Social Determinants of Health: SDOH: Lack of adequate food.    Comments; Daughter reports that her mother lives at an assistant living facility but does not leave her room to eat in the dining room with other residents and nursing aids therefor she eats very little in her room.  Difficult to determine wt loss d/t fluid.  Denies that she has N/V but decreased appetite " for some time and increased confusion".  Last BM on 4/21. Daughter is considering nursing home placement.     Assessment and Plan  Nutrition Problem  Moderate Chronic Malnutrition    Related to (etiology):   Insufficient nutrient intake > 1 month and reduced ADL's; Dementia    Signs and Symptoms (as evidenced by):   Chronic wounds; intake < 50%; muscle mass loss; fluid accumulation     Interventions/Recommendations (treatment strategy):  Encourage intake; chop meats for ease in chewing; add Boost Plus BID and Sky QD; monitor need for appetite stimulant    Nutrition Diagnosis Status:   New    Malnutrition Assessment  Malnutrition Context: chronic illness  Malnutrition Level: moderate        Energy Intake (Malnutrition): less than 75% for greater than or equal to 1 month  Fluid Accumulation (Malnutrition): moderate       Anterior Thigh Region (Muscle Loss): mild depletion           Reason for Assessment  Dx: Pneumonia; Moderate malnutrition; atrial flutter; HTN; Dementia; Chronic venous stasis dermatitis  PMH:  depression; Hypothyroidism  Reason For Assessment: " "consult  Diagnosis: other (see comments)  Interdisciplinary Rounds: did not attend  Nutrition Discharge Planning: pending    Nutrition Risk Screen    Nutrition Risk Screen: reduced oral intake over the last month    Nutrition/Diet History    Patient Reported Diet/Restrictions/Preferences: general  Spiritual, Cultural Beliefs, Congregational Practices, Values that Affect Care: no  Food Allergies: NKFA  Factors Affecting Nutritional Intake: impaired cognitive status/motor control    Anthropometrics  No hx of wt loss  Temp: 97.7 °F (36.5 °C)  Height: 5' 3" (160 cm)  Height (inches): 63 in  Weight Method: Bed Scale  Weight: 124.8 kg (275 lb 2.2 oz)  Weight (lb): 275.14 lb  Ideal Body Weight (IBW), Female: 115 lb  % Ideal Body Weight, Female (lb): 239.25 %  BMI (Calculated): 48.8  BMI Grade: greater than 40 - morbid obesity     Lab/Procedures/Meds   Latest Reference Range & Units Most Recent   Albumin 3.5 - 5.2 g/dL 2.8 (L)  4/22/24 16:07   (L): Data is abnormally low  Pertinent Labs Reviewed: reviewed  Pertinent Medications Reviewed: reviewed; lasix; lopressor; protonix; aldactone; senna; IV zosyn;  Lactated ringers infusing @ 75 ml/hr    Physical Findings/Assessment  Bialteral lower extremity venous stasis ulcers and associated dermatitis  Jovon score: 14     Estimated/Assessed Needs    Weight Used For Calorie Calculations: 95 kg (209 lb 7 oz) (based on adjsuted wt d/t obesity)  Energy Calorie Requirements (kcal): 2000 calories daily;  20/kg  Energy Need Method: Kcal/kg  Protein Requirements: 95 gm protein daily;  1/kg  Weight Used For Protein Calculations: 95 kg (209 lb 7 oz)     Estimated Fluid Requirement Method: RDA Method  RDA Method (mL): 2000     Nutrition Prescription Ordered    Current Diet Order: Cardiac  Oral Nutrition Supplement: none    Evaluation of Received Nutrient/Fluid Intake    Intake/Output Summary (Last 24 hours) at 4/23/2024 1526  Last data filed at 4/23/2024 0300  Gross per 24 hour   Intake 480 ml "   Output 225 ml   Net 255 ml     Energy Calories Required: meeting needs  Protein Required: not meeting needs  Fluid Required: meeting needs  Tolerance: tolerating  % Intake of Estimated Energy Needs: 25 - 50 %  % Meal Intake: 25 - 50 %    Nutrition Risk    Level of Risk/Frequency of Follow-up: high ; 2 x week    Monitor and Evaluation    Food and Nutrient Intake: food and beverage intake  Food and Nutrient Adminstration: diet order  Knowledge/Beliefs/Attitudes: beliefs and attitudes, food and nutrition knowledge/skill  Physical Activity and Function: nutrition-related ADLs and IADLs  Anthropometric Measurements: weight change  Biochemical Data, Medical Tests and Procedures: gastrointestinal profile, glucose/endocrine profile, electrolyte and renal panel, other (specify)  Nutrition-Focused Physical Findings: overall appearance     Nutrition Follow-Up  yes

## 2024-04-23 NOTE — ASSESSMENT & PLAN NOTE
Chronic, controlled. Latest blood pressure and vitals reviewed-     Temp:  [97.2 °F (36.2 °C)-98.1 °F (36.7 °C)]   Pulse:  [68-92]   Resp:  [16-19]   BP: (100-118)/(58-83)   SpO2:  [94 %-98 %] .   Home meds for hypertension were reviewed and noted below.   Hypertension Medications               furosemide (LASIX) 20 MG tablet Take 1 tablet (20 mg total) by mouth once daily.    metoprolol tartrate (LOPRESSOR) 50 MG tablet Take 1 tablet (50 mg total) by mouth 2 (two) times daily.    spironolactone (ALDACTONE) 25 MG tablet Take 25 mg by mouth once daily.            While in the hospital, will manage blood pressure as follows; Continue home antihypertensive regimen    Will utilize p.r.n. blood pressure medication only if patient's blood pressure greater than 180/110 and she develops symptoms such as worsening chest pain or shortness of breath.

## 2024-04-23 NOTE — ASSESSMENT & PLAN NOTE
Patient with dementia with likely etiology of alzheimer's dementia. Dementia is moderate. The patient does not have signs of behavioral disturbance. Home dementia medications are Held or Continued: patient does not take any medications specific to dementia . Continue non-pharmacologic interventions to prevent delirium (No VS between 11PM-5AM, activity during day, opening blinds, providing glasses/hearing aids, and up in chair during daytime). Will avoid narcotics and benzos unless absolutely necessary. PRN anti-psychotics are not prescribed to avoid self harm behaviors.

## 2024-04-23 NOTE — PROGRESS NOTES
"Pharmacokinetic Initial Assessment: IV Vancomycin    Assessment/Plan:    Initiate intravenous vancomycin with loading dose of 2000 mg once with subsequent doses when random concentrations are less than 20 mcg/mL  Desired empiric serum trough concentration is 15 to 20 mcg/mL  Draw vancomycin random level on 4/24 at 0830.  Pharmacy will continue to follow and monitor vancomycin.      Please contact pharmacy at extension [phone] with any questions regarding this assessment.     Thank you for the consult,   Raciel Ramsay       Patient brief summary:  Ghazala Hagen is a 88 y.o. female initiated on antimicrobial therapy with IV Vancomycin for treatment of suspected lower respiratory infection    Drug Allergies:   Review of patient's allergies indicates:  No Known Allergies    Actual Body Weight:   124.8 kg    Renal Function:   Estimated Creatinine Clearance: 63.7 mL/min (based on SCr of 0.8 mg/dL).,     Dialysis Method (if applicable):  N/A    CBC (last 72 hours):  Recent Labs   Lab Result Units 04/22/24  1607 04/23/24  0126   WBC K/uL 9.39 8.44   Hemoglobin g/dL 14.7 12.7   Hematocrit % 46.8 40.8   Platelets K/uL 240 228   Gran % % 70.0 68.4   Lymph % % 18.1 16.7*   Mono % % 7.1 9.6   Eosinophil % % 2.0 2.7   Basophil % % 0.7 0.5   Differential Method  Automated Automated       Metabolic Panel (last 72 hours):  Recent Labs   Lab Result Units 04/22/24  1607 04/22/24  1719 04/23/24  0126   Sodium mmol/L 131*  --  133*   Potassium mmol/L 4.9  --  3.8   Chloride mmol/L 95  --  98   CO2 mmol/L 24  --  26   Glucose mg/dL 122*  --  101   Glucose, UA   --  Negative  --    BUN mg/dL 29*  --  22   Creatinine mg/dL 1.0  --  0.8   Albumin g/dL 2.8*  --   --    Total Bilirubin mg/dL 1.1*  --   --    Alkaline Phosphatase U/L 177*  --   --    AST U/L 50*  --   --    ALT U/L 36  --   --    Magnesium mg/dL  --   --  1.6   Phosphorus mg/dL  --   --  3.9       Drug levels (last 3 results):  No results for input(s): "VANCOMYCINRA", " ""VANCORANDOM", "VANCOMYCINPE", "VANCOPEAK", "VANCOMYCINTR", "VANCOTROUGH" in the last 72 hours.    Microbiologic Results:  Microbiology Results (last 7 days)       Procedure Component Value Units Date/Time    Blood culture x two cultures. Draw prior to antibiotics. [[id number]] Collected: 04/22/24 1750    Order Status: Completed Specimen: Blood from Peripheral, Forearm, Left Updated: 04/23/24 0317     Blood Culture, Routine No Growth to date    Narrative:      Aerobic and anaerobic    Blood Culture #1 [[id number]] Collected: 04/22/24 1607    Order Status: Completed Specimen: Blood from Peripheral, Forearm, Left Updated: 04/23/24 0317     Blood Culture, Routine No Growth to date    Culture, Respiratory [[id number]]     Order Status: No result Specimen: Respiratory from Sputum     Influenza A & B by Molecular [[id number]] Collected: 04/22/24 1753    Order Status: Completed Specimen: Nasopharyngeal Swab Updated: 04/22/24 1823     Influenza A, Molecular Negative     Influenza B, Molecular Negative     Flu A & B Source Nasal swab    Blood culture x two cultures. Draw prior to antibiotics. [[id number]]     Order Status: Canceled Specimen: Blood from Peripheral, Forearm, Left             "

## 2024-04-23 NOTE — H&P
[facility]  History & Physical    Patient Name: Ghazala Hagen  MRN: [id number]  Patient Class: IP- Inpatient  Admission Date: 4/22/2024  Attending Physician: Dorothy Ivy MD   Primary Care Provider: Lenore Hanson MD         Patient information was obtained from patient, relative(s), past medical records, and ER records.     Subjective:     Principal Problem:Pneumonia    Chief Complaint:   Chief Complaint   Patient presents with    abnormal labwork      Lactic acid 29 / na 128  / sed rate 79        HPI: Ghazala Hagen is an 88 year old female with a previous medical history of HTN, Thyroid diease, dementia, and depression who was informed by PCP at [facility] to present to the emergency room for sepsis. Per blood work performed on Friday due blood pressure being in the 90's systolic she had lactic acid of 29, sodium of 128, and sed rate of 79. Her family brought her to [facility] initially to discuss options of nursing home placement. The patient currently resides in assisted living at [facility] and has a progressive need for more assistance with ADL's. Per daughter at bedside her mother sleeps most of the day and does not get of out bed and when she does she is only able to use her walker to go short distances in a room. The daughter also reports her mother has been increasingly confused with intermittent hallucinations. Currently the patient is also having bilateral lower extremity venous stasis wounds dressed by [facility] by their palliative wound care team for the past two months. Review of systems performed with daughter of patient endorsed that patient had mild upper respiratory symptoms of cough and congestion. Initial ED evaluation shows a lactic of 2.3, sodium of 131, albumin of 2.7 and elevated liver enzymes. CBC shows no leukocytosis and urine studies negative for infection. Chest xray reads as left lower lobe pneumonia and patient was initiated  on azithromycin and ceftriaxone in emergency room. EKG read atrial flutter and patient on metoprolol 50mg BID but no anticoagulation per med rec performed by family. Upon examination patient wakes to voice, obeys commands and is oriented to self and that she is in a hospital for an infection. Patient then endorses she just wants to rest. Patient to be admitted by hospital medicine for further evaluation and management.    Past Medical History:   Diagnosis Date    Hypertension     Thyroid disease        Past Surgical History:   Procedure Laterality Date    CHOLECYSTECTOMY      HIP SURGERY Left     s/p CHRIS complicated by joint infection/spacer    knee replacement Right     KNEE SURGERY Left     TKR    SHOULDER SURGERY Right     TONSILLECTOMY         Review of patient's allergies indicates:  No Known Allergies    Current Facility-Administered Medications   Medication Dose Route Frequency Provider Last Rate Last Admin    acetaminophen tablet 650 mg  650 mg Oral Q4H PRN Patricia Avilez NP   650 mg at 04/22/24 1914    albuterol-ipratropium 2.5 mg-0.5 mg/3 mL nebulizer solution 3 mL  3 mL Nebulization Q6H PRN Patricia Avilez NP        [START ON 4/23/2024] azithromycin (ZITHROMAX) 500 mg in dextrose 5 % (D5W) 250 mL IVPB (Vial-Mate)  500 mg Intravenous Q24H Patricia Avilez NP        [START ON 4/23/2024] cefTRIAXone (ROCEPHIN) 2 g in dextrose 5 % in water (D5W) 100 mL IVPB (MB+)  2 g Intravenous Q24H Patricia Avilez NP        enoxaparin injection 60 mg  60 mg Subcutaneous Q12H (prophylaxis, 0900/2100) Patricia Avilez NP   60 mg at 04/22/24 2115    [START ON 4/23/2024] EScitalopram oxalate tablet 20 mg  20 mg Oral Daily Patricia Avilez NP        [START ON 4/23/2024] furosemide tablet 20 mg  20 mg Oral Daily Patricia Avilez NP        lactated ringers infusion   Intravenous Continuous Patricia Avilez NP 75 mL/hr at 04/22/24 2235 New Bag at 04/22/24 2235    [START ON 4/23/2024] levothyroxine tablet 200  mcg  200 mcg Oral Before breakfast Patricia Avilez NP        [START ON 4/23/2024] metoprolol tartrate (LOPRESSOR) tablet 50 mg  50 mg Oral BID Patricia Avilez NP        ondansetron injection 4 mg  4 mg Intravenous Q8H PRN Patricia Avilez NP        pantoprazole EC tablet 40 mg  40 mg Oral Daily Patricia Avilez NP   40 mg at 04/22/24 2259    prochlorperazine injection Soln 5 mg  5 mg Intravenous Q6H PRN Patricia Avilez NP        senna-docusate 8.6-50 mg per tablet 1 tablet  1 tablet Oral BID Patricia Avilez NP   1 tablet at 04/22/24 2115    sodium chloride 0.9% bolus 1,000 mL 1,000 mL  1,000 mL Intravenous Once Patricia Avilez NP        sodium chloride 0.9% flush 10 mL  10 mL Intravenous Q12H PRN Patricia Avilez NP        [START ON 4/23/2024] spironolactone tablet 25 mg  25 mg Oral Daily Patricia Avilez NP         Family History       Problem Relation (Age of Onset)    COPD Mother    Cancer Father          Tobacco Use    Smoking status: Never     Passive exposure: Never    Smokeless tobacco: Never   Substance and Sexual Activity    Alcohol use: Not Currently    Drug use: Never    Sexual activity: Not on file     Review of Systems   Reason unable to perform ROS: ROS largely performed by daughter at bedside due to patient dementia.   Constitutional:  Positive for activity change and fatigue.   HENT:  Positive for congestion and postnasal drip.    Respiratory:  Positive for cough.         Non productive   Cardiovascular:  Positive for leg swelling.   Musculoskeletal:  Positive for gait problem.   Skin:  Positive for color change and wound.   Psychiatric/Behavioral:  Positive for confusion and hallucinations.         Per daughter patient having hallucinations and increased confusion     Objective:     Vital Signs (Most Recent):  Temp: 97.6 °F (36.4 °C) (04/22/24 2125)  Pulse: 78 (04/22/24 2043)  Resp: 17 (04/22/24 2043)  BP: 114/64 (04/22/24 2043)  SpO2: 97 % (04/22/24 2043) Vital Signs (24h  Range):  Temp:  [97.2 °F (36.2 °C)-97.7 °F (36.5 °C)] 97.6 °F (36.4 °C)  Pulse:  [73-85] 78  Resp:  [17-18] 17  SpO2:  [94 %-98 %] 97 %  BP: (100-118)/(55-77) 114/64     Weight: 124.8 kg (275 lb 2.2 oz)  Body mass index is 48.74 kg/m².     Physical Exam  Vitals reviewed.   Constitutional:       Appearance: She is obese. She is ill-appearing.      Comments: Chronically ill appearing   HENT:      Head: Normocephalic and atraumatic.      Mouth/Throat:      Mouth: Mucous membranes are dry.      Pharynx: Oropharynx is clear.   Eyes:      General:         Right eye: Discharge present.         Left eye: Discharge present.     Extraocular Movements: Extraocular movements intact.      Pupils: Pupils are equal, round, and reactive to light.   Cardiovascular:      Rate and Rhythm: Rhythm irregular.      Pulses: Normal pulses.   Pulmonary:      Effort: Pulmonary effort is normal.      Breath sounds: Examination of the right-middle field reveals decreased breath sounds. Examination of the left-middle field reveals decreased breath sounds. Examination of the right-lower field reveals decreased breath sounds. Examination of the left-lower field reveals decreased breath sounds. Decreased breath sounds present.   Abdominal:      General: Bowel sounds are normal. There is no distension.      Palpations: Abdomen is soft.      Tenderness: There is no abdominal tenderness.   Musculoskeletal:         General: Swelling and tenderness present.      Cervical back: Normal range of motion and neck supple.      Right lower leg: Tenderness present. 2+ Edema present.      Left lower leg: Tenderness present. 2+ Edema present.      Right ankle: Swelling present. Tenderness present.      Left ankle: Swelling present. Tenderness present.      Right foot: Swelling and tenderness present.      Left foot: Swelling and tenderness present.   Skin:     General: Skin is warm and dry.      Capillary Refill: Capillary refill takes less than 2 seconds.              Comments: Bilateral lower legs within above margins are red, swollen, crusting and painful to touch   Neurological:      Mental Status: She is lethargic and confused.      GCS: GCS eye subscore is 3. GCS verbal subscore is 4. GCS motor subscore is 6.      Comments: Wakes to voice and obeys commands. Oriented to self and that she is in a hospital for an infection. Recognizes daughter at bedside              CRANIAL NERVES     CN III, IV, VI   Pupils are equal, round, and reactive to light.       Significant Labs: All pertinent labs within the past 24 hours have been reviewed.  CBC:   Recent Labs   Lab 04/22/24  1607   WBC 9.39   HGB 14.7   HCT 46.8        CMP:   Recent Labs   Lab 04/22/24  1607   *   K 4.9   CL 95   CO2 24   *   BUN 29*   CREATININE 1.0   CALCIUM 9.5   PROT 7.5   ALBUMIN 2.8*   BILITOT 1.1*   ALKPHOS 177*   AST 50*   ALT 36   ANIONGAP 12     Lactic Acid:   Recent Labs   Lab 04/22/24  1607 04/22/24  1959   LACTATE 2.3* 4.1*     Urine Studies:   Recent Labs   Lab 04/22/24  1719   COLORU Yellow   APPEARANCEUA Hazy*   PHUR 5.0   SPECGRAV 1.010   PROTEINUA Negative   GLUCUA Negative   KETONESU Negative   BILIRUBINUA Negative   OCCULTUA Negative   NITRITE Negative   UROBILINOGEN Negative   LEUKOCYTESUR Negative     Procalcitonin: 0.03    Significant Imaging: I have reviewed all pertinent imaging results/findings within the past 24 hours.  EXAMINATION:  XR CHEST AP PORTABLE     CLINICAL HISTORY:  Chest Pain;     TECHNIQUE:  Single frontal view of the chest was performed.     COMPARISON:  08/13/2014     FINDINGS:  Stable cardiomediastinal silhouette.  Right lung remains clear.  Increased opacification left lateral lung base.  Somewhat limited visualization on the portable film and nonspecific and may be due to overlapping densities or mild atelectasis.  Costophrenic sulcus appears sharp.  Consider AP and lateral views of the chest the patient's condition permits     Impression:      New increase opacification left lateral lung base and that may be due to overlapping shadows or mild atelectasis.  Suggest EPA and lateral views of the chest for further evaluation if the patient's condition permits.        Electronically signed by:Mary King MD  Date:                                            04/22/2024  Time:                                           15:52  Assessment/Plan:     * Pneumonia  Chest xray shows possible pneumonia. Lactic Acid from 2.3-4.1    -Trend Lactic  -Additional fluid bolus  -IV azithromycin  -IV ceftriaxone  -Procalcitonin pending  -Monitor for signs/symptoms of deterioration  -cbc/cmp/mag/phos daily       Malnutrition of moderate degree  Most recent weight and BMI monitored-     Measurements:  Wt Readings from Last 1 Encounters:   04/22/24 124.8 kg (275 lb 2.2 oz)   Body mass index is 48.74 kg/m².    Albumin 2.7    -Inpatient consult to nutrition         Atrial flutter  Atrial flutter seen on EKG. Patient not on anticoagulation at home.     -Continue metoprolol 50mg BID  -Telemetry monitoring    Primary hypertension  Chronic, controlled. Latest blood pressure and vitals reviewed-     Temp:  [97.2 °F (36.2 °C)-98.1 °F (36.7 °C)]   Pulse:  [73-86]   Resp:  [17-18]   BP: (100-118)/(55-77)   SpO2:  [90 %-98 %] .   Home meds for hypertension were reviewed and noted below.   Hypertension Medications               furosemide (LASIX) 20 MG tablet Take 1 tablet (20 mg total) by mouth once daily.    metoprolol tartrate (LOPRESSOR) 50 MG tablet Take 1 tablet (50 mg total) by mouth 2 (two) times daily.    spironolactone (ALDACTONE) 25 MG tablet Take 25 mg by mouth once daily.            While in the hospital, will manage blood pressure as follows; Continue home antihypertensive regimen    Will utilize p.r.n. blood pressure medication only if patient's blood pressure greater than 180/110 and she develops symptoms such as worsening chest pain or shortness of breath.    Chronic venous  stasis dermatitis of both lower extremities  Chronic problem for two months.    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 Dementia is moderate. The patient does not have signs of behavioral disturbance. Home dementia medications are Held or Continued: patient does not take any medications specific to dementia . Continue non-pharmacologic interventions to prevent delirium (No VS between 11PM-5AM, activity during day, opening blinds, providing glasses/hearing aids, and up in chair during daytime). Will avoid narcotics and benzos unless absolutely necessary. PRN anti-psychotics are not prescribed to avoid self harm behaviors.      VTE Risk Mitigation (From admission, onward)           Ordered     enoxaparin injection 60 mg  Every 12 hours         04/22/24 1909     IP VTE HIGH RISK PATIENT  Once         04/22/24 1909     Place sequential compression device  Until discontinued         04/22/24 1909                               Pharmacist Renal Dose Adjustment Note    Ghazala Hagen is a 88 y.o. female being treated with the medication Lovenox    Patient Data:    Vital Signs (Most Recent):  Temp: 97.7 °F (36.5 °C) (04/22/24 1409)  Pulse: 77 (04/22/24 1806)  Resp: 18 (04/22/24 1806)  BP: 100/64 (04/22/24 1806)  SpO2: (!) 94 % (04/22/24 1806) Vital Signs (72h Range):  Temp:  [97.7 °F (36.5 °C)]   Pulse:  [73-79]   Resp:  [18]   BP: (100-118)/(55-77)   SpO2:  [94 %-96 %]      Recent Labs   Lab 04/22/24  1607   CREATININE 1.0     Serum creatinine: 1 mg/dL 04/22/24 1607  Estimated creatinine clearance: 57.1 mL/min    lovenox 40 mg daily will be changed to lovenox 60 mg Q12H  DUE TO PT'S CURRENT BMI > 50    Pharmacist's Name: Ekta Reddy  Pharmacist's Extension: [phone]      Patricia Avilez NP  Department of Hospital Medicine  [facility]/Surg

## 2024-04-23 NOTE — ASSESSMENT & PLAN NOTE
Atrial flutter seen on EKG. Patient not on anticoagulation at home.     -Continue metoprolol 50mg BID  -Telemetry monitoring

## 2024-04-23 NOTE — HPI
Ghazala Hagen is an 88 year old female with a previous medical history of HTN, Thyroid diease, dementia, and depression who was informed by PCP at [facility] to present to the emergency room for sepsis. Per blood work performed on Friday due blood pressure being in the 90's systolic she had lactic acid of 29, sodium of 128, and sed rate of 79. Her family brought her to [facility] initially to discuss options of nursing home placement. The patient currently resides in assisted living at [facility] and has a progressive need for more assistance with ADL's. Per daughter at bedside her mother sleeps most of the day and does not get of out bed and when she does she is only able to use her walker to go short distances in a room. The daughter also reports her mother has been increasingly confused with intermittent hallucinations. Currently the patient is also having bilateral lower extremity venous stasis wounds [facility] by [facility] by their palliative wound care team for the past two months. Review of systems performed with daughter of patient endorsed that patient had mild upper respiratory symptoms of cough and congestion. Initial ED evaluation shows a lactic of 2.3, sodium of 131, albumin of 2.7 and elevated liver enzymes. CBC shows no leukocytosis and urine studies negative for infection. Chest xray reads as left lower lobe pneumonia and patient was initiated on azithromycin and ceftriaxone in emergency room. EKG read atrial flutter and patient on metoprolol 50mg BID but no anticoagulation per med rec performed by family. Upon examination patient wakes to voice, obeys commands and is oriented to self and that she is in a hospital for an infection. Patient then endorses she just wants to rest. Patient to be admitted by hospital medicine for further evaluation and management.

## 2024-04-23 NOTE — NURSING
Nurses Note -- 4 Eyes      4/23/2024   3:13 AM      Skin assessed during: Admit      [] No Altered Skin Integrity Present    []Prevention Measures Documented      [x] Yes- Altered Skin Integrity Present or Discovered   [x] LDA Added if Not in Epic (Describe Wound)   [x] New Altered Skin Integrity was Present on Admit and Documented in LDA   [x] Wound Image Taken    Wound Care Consulted? Yes    Attending Nurse:  Lisa Delgado RN/Staff Member:   Serena SHEPARD

## 2024-04-23 NOTE — ASSESSMENT & PLAN NOTE
Nutrition consulted. Most recent weight and BMI monitored-     Measurements:  Wt Readings from Last 1 Encounters:   04/23/24 124.8 kg (275 lb 2.2 oz)   Body mass index is 48.74 kg/m².    Patient has been screened and assessed by RD.    Malnutrition Type:  Context: chronic illness  Level: moderate    Malnutrition Characteristic Summary:  Energy Intake (Malnutrition): less than 75% for greater than or equal to 1 month  Fluid Accumulation (Malnutrition): moderate    Interventions/Recommendations (treatment strategy):

## 2024-04-23 NOTE — PLAN OF CARE
[facility] - Med/Surg  Initial Discharge Assessment       Primary Care Provider: Lenore Hanson MD    Admission Diagnosis: Hyponatremia [E87.1]    Admission Date: 4/22/2024  Expected Discharge Date: 4/25/2024    Transition of Care Barriers: None    Payor: MEDICARE / Plan: MEDICARE PART A & B / Product Type: Government /     Extended Emergency Contact Information  Primary Emergency Contact: Sydney Duffy  Mobile Phone: [phone]  Relation: Grandchild  Preferred language: English   needed? No  Secondary Emergency Contact: Alayna Conrad  Mobile Phone: [phone]  Relation: Daughter  Preferred language: English   needed? No    Discharge Plan A: Skilled Nursing Facility  Discharge Plan B: New Nursing Home placement - intermediate care facility      [address] [place], [place] - [address] [place], [place] [address] [facility]  [address] [place] [address]  [place] [address]  Phone: [phone] Fax: [phone]    DC assessment completed. Pt lives at [facility]. Daughter at bedside. PCP verified. Pharm verified. Has palliative care with [facility]. Insurance verified. Daughter interested in SNF to intermediate. CM following for DC planning.     Initial Assessment (most recent)       Adult Discharge Assessment - 04/23/24 1524          Discharge Assessment    Assessment Type Discharge Planning Assessment     Confirmed/corrected address, phone number and insurance Yes     Confirmed Demographics Correct on Facesheet     Source of Information family     If unable to respond/provide information was family/caregiver contacted? Yes     Contact Name/Number daughter @ bedside     Communicated MAIRA with patient/caregiver Yes     People in Home facility resident     Facility Arrived From: [place]     Do you expect to return to your current living situation? Yes     Do you have help at home or someone to help you manage your care at home? No     Prior to hospitilization cognitive status: Unable to  Assess     Current cognitive status: Unable to Assess     Equipment Currently Used at Home rollator     Readmission within 30 days? No     Patient currently being followed by outpatient case management? No     Do you currently have service(s) that help you manage your care at home? Yes     Name and Contact number of agency palliative care with [facility]     Is the pt/caregiver preference to resume services with current agency Yes     Do you take prescription medications? Yes     Do you have prescription coverage? Yes     Coverage medicaid     Do you have any problems affording any of your prescribed medications? No     Is the patient taking medications as prescribed? yes     Who is going to help you get home at discharge? daughter     How do you get to doctors appointments? family or friend will provide     Are you on dialysis? No     Do you take coumadin? No     Discharge Plan A Skilled Nursing Facility     Discharge Plan B New Nursing Home placement - [facility] care facility     DME Needed Upon Discharge  none     Discharge Plan discussed with: Adult children     Transition of Care Barriers None

## 2024-04-23 NOTE — PROGRESS NOTES
[facility] Medicine  Progress Note    Patient Name: Ghazala Hagen  MRN: [id number]  Patient Class: IP- Inpatient   Admission Date: 4/22/2024  Length of Stay: 1 days  Attending Physician: Dorothy Ivy MD  Primary Care Provider: Lenore Hanson MD        Subjective:     Principal Problem:Pneumonia        HPI:  Ghazala Hagen is an 88 year old female with a previous medical history of HTN, Thyroid diease, dementia, and depression who was informed by PCP at [facility] to present to the emergency room for sepsis. Per blood work performed on Friday due blood pressure being in the 90's systolic she had lactic acid of 29, sodium of 128, and sed rate of 79. Her family brought her to [facility] initially to discuss options of nursing home placement. The patient currently resides in assisted living at [facility] and has a progressive need for more assistance with ADL's. Per daughter at bedside her mother sleeps most of the day and does not get of out bed and when she does she is only able to use her walker to go short distances in a room. The daughter also reports her mother has been increasingly confused with intermittent hallucinations. Currently the patient is also having bilateral lower extremity venous stasis wounds dressed by [facility] by their palliative wound care team for the past two months. Review of systems performed with daughter of patient endorsed that patient had mild upper respiratory symptoms of cough and congestion. Initial ED evaluation shows a lactic of 2.3, sodium of 131, albumin of 2.7 and elevated liver enzymes. CBC shows no leukocytosis and urine studies negative for infection. Chest xray reads as left lower lobe pneumonia and patient was initiated on azithromycin and ceftriaxone in emergency room. EKG read atrial flutter and patient on metoprolol 50mg BID but no anticoagulation per med rec performed by family. Upon examination patient wakes to voice,  obeys commands and is oriented to self and that she is in a hospital for an infection. Patient then endorses she just wants to rest. Patient to be admitted by hospital medicine for further evaluation and management.    Overview/Hospital Course:  No notes on file    Interval History:  Patient with exhausted look, answering questions with closed eyes.  Daughter present at bedside.  Confirms full code status.  Previously managed by palliative Medicine at assisted living facility.  Patient with bilateral chronic lower extremity venous stasis ulcers.  Denies any new focal subjective complaints.  Reports occasional cough but no swallowing dysfunction.    Review of Systems   Reason unable to perform ROS: ROS largely performed by daughter at bedside due to patient dementia.   Constitutional:  Positive for activity change and fatigue.   HENT:  Positive for congestion and postnasal drip.    Respiratory:  Positive for cough.         Non productive   Cardiovascular:  Positive for leg swelling.   Musculoskeletal:  Positive for gait problem.   Skin:  Positive for color change and wound.   Psychiatric/Behavioral:  Positive for confusion and hallucinations.         Per daughter patient having hallucinations and increased confusion     Objective:     Vital Signs (Most Recent):  Temp: 97.5 °F (36.4 °C) (04/23/24 0714)  Pulse: 92 (04/23/24 0714)  Resp: 16 (04/23/24 0714)  BP: 118/83 (04/23/24 0714)  SpO2: 95 % (04/23/24 0714) Vital Signs (24h Range):  Temp:  [97.2 °F (36.2 °C)-98.1 °F (36.7 °C)] 97.5 °F (36.4 °C)  Pulse:  [73-92] 92  Resp:  [16-19] 16  SpO2:  [94 %-98 %] 95 %  BP: (100-118)/(55-83) 118/83     Weight: 124.8 kg (275 lb 2.2 oz)  Body mass index is 48.74 kg/m².    Intake/Output Summary (Last 24 hours) at 4/23/2024 1014  Last data filed at 4/23/2024 0300  Gross per 24 hour   Intake 480 ml   Output 225 ml   Net 255 ml         Physical Exam  Vitals reviewed.   Constitutional:       Appearance: She is obese. She is  ill-appearing.      Comments: Chronically ill appearing   HENT:      Head: Normocephalic and atraumatic.      Mouth/Throat:      Mouth: Mucous membranes are dry.      Pharynx: Oropharynx is clear.   Eyes:      General:         Right eye: Discharge present.         Left eye: Discharge present.     Extraocular Movements: Extraocular movements intact.      Pupils: Pupils are equal, round, and reactive to light.   Cardiovascular:      Rate and Rhythm: Rhythm irregular.      Pulses: Normal pulses.   Pulmonary:      Effort: Pulmonary effort is normal.      Breath sounds: Examination of the right-middle field reveals decreased breath sounds. Examination of the left-middle field reveals decreased breath sounds. Examination of the right-lower field reveals decreased breath sounds. Examination of the left-lower field reveals decreased breath sounds. Decreased breath sounds present.   Abdominal:      General: Bowel sounds are normal. There is no distension.      Palpations: Abdomen is soft.      Tenderness: There is no abdominal tenderness.   Musculoskeletal:         General: Swelling and tenderness present.      Cervical back: Normal range of motion and neck supple.      Right lower leg: Tenderness present. 2+ Edema present.      Left lower leg: Tenderness present. 2+ Edema present.      Right ankle: Swelling present. Tenderness present.      Left ankle: Swelling present. Tenderness present.      Right foot: Swelling and tenderness present.      Left foot: Swelling and tenderness present.   Skin:     General: Skin is warm and dry.      Capillary Refill: Capillary refill takes less than 2 seconds.             Comments: Bilateral lower legs within above margins are red, swollen, crusting and painful to touch   Neurological:      Mental Status: She is lethargic and confused.      GCS: GCS eye subscore is 3. GCS verbal subscore is 4. GCS motor subscore is 6.      Comments: Wakes to voice and obeys commands. Oriented to self and  that she is in a hospital for an infection. Recognizes daughter at bedside             Significant Labs: All pertinent labs within the past 24 hours have been reviewed.  CBC:   Recent Labs   Lab 04/22/24  1607 04/23/24  0126   WBC 9.39 8.44   HGB 14.7 12.7   HCT 46.8 40.8    228     CMP:   Recent Labs   Lab 04/22/24  1607 04/23/24  0126   * 133*   K 4.9 3.8   CL 95 98   CO2 24 26   * 101   BUN 29* 22   CREATININE 1.0 0.8   CALCIUM 9.5 8.5*   PROT 7.5  --    ALBUMIN 2.8*  --    BILITOT 1.1*  --    ALKPHOS 177*  --    AST 50*  --    ALT 36  --    ANIONGAP 12 9     Microbiology Results (last 7 days)       Procedure Component Value Units Date/Time    Blood culture x two cultures. Draw prior to antibiotics. [[id number]] Collected: 04/22/24 1750    Order Status: Completed Specimen: Blood from Peripheral, Forearm, Left Updated: 04/23/24 0317     Blood Culture, Routine No Growth to date    Narrative:      Aerobic and anaerobic    Blood Culture #1 [[id number]] Collected: 04/22/24 1607    Order Status: Completed Specimen: Blood from Peripheral, Forearm, Left Updated: 04/23/24 0317     Blood Culture, Routine No Growth to date    Culture, Respiratory [[id number]]     Order Status: No result Specimen: Respiratory from Sputum     Influenza A & B by Molecular [[id number]] Collected: 04/22/24 1753    Order Status: Completed Specimen: Nasopharyngeal Swab Updated: 04/22/24 1823     Influenza A, Molecular Negative     Influenza B, Molecular Negative     Flu A & B Source Nasal swab    Blood culture x two cultures. Draw prior to antibiotics. [[id number]]     Order Status: Canceled Specimen: Blood from Peripheral, Forearm, Left           Significant Imaging:   CXR:  New increase opacification left lateral lung base and that may be due to overlapping shadows or mild atelectasis. Suggest EPA and lateral views of the chest for further evaluation if the patient's condition permits.     Assessment/Plan:      *  Pneumonia  Chest xray shows possible pneumonia. Lactic Acid from 2.3-4.1    -Trend Lactic  -Additional fluid bolus  -continue intravenous Zosyn and vancomycin  -Monitor for signs/symptoms of deterioration  -cbc/cmp/mag/phos daily       ACP (advance care planning)  Advance Care Planning    Date: 04/23/2024    Living Will  During this visit, I engaged the patient  in the voluntary advance care planning process.  The patient and I reviewed the role for advance directives and their purpose in directing future healthcare if the patient's unable to speak for herself.  At this point in time, the patient does have full decision-making capacity.  We discussed different extreme health states that she could experience, and reviewed what kind of medical care she would want in those situations.  The patient communicated that if she were comatose and had little chance of a meaningful recovery, she would want machines/life-sustaining treatments used.  Patient's daughter was present during interview.  I spent a total of 16 minutes engaging the patient in this advance care planning discussion.               Malnutrition of moderate degree  Most recent weight and BMI monitored-     Measurements:  Wt Readings from Last 1 Encounters:   04/23/24 124.8 kg (275 lb 2.2 oz)   Body mass index is 48.74 kg/m².    Albumin 2.7    -Inpatient consult to nutrition         Atrial flutter  Atrial flutter seen on EKG. Patient not on anticoagulation at home.     -Continue metoprolol 50mg BID  -Telemetry monitoring    Primary hypertension  Chronic, controlled. Latest blood pressure and vitals reviewed-     Temp:  [97.2 °F (36.2 °C)-98.1 °F (36.7 °C)]   Pulse:  [68-92]   Resp:  [16-19]   BP: (100-118)/(58-83)   SpO2:  [94 %-98 %] .   Home meds for hypertension were reviewed and noted below.   Hypertension Medications               furosemide (LASIX) 20 MG tablet Take 1 tablet (20 mg total) by mouth once daily.    metoprolol tartrate (LOPRESSOR) 50 MG  tablet Take 1 tablet (50 mg total) by mouth 2 (two) times daily.    spironolactone (ALDACTONE) 25 MG tablet Take 25 mg by mouth once daily.            While in the hospital, will manage blood pressure as follows; Continue home antihypertensive regimen    Will utilize p.r.n. blood pressure medication only if patient's blood pressure greater than 180/110 and she develops symptoms such as worsening chest pain or shortness of breath.    Chronic venous stasis dermatitis of both lower extremities  Chronic problem for two months.    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 Dementia is moderate. The patient does not have signs of behavioral disturbance. Home dementia medications are Held or Continued: patient does not take any medications specific to dementia . Continue non-pharmacologic interventions to prevent delirium (No VS between 11PM-5AM, activity during day, opening blinds, providing glasses/hearing aids, and up in chair during daytime). Will avoid narcotics and benzos unless absolutely necessary. PRN anti-psychotics are not prescribed to avoid self harm behaviors.    Extensive discussion with patient's daughter and  regarding discharge planning including possibilities of skilled nursing facility placement versus hospice.  Will consult PT and OT for now.  VTE Risk Mitigation (From admission, onward)           Ordered     enoxaparin injection 40 mg  Every 12 hours         04/23/24 0751     IP VTE HIGH RISK PATIENT  Once         04/22/24 1909     Place sequential compression device  Until discontinued         04/22/24 1909                    Discharge Planning   MAIRA: 4/25/2024     Code Status: Full Code   Is the patient medically ready for discharge?:     Reason for patient still in hospital (select all that apply): Patient trending condition and Consult recommendations  Discharge Plan A: Skilled Nursing  Facility                  Dorothy Ivy MD  Department of Hospital Medicine   [facility]/Surg

## 2024-04-23 NOTE — NURSING
"Awake alert and oriented to person, place and year. States "October/November" when asked What month is it?. Daughter/son at bedside. Reviewed room/unit assignment. Questions asked and encouraged. 20g to lt forearm with site free of s/s of infiltration. VSS 79 HR,96% RA, 105/80.  "

## 2024-04-23 NOTE — ASSESSMENT & PLAN NOTE
Chronic, controlled. Latest blood pressure and vitals reviewed-     Temp:  [97.2 °F (36.2 °C)-98.1 °F (36.7 °C)]   Pulse:  [73-86]   Resp:  [17-18]   BP: (100-118)/(55-77)   SpO2:  [90 %-98 %] .   Home meds for hypertension were reviewed and noted below.   Hypertension Medications               furosemide (LASIX) 20 MG tablet Take 1 tablet (20 mg total) by mouth once daily.    metoprolol tartrate (LOPRESSOR) 50 MG tablet Take 1 tablet (50 mg total) by mouth 2 (two) times daily.    spironolactone (ALDACTONE) 25 MG tablet Take 25 mg by mouth once daily.            While in the hospital, will manage blood pressure as follows; Continue home antihypertensive regimen    Will utilize p.r.n. blood pressure medication only if patient's blood pressure greater than 180/110 and she develops symptoms such as worsening chest pain or shortness of breath.

## 2024-04-23 NOTE — ASSESSMENT & PLAN NOTE
Chest xray shows possible pneumonia. Lactic Acid from 2.3-4.1    -Trend Lactic  -Additional fluid bolus  -IV azithromycin  -IV ceftriaxone  -Procalcitonin pending  -Monitor for signs/symptoms of deterioration  -cbc/cmp/mag/phos daily

## 2024-04-23 NOTE — PT/OT/SLP EVAL
Physical Therapy Evaluation    Patient Name:  Ghazala Hagen   MRN:  [id number]    Recommendations:     Discharge Recommendations: Moderate Intensity Therapy   Discharge Equipment Recommendations: none   Barriers to discharge: Decreased caregiver support    Assessment:     Ghazala Hagen is a 88 y.o. female admitted with a medical diagnosis of Pneumonia.  She presents with the following impairments/functional limitations: weakness, impaired endurance, impaired functional mobility, gait instability, impaired balance, decreased lower extremity function, pain, impaired cognition, decreased ROM, impaired skin, edema, impaired cardiopulmonary response to activity .    Pt seen supine in bed with daughter present. Per daughter, pt resides at [facility] with noted increased difficulty with self care and decline in strength. Pt now requiring assist for all transfers, not eating, walking ~10ft with elbows on rollator. Pt with worsening lower legs venous stasis ulcers. Per daughter she is interested in pt attending SNF. Today, pt tolerated brief thera ex to LE's- limited ROM with pain. Pt c/o being cold inspite of having several blankets.  Pt requiring max to total assist mobility.  Pt to benefit from 24 hr supervision and mod intensity therapies..    Rehab Prognosis: Fair; patient would benefit from acute skilled PT services to address these deficits and reach maximum level of function.    Recent Surgery: * No surgery found *      Plan:     During this hospitalization, patient to be seen 6 x/week to address the identified rehab impairments via therapeutic activities, therapeutic exercises, neuromuscular re-education and progress toward the following goals:    Plan of Care Expires:  05/30/24    Subjective   Pt kept eyes closed most of time  Perked up when talking about casino. Daughter stated pt loves to go and eat crab legs- has not been there x 2 years  Chief Complaint: cold, leg pains  Patient/Family  Comments/goals: none  Pain/Comfort:  Pain Rating 1:  (not rated)  Location - Side 1: Bilateral  Location - Orientation 1: lower  Location 1: leg  Pain Addressed 1: Reposition, Distraction, Cessation of Activity, Nurse notified    Patients cultural, spiritual, Nondenominational conflicts given the current situation:      Living Environment:  [facility]  Prior to admission, patients level of function was aassist for all mobility.  Equipment used at home: rollator.  DME owned (not currently used): none.  Upon discharge, patient will have assistance from family.    Objective:     Communicated with nurse Beasley prior to session.  Patient found HOB elevated with telemetry, PureWick, peripheral IV  upon PT entry to room.    General Precautions: Standard, fall  Orthopedic Precautions:N/A   Braces: N/A  Respiratory Status: Room air    Exams:  Postural Exam:  Patient presented with the following abnormalities:    -       BMI 48.74  RLE ROM: Deficits: limited by pain 50%  RLE Strength: Deficits: 2/5  LLE ROM: Deficits: limited by pain 50%  LLE Strength: Deficits: 2/5    Functional Mobility:  Bed Mobility:     Scooting: maximal assistance and total assistance      AM-PAC 6 CLICK MOBILITY  Total Score:7       Treatment & Education:  Patient was educated on the importance of OOB activity and functional mobility to negate negative effects of prolonged bed rest during hospitalization, safe transfers and ambulation, and D/C planning   Thera ex to LE's to available range x 10 reps  Venous stasis ulcers with weeping L>R    Patient left HOB elevated with all lines intact, call button in reach, and daughter present.    GOALS:   Multidisciplinary Problems       Physical Therapy Goals          Problem: Physical Therapy    Goal Priority Disciplines Outcome Goal Variances Interventions   Physical Therapy Goal     PT, PT/OT Progressing     Description: Goals to be met by: 05-     Patient will increase functional  independence with mobility by performin. Supine to sit with Moderate Assistance  2. Sit to stand transfer with Moderate Assistance  3. Bed to chair transfer with Moderate Assistance using Rolling Walker  4. Gait  x 10 feet with Moderate Assistance using Rolling Walker.   5. Sitting at edge of bed x20 minutes with Moderate Assistance  6. Lower extremity exercise program x10-20 reps                       History:     Past Medical History:   Diagnosis Date    Hypertension     Thyroid disease        Past Surgical History:   Procedure Laterality Date    CHOLECYSTECTOMY      HIP SURGERY Left     s/p CHRIS complicated by joint infection/spacer    knee replacement Right     KNEE SURGERY Left     TKR    SHOULDER SURGERY Right     TONSILLECTOMY         Time Tracking:     PT Received On: 24  PT Start Time: 953     PT Stop Time: 1009  PT Total Time (min): 16 min     Billable Minutes: Evaluation 8 and Therapeutic Exercise 8      2024

## 2024-04-23 NOTE — PROGRESS NOTES
Pharmacist Renal Dose Adjustment Note    Ghazala Hagen is a 88 y.o. female being treated with the medication Lovenox    Patient Data:    Vital Signs (Most Recent):  Temp: 97.7 °F (36.5 °C) (04/22/24 1409)  Pulse: 77 (04/22/24 1806)  Resp: 18 (04/22/24 1806)  BP: 100/64 (04/22/24 1806)  SpO2: (!) 94 % (04/22/24 1806) Vital Signs (72h Range):  Temp:  [97.7 °F (36.5 °C)]   Pulse:  [73-79]   Resp:  [18]   BP: (100-118)/(55-77)   SpO2:  [94 %-96 %]      Recent Labs   Lab 04/22/24  1607   CREATININE 1.0     Serum creatinine: 1 mg/dL 04/22/24 1607  Estimated creatinine clearance: 57.1 mL/min    lovenox 40 mg daily will be changed to lovenox 60 mg Q12H  DUE TO PT'S CURRENT BMI > 50    Pharmacist's Name: Ekta Reddy  Pharmacist's Extension: [phone]

## 2024-04-23 NOTE — ASSESSMENT & PLAN NOTE
Most recent weight and BMI monitored-     Measurements:  Wt Readings from Last 1 Encounters:   04/23/24 124.8 kg (275 lb 2.2 oz)   Body mass index is 48.74 kg/m².    Albumin 2.7    -Inpatient consult to nutrition

## 2024-04-23 NOTE — ASSESSMENT & PLAN NOTE
Chest xray shows possible pneumonia. Lactic Acid from 2.3-4.1    -Trend Lactic  -Additional fluid bolus  -continue intravenous Zosyn and vancomycin  -Monitor for signs/symptoms of deterioration  -cbc/cmp/mag/phos daily

## 2024-04-23 NOTE — PLAN OF CARE
"Recommendations   Continue Cardiac diet but add chopped meats with gravy  Added Boost Plus Bid and Sky QD  Encourage intake during meal rounds  and monitor need for appetite stimulant  Ordered prealbumin and CRP levels  Collaborate with health care providers as needed     Goal: intake > 50% upon RD F/U visit  Nutrition Goal Status: new  Communication of RD Recs: reviewed with physician     Nutrition Related Social Determinants of Health: SDOH: Lack of adequate food.     Comments; Daughter reports that her mother lives at an assistant living facility but does not leave her room to eat in the dining room with other residents and nursing aids therefor she eats very little in her room.  Difficult to determine wt loss d/t fluid.  Denies that she has N/V but decreased appetite " for some time and increased confusion".  Last BM on 4/21. Daughter is considering nursing home placement.      Assessment and Plan  Nutrition Problem  Moderate Chronic Malnutrition     Related to (etiology):   Insufficient nutrient intake > 1 month; reduced ADL's; Dementia and Chronic diseases     Signs and Symptoms (as evidenced by):   Chronic wounds; intake < 50%; muscle mass loss; fluid accumulation      Interventions/Recommendations (treatment strategy):  Encourage intake; chop meats for ease in chewing; add Boost Plus BID and Sky QD; monitor need for appetite stimulant     Nutrition Diagnosis Status:   New     Malnutrition Assessment  Malnutrition Context: chronic illness  Malnutrition Level: moderate        Energy Intake (Malnutrition): less than 75% for greater than or equal to 1 month  Fluid Accumulation (Malnutrition): moderate       Anterior Thigh Region (Muscle Loss): mild depletion           "

## 2024-04-24 PROBLEM — L97.919 VENOUS ULCERS OF BOTH LOWER EXTREMITIES: Status: ACTIVE | Noted: 2024-04-24

## 2024-04-24 PROBLEM — L97.929 VENOUS ULCERS OF BOTH LOWER EXTREMITIES: Status: ACTIVE | Noted: 2024-04-24

## 2024-04-24 PROBLEM — I83.029 VENOUS ULCERS OF BOTH LOWER EXTREMITIES: Status: ACTIVE | Noted: 2024-04-24

## 2024-04-24 PROBLEM — I83.019 VENOUS ULCERS OF BOTH LOWER EXTREMITIES: Status: ACTIVE | Noted: 2024-04-24

## 2024-04-24 LAB
ANION GAP SERPL CALC-SCNC: 10 MMOL/L (ref 8–16)
BASOPHILS # BLD AUTO: 0.04 K/UL (ref 0–0.2)
BASOPHILS NFR BLD: 0.5 % (ref 0–1.9)
BUN SERPL-MCNC: 15 MG/DL (ref 8–23)
CALCIUM SERPL-MCNC: 8.5 MG/DL (ref 8.7–10.5)
CHLORIDE SERPL-SCNC: 99 MMOL/L (ref 95–110)
CO2 SERPL-SCNC: 23 MMOL/L (ref 23–29)
CREAT SERPL-MCNC: 0.8 MG/DL (ref 0.5–1.4)
CRP SERPL-MCNC: 75 MG/L (ref 0–8.2)
DIFFERENTIAL METHOD BLD: ABNORMAL
EOSINOPHIL # BLD AUTO: 0.3 K/UL (ref 0–0.5)
EOSINOPHIL NFR BLD: 3 % (ref 0–8)
ERYTHROCYTE [DISTWIDTH] IN BLOOD BY AUTOMATED COUNT: 19.4 % (ref 11.5–14.5)
EST. GFR  (NO RACE VARIABLE): >60 ML/MIN/1.73 M^2
GLUCOSE SERPL-MCNC: 89 MG/DL (ref 70–110)
HCT VFR BLD AUTO: 39.2 % (ref 37–48.5)
HGB BLD-MCNC: 12.3 G/DL (ref 12–16)
IMM GRANULOCYTES # BLD AUTO: 0.17 K/UL (ref 0–0.04)
IMM GRANULOCYTES NFR BLD AUTO: 2 % (ref 0–0.5)
LYMPHOCYTES # BLD AUTO: 1 K/UL (ref 1–4.8)
LYMPHOCYTES NFR BLD: 11.7 % (ref 18–48)
MAGNESIUM SERPL-MCNC: 1.6 MG/DL (ref 1.6–2.6)
MCH RBC QN AUTO: 25.9 PG (ref 27–31)
MCHC RBC AUTO-ENTMCNC: 31.4 G/DL (ref 32–36)
MCV RBC AUTO: 83 FL (ref 82–98)
MONOCYTES # BLD AUTO: 1 K/UL (ref 0.3–1)
MONOCYTES NFR BLD: 10.9 % (ref 4–15)
NEUTROPHILS # BLD AUTO: 6.3 K/UL (ref 1.8–7.7)
NEUTROPHILS NFR BLD: 71.9 % (ref 38–73)
NRBC BLD-RTO: 0 /100 WBC
PHOSPHATE SERPL-MCNC: 3.6 MG/DL (ref 2.7–4.5)
PLATELET # BLD AUTO: 174 K/UL (ref 150–450)
PMV BLD AUTO: 10 FL (ref 9.2–12.9)
POTASSIUM SERPL-SCNC: 3.6 MMOL/L (ref 3.5–5.1)
PREALB SERPL-MCNC: 10 MG/DL (ref 20–43)
RBC # BLD AUTO: 4.75 M/UL (ref 4–5.4)
SODIUM SERPL-SCNC: 132 MMOL/L (ref 136–145)
VANCOMYCIN SERPL-MCNC: 13.2 UG/ML
WBC # BLD AUTO: 8.7 K/UL (ref 3.9–12.7)

## 2024-04-24 PROCEDURE — 80202 ASSAY OF VANCOMYCIN: CPT | Performed by: INTERNAL MEDICINE

## 2024-04-24 PROCEDURE — 85025 COMPLETE CBC W/AUTO DIFF WBC: CPT

## 2024-04-24 PROCEDURE — 99900035 HC TECH TIME PER 15 MIN (STAT)

## 2024-04-24 PROCEDURE — 97110 THERAPEUTIC EXERCISES: CPT

## 2024-04-24 PROCEDURE — 84100 ASSAY OF PHOSPHORUS: CPT

## 2024-04-24 PROCEDURE — 94761 N-INVAS EAR/PLS OXIMETRY MLT: CPT

## 2024-04-24 PROCEDURE — 86140 C-REACTIVE PROTEIN: CPT | Performed by: INTERNAL MEDICINE

## 2024-04-24 PROCEDURE — 25000003 PHARM REV CODE 250

## 2024-04-24 PROCEDURE — 83735 ASSAY OF MAGNESIUM: CPT

## 2024-04-24 PROCEDURE — 25000003 PHARM REV CODE 250: Performed by: INTERNAL MEDICINE

## 2024-04-24 PROCEDURE — 63600175 PHARM REV CODE 636 W HCPCS: Performed by: INTERNAL MEDICINE

## 2024-04-24 PROCEDURE — 36415 COLL VENOUS BLD VENIPUNCTURE: CPT | Performed by: INTERNAL MEDICINE

## 2024-04-24 PROCEDURE — 84134 ASSAY OF PREALBUMIN: CPT | Performed by: INTERNAL MEDICINE

## 2024-04-24 PROCEDURE — 11000001 HC ACUTE MED/SURG PRIVATE ROOM

## 2024-04-24 PROCEDURE — 63600175 PHARM REV CODE 636 W HCPCS

## 2024-04-24 PROCEDURE — 97535 SELF CARE MNGMENT TRAINING: CPT

## 2024-04-24 PROCEDURE — 80048 BASIC METABOLIC PNL TOTAL CA: CPT

## 2024-04-24 RX ADMIN — ENOXAPARIN SODIUM 40 MG: 40 INJECTION SUBCUTANEOUS at 08:04

## 2024-04-24 RX ADMIN — OXYCODONE AND ACETAMINOPHEN 1 TABLET: 7.5; 325 TABLET ORAL at 12:04

## 2024-04-24 RX ADMIN — OXYCODONE AND ACETAMINOPHEN 1 TABLET: 7.5; 325 TABLET ORAL at 09:04

## 2024-04-24 RX ADMIN — DOCUSATE SODIUM AND SENNOSIDES 1 TABLET: 8.6; 5 TABLET, FILM COATED ORAL at 08:04

## 2024-04-24 RX ADMIN — METOPROLOL TARTRATE 50 MG: 50 TABLET, FILM COATED ORAL at 08:04

## 2024-04-24 RX ADMIN — PANTOPRAZOLE SODIUM 40 MG: 40 TABLET, DELAYED RELEASE ORAL at 08:04

## 2024-04-24 RX ADMIN — SODIUM CHLORIDE, POTASSIUM CHLORIDE, SODIUM LACTATE AND CALCIUM CHLORIDE: 600; 310; 30; 20 INJECTION, SOLUTION INTRAVENOUS at 03:04

## 2024-04-24 RX ADMIN — ESCITALOPRAM OXALATE 20 MG: 10 TABLET, FILM COATED ORAL at 08:04

## 2024-04-24 RX ADMIN — VANCOMYCIN HYDROCHLORIDE 1250 MG: 1.25 INJECTION, POWDER, LYOPHILIZED, FOR SOLUTION INTRAVENOUS at 12:04

## 2024-04-24 RX ADMIN — LEVOTHYROXINE SODIUM 200 MCG: 0.1 TABLET ORAL at 06:04

## 2024-04-24 RX ADMIN — PIPERACILLIN AND TAZOBACTAM 4.5 G: 4; .5 INJECTION, POWDER, LYOPHILIZED, FOR SOLUTION INTRAVENOUS; PARENTERAL at 08:04

## 2024-04-24 RX ADMIN — PIPERACILLIN AND TAZOBACTAM 4.5 G: 4; .5 INJECTION, POWDER, LYOPHILIZED, FOR SOLUTION INTRAVENOUS; PARENTERAL at 04:04

## 2024-04-24 RX ADMIN — FUROSEMIDE 20 MG: 20 TABLET ORAL at 08:04

## 2024-04-24 RX ADMIN — SPIRONOLACTONE 25 MG: 25 TABLET ORAL at 08:04

## 2024-04-24 NOTE — SUBJECTIVE & OBJECTIVE
Interval History:  Patient is more awake and responsive today.  Patient is feeling better and had a restful night.  Patient's daughter is present at bedside. Patient with bilateral chronic lower extremity venous stasis ulcers.  Denies any new focal subjective complaints.  Reports occasional cough but no swallowing dysfunction.  Patient's buttocks have deep tissue injury prior of arrival which is being managed by wound care nurse.    Review of Systems   Reason unable to perform ROS: ROS largely performed by daughter at bedside due to patient dementia.   Constitutional:  Positive for activity change and fatigue.   HENT:  Positive for congestion and postnasal drip.    Respiratory:  Positive for cough.         Non productive   Cardiovascular:  Positive for leg swelling.   Musculoskeletal:  Positive for gait problem.   Skin:  Positive for color change and wound.   Psychiatric/Behavioral:  Positive for confusion and hallucinations.         Per daughter patient having hallucinations and increased confusion     Objective:     Vital Signs (Most Recent):  Temp: 98 °F (36.7 °C) (04/24/24 0810)  Pulse: 75 (04/24/24 0810)  Resp: 20 (04/24/24 0810)  BP: (!) 121/59 (04/24/24 0328)  SpO2: 95 % (04/24/24 0810) Vital Signs (24h Range):  Temp:  [97.1 °F (36.2 °C)-99.7 °F (37.6 °C)] 98 °F (36.7 °C)  Pulse:  [] 75  Resp:  [15-20] 20  SpO2:  [93 %-96 %] 95 %  BP: (103-124)/(52-68) 121/59     Weight: 124.8 kg (275 lb 2.2 oz)  Body mass index is 48.74 kg/m².    Intake/Output Summary (Last 24 hours) at 4/24/2024 0820  Last data filed at 4/24/2024 0336  Gross per 24 hour   Intake 240 ml   Output 1225 ml   Net -985 ml         Physical Exam  Vitals reviewed.   Constitutional:       Appearance: She is obese. She is ill-appearing.      Comments: Chronically ill appearing   HENT:      Head: Normocephalic and atraumatic.      Mouth/Throat:      Mouth: Mucous membranes are dry.      Pharynx: Oropharynx is clear.   Eyes:      General:          Right eye: Discharge present.         Left eye: Discharge present.     Extraocular Movements: Extraocular movements intact.      Pupils: Pupils are equal, round, and reactive to light.   Cardiovascular:      Rate and Rhythm: Rhythm irregular.      Pulses: Normal pulses.   Pulmonary:      Effort: Pulmonary effort is normal.      Breath sounds: Examination of the right-middle field reveals decreased breath sounds. Examination of the left-middle field reveals decreased breath sounds. Examination of the right-lower field reveals decreased breath sounds. Examination of the left-lower field reveals decreased breath sounds. Decreased breath sounds present.   Abdominal:      General: Bowel sounds are normal. There is no distension.      Palpations: Abdomen is soft.      Tenderness: There is no abdominal tenderness.   Musculoskeletal:         General: Swelling and tenderness present.      Cervical back: Normal range of motion and neck supple.      Right lower leg: Tenderness present. 2+ Edema present.      Left lower leg: Tenderness present. 2+ Edema present.      Right ankle: Swelling present. Tenderness present.      Left ankle: Swelling present. Tenderness present.      Right foot: Swelling and tenderness present.      Left foot: Swelling and tenderness present.   Skin:     General: Skin is warm and dry.      Capillary Refill: Capillary refill takes less than 2 seconds.             Comments: Bilateral lower legs within above margins are red, swollen, crusting and painful to touch   Neurological:      Mental Status: She is lethargic and confused.      GCS: GCS eye subscore is 3. GCS verbal subscore is 4. GCS motor subscore is 6.      Comments: Wakes to voice and obeys commands. Oriented to self and that she is in a hospital for an infection. Recognizes daughter at bedside             Significant Labs: All pertinent labs within the past 24 hours have been reviewed.  CBC:   Recent Labs   Lab 04/22/24  1607 04/23/24  0126  04/24/24  0438   WBC 9.39 8.44 8.70   HGB 14.7 12.7 12.3   HCT 46.8 40.8 39.2    228 174     CMP:   Recent Labs   Lab 04/22/24  1607 04/23/24  0126 04/24/24 0438   * 133* 132*   K 4.9 3.8 3.6   CL 95 98 99   CO2 24 26 23   * 101 89   BUN 29* 22 15   CREATININE 1.0 0.8 0.8   CALCIUM 9.5 8.5* 8.5*   PROT 7.5  --   --    ALBUMIN 2.8*  --   --    BILITOT 1.1*  --   --    ALKPHOS 177*  --   --    AST 50*  --   --    ALT 36  --   --    ANIONGAP 12 9 10     Microbiology Results (last 7 days)       Procedure Component Value Units Date/Time    Blood Culture #1 [[id number]] Collected: 04/22/24 1607    Order Status: Completed Specimen: Blood from Peripheral, Forearm, Left Updated: 04/23/24 2032     Blood Culture, Routine No Growth to date      No Growth to date    Blood culture x two cultures. Draw prior to antibiotics. [[id number]] Collected: 04/22/24 1750    Order Status: Completed Specimen: Blood from Peripheral, Forearm, Left Updated: 04/23/24 2032     Blood Culture, Routine No Growth to date      No Growth to date    Narrative:      Aerobic and anaerobic    Culture, Respiratory [[id number]]     Order Status: No result Specimen: Respiratory from Sputum     Influenza A & B by Molecular [[id number]] Collected: 04/22/24 1753    Order Status: Completed Specimen: Nasopharyngeal Swab Updated: 04/22/24 1823     Influenza A, Molecular Negative     Influenza B, Molecular Negative     Flu A & B Source Nasal swab    Blood culture x two cultures. Draw prior to antibiotics. [[id number]]     Order Status: Canceled Specimen: Blood from Peripheral, Forearm, Left           Significant Imaging:   CXR:  New increase opacification left lateral lung base and that may be due to overlapping shadows or mild atelectasis. Suggest EPA and lateral views of the chest for further evaluation if the patient's condition permits.    CT Head:  No acute intracranial finding.  Mild cerebral involutional change.  Advanced white matter  disease, unchanged.

## 2024-04-24 NOTE — PLAN OF CARE
Problem: Physical Therapy  Goal: Physical Therapy Goal  Description: Goals to be met by: 2024     Patient will increase functional independence with mobility by performin. Supine to sit with Moderate Assistance  2. Sit to stand transfer with Moderate Assistance  3. Bed to chair transfer with Moderate Assistance using Rolling Walker  4. Gait  x 10 feet with Moderate Assistance using Rolling Walker.   5. Sitting at edge of bed x20 minutes with Moderate Assistance  6. Lower extremity exercise program x10-20 reps  Outcome: Progressing   Pt awake/alert and participating with LE's thera ex. Mod intensity

## 2024-04-24 NOTE — CONSULTS
Bathed patient from neck to toes with hibiclens soap and water and assistance of 3 staff members. Cleaned all the dead dried skin off of the patient and once areas were cleaned applied thin layer of Triad to all areas. Bilateral buttocks, bilateral posterior upper thighs, and left heel noted with deep purple skin injury DTI. There were open wounds to stage 2 to bilateral posterior thighs. Bilateral lower legs with multiple open sores from venous stasis ulcers anteriorly and posteriorly. Applied Triad to bilateral lower legs and covered with abd pads and secured with kerlix. Patient was medicated with oral analgesics by her nurse 1 hour prior to wound skin assessment and patient did tolerate care with minimal pain. Wound care will continue to follow this patient throughout hospital stay.

## 2024-04-24 NOTE — PLAN OF CARE
F/u with [facility], spoke to Yocasta, pt accepted, but no bed until Friday, 4/26.  Pt not medically cleared, plan dc to SNF, Fri.    142 and PASRR in [facility]     04/24/24 1401   Post-Acute Status   Post-Acute Authorization Placement   Post-Acute Placement Status Pending medical clearance/testing   Discharge Plan   Discharge Plan A Skilled Nursing Facility   Discharge Plan B Skilled Nursing Facility

## 2024-04-24 NOTE — PROGRESS NOTES
Pharmacokinetic Assessment Follow Up: IV Vancomycin    Vancomycin serum concentration assessment(s):    The random level was drawn correctly and can be used to guide therapy at this time. The measurement is below the desired definitive target range of 15 to 20 mcg/mL.    Vancomycin Regimen Plan:  Re-dose with vancomycin 1250 mg IV once   Re-dose when the random level is less than 20 mcg/mL, next level to be drawn 4/25 with AM labs    Drug levels (last 3 results):  Recent Labs   Lab Result Units 04/24/24  0949   Vancomycin, Random ug/mL 13.2       Pharmacy will continue to follow and monitor vancomycin.    Please contact pharmacy at extension [phone] for questions regarding this assessment.    Thank you for the consult,   Raciel Ramsay       Patient brief summary:  Ghazala Hagen is a 88 y.o. female initiated on antimicrobial therapy with IV Vancomycin for treatment of lower respiratory infection    The patient's current regimen is pulse dosing    Drug Allergies:   Review of patient's allergies indicates:  No Known Allergies    Actual Body Weight:   124.8 kg    Renal Function:   Estimated Creatinine Clearance: 63.7 mL/min (based on SCr of 0.8 mg/dL).,     Dialysis Method (if applicable):  N/A    CBC (last 72 hours):  Recent Labs   Lab Result Units 04/22/24  1607 04/23/24  0126 04/24/24  0438   WBC K/uL 9.39 8.44 8.70   Hemoglobin g/dL 14.7 12.7 12.3   Hematocrit % 46.8 40.8 39.2   Platelets K/uL 240 228 174   Gran % % 70.0 68.4 71.9   Lymph % % 18.1 16.7* 11.7*   Mono % % 7.1 9.6 10.9   Eosinophil % % 2.0 2.7 3.0   Basophil % % 0.7 0.5 0.5   Differential Method  Automated Automated Automated       Metabolic Panel (last 72 hours):  Recent Labs   Lab Result Units 04/22/24  1607 04/22/24  1719 04/23/24  0126 04/24/24  0438   Sodium mmol/L 131*  --  133* 132*   Potassium mmol/L 4.9  --  3.8 3.6   Chloride mmol/L 95  --  98 99   CO2 mmol/L 24  --  26 23   Glucose mg/dL 122*  --  101 89   Glucose, UA   --  Negative  --   --     BUN mg/dL 29*  --  22 15   Creatinine mg/dL 1.0  --  0.8 0.8   Albumin g/dL 2.8*  --   --   --    Total Bilirubin mg/dL 1.1*  --   --   --    Alkaline Phosphatase U/L 177*  --   --   --    AST U/L 50*  --   --   --    ALT U/L 36  --   --   --    Magnesium mg/dL  --   --  1.6 1.6   Phosphorus mg/dL  --   --  3.9 3.6       Vancomycin Administrations:  vancomycin given in the last 96 hours                     vancomycin (VANCOCIN) 2,000 mg in dextrose 5 % (D5W) 500 mL IVPB (mg) 2,000 mg New Bag 04/23/24 1656                    Microbiologic Results:  Microbiology Results (last 7 days)       Procedure Component Value Units Date/Time    Blood Culture #1 [[id number]] Collected: 04/22/24 1607    Order Status: Completed Specimen: Blood from Peripheral, Forearm, Left Updated: 04/23/24 2032     Blood Culture, Routine No Growth to date      No Growth to date    Blood culture x two cultures. Draw prior to antibiotics. [[id number]] Collected: 04/22/24 1750    Order Status: Completed Specimen: Blood from Peripheral, Forearm, Left Updated: 04/23/24 2032     Blood Culture, Routine No Growth to date      No Growth to date    Narrative:      Aerobic and anaerobic    Culture, Respiratory [[id number]]     Order Status: No result Specimen: Respiratory from Sputum     Influenza A & B by Molecular [[id number]] Collected: 04/22/24 1753    Order Status: Completed Specimen: Nasopharyngeal Swab Updated: 04/22/24 1823     Influenza A, Molecular Negative     Influenza B, Molecular Negative     Flu A & B Source Nasal swab    Blood culture x two cultures. Draw prior to antibiotics. [[id number]]     Order Status: Canceled Specimen: Blood from Peripheral, Forearm, Left

## 2024-04-24 NOTE — PROGRESS NOTES
[facility] Medicine  Progress Note    Patient Name: Ghazala Hagen  MRN: [id number]  Patient Class: IP- Inpatient   Admission Date: 4/22/2024  Length of Stay: 2 days  Attending Physician: Dorothy Ivy MD  Primary Care Provider: Lenore Hanson MD        Subjective:     Principal Problem:Pneumonia        HPI:  Ghazala Hagen is an 88 year old female with a previous medical history of HTN, Thyroid diease, dementia, and depression who was informed by PCP at [facility] to present to the emergency room for sepsis. Per blood work performed on Friday due blood pressure being in the 90's systolic she had lactic acid of 29, sodium of 128, and sed rate of 79. Her family brought her to [facility] initially to discuss options of nursing home placement. The patient currently resides in assisted living at [facility] and has a progressive need for more assistance with ADL's. Per daughter at bedside her mother sleeps most of the day and does not get of out bed and when she does she is only able to use her walker to go short distances in a room. The daughter also reports her mother has been increasingly confused with intermittent hallucinations. Currently the patient is also having bilateral lower extremity venous stasis wounds dressed by [facility]"" by their palliative wound care team for the past two months. Review of systems performed with daughter of patient endorsed that patient had mild upper respiratory symptoms of cough and congestion. Initial ED evaluation shows a lactic of 2.3, sodium of 131, albumin of 2.7 and elevated liver enzymes. CBC shows no leukocytosis and urine studies negative for infection. Chest xray reads as left lower lobe pneumonia and patient was initiated on azithromycin and ceftriaxone in emergency room. EKG read atrial flutter and patient on metoprolol 50mg BID but no anticoagulation per med rec performed by family. Upon examination patient wakes to voice,  obeys commands and is oriented to self and that she is in a hospital for an infection. Patient then endorses she just wants to rest. Patient to be admitted by hospital medicine for further evaluation and management.    Overview/Hospital Course:  No notes on file    Interval History:  Patient is more awake and responsive today.  Patient is feeling better and had a restful night.  Patient's daughter is present at bedside. Patient with bilateral chronic lower extremity venous stasis ulcers.  Denies any new focal subjective complaints.  Reports occasional cough but no swallowing dysfunction.  Patient's buttocks have deep tissue injury prior of arrival which is being managed by wound care nurse.    Review of Systems   Reason unable to perform ROS: ROS largely performed by daughter at bedside due to patient dementia.   Constitutional:  Positive for activity change and fatigue.   HENT:  Positive for congestion and postnasal drip.    Respiratory:  Positive for cough.         Non productive   Cardiovascular:  Positive for leg swelling.   Musculoskeletal:  Positive for gait problem.   Skin:  Positive for color change and wound.   Psychiatric/Behavioral:  Positive for confusion and hallucinations.         Per daughter patient having hallucinations and increased confusion     Objective:     Vital Signs (Most Recent):  Temp: 98 °F (36.7 °C) (04/24/24 0810)  Pulse: 75 (04/24/24 0810)  Resp: 20 (04/24/24 0810)  BP: (!) 121/59 (04/24/24 0328)  SpO2: 95 % (04/24/24 0810) Vital Signs (24h Range):  Temp:  [97.1 °F (36.2 °C)-99.7 °F (37.6 °C)] 98 °F (36.7 °C)  Pulse:  [] 75  Resp:  [15-20] 20  SpO2:  [93 %-96 %] 95 %  BP: (103-124)/(52-68) 121/59     Weight: 124.8 kg (275 lb 2.2 oz)  Body mass index is 48.74 kg/m².    Intake/Output Summary (Last 24 hours) at 4/24/2024 0829  Last data filed at 4/24/2024 0338  Gross per 24 hour   Intake 240 ml   Output 1225 ml   Net -985 ml         Physical Exam  Vitals reviewed.   Constitutional:        Appearance: She is obese. She is ill-appearing.      Comments: Chronically ill appearing   HENT:      Head: Normocephalic and atraumatic.      Mouth/Throat:      Mouth: Mucous membranes are dry.      Pharynx: Oropharynx is clear.   Eyes:      General:         Right eye: Discharge present.         Left eye: Discharge present.     Extraocular Movements: Extraocular movements intact.      Pupils: Pupils are equal, round, and reactive to light.   Cardiovascular:      Rate and Rhythm: Rhythm irregular.      Pulses: Normal pulses.   Pulmonary:      Effort: Pulmonary effort is normal.      Breath sounds: Examination of the right-middle field reveals decreased breath sounds. Examination of the left-middle field reveals decreased breath sounds. Examination of the right-lower field reveals decreased breath sounds. Examination of the left-lower field reveals decreased breath sounds. Decreased breath sounds present.   Abdominal:      General: Bowel sounds are normal. There is no distension.      Palpations: Abdomen is soft.      Tenderness: There is no abdominal tenderness.   Musculoskeletal:         General: Swelling and tenderness present.      Cervical back: Normal range of motion and neck supple.      Right lower leg: Tenderness present. 2+ Edema present.      Left lower leg: Tenderness present. 2+ Edema present.      Right ankle: Swelling present. Tenderness present.      Left ankle: Swelling present. Tenderness present.      Right foot: Swelling and tenderness present.      Left foot: Swelling and tenderness present.   Skin:     General: Skin is warm and dry.      Capillary Refill: Capillary refill takes less than 2 seconds.             Comments: Bilateral lower legs within above margins are red, swollen, crusting and painful to touch   Neurological:      Mental Status: She is lethargic and confused.      GCS: GCS eye subscore is 3. GCS verbal subscore is 4. GCS motor subscore is 6.      Comments: Wakes to voice and  obeys commands. Oriented to self and that she is in a hospital for an infection. Recognizes daughter at bedside             Significant Labs: All pertinent labs within the past 24 hours have been reviewed.  CBC:   Recent Labs   Lab 04/22/24  1607 04/23/24  0126 04/24/24  0438   WBC 9.39 8.44 8.70   HGB 14.7 12.7 12.3   HCT 46.8 40.8 39.2    228 174     CMP:   Recent Labs   Lab 04/22/24  1607 04/23/24  0126 04/24/24  0438   * 133* 132*   K 4.9 3.8 3.6   CL 95 98 99   CO2 24 26 23   * 101 89   BUN 29* 22 15   CREATININE 1.0 0.8 0.8   CALCIUM 9.5 8.5* 8.5*   PROT 7.5  --   --    ALBUMIN 2.8*  --   --    BILITOT 1.1*  --   --    ALKPHOS 177*  --   --    AST 50*  --   --    ALT 36  --   --    ANIONGAP 12 9 10     Microbiology Results (last 7 days)       Procedure Component Value Units Date/Time    Blood Culture #1 [[id number]] Collected: 04/22/24 1607    Order Status: Completed Specimen: Blood from Peripheral, Forearm, Left Updated: 04/23/24 2032     Blood Culture, Routine No Growth to date      No Growth to date    Blood culture x two cultures. Draw prior to antibiotics. [[id number]] Collected: 04/22/24 1750    Order Status: Completed Specimen: Blood from Peripheral, Forearm, Left Updated: 04/23/24 2032     Blood Culture, Routine No Growth to date      No Growth to date    Narrative:      Aerobic and anaerobic    Culture, Respiratory [[id number]]     Order Status: No result Specimen: Respiratory from Sputum     Influenza A & B by Molecular [[id number]] Collected: 04/22/24 1753    Order Status: Completed Specimen: Nasopharyngeal Swab Updated: 04/22/24 1823     Influenza A, Molecular Negative     Influenza B, Molecular Negative     Flu A & B Source Nasal swab    Blood culture x two cultures. Draw prior to antibiotics. [[id number]]     Order Status: Canceled Specimen: Blood from Peripheral, Forearm, Left           Significant Imaging:   CXR:  New increase opacification left lateral lung base and that  may be due to overlapping shadows or mild atelectasis. Suggest EPA and lateral views of the chest for further evaluation if the patient's condition permits.    CT Head:  No acute intracranial finding.  Mild cerebral involutional change.  Advanced white matter disease, unchanged.    Assessment/Plan:      * Pneumonia  Chest xray shows possible pneumonia. Lactic Acid from 2.3-4.1    -Trend Lactic  -Additional fluid bolus  -continue intravenous Zosyn and vancomycin  -Monitor for signs/symptoms of deterioration  -cbc/cmp/mag/phos daily       Venous ulcers of both lower extremities  Wound care nurse following and also managing deep tissue injury of buttocks as well.      ACP (advance care planning)  Advance Care Planning    Date: 04/23/2024    Living Will  During this visit, I engaged the patient  in the voluntary advance care planning process.  The patient and I reviewed the role for advance directives and their purpose in directing future healthcare if the patient's unable to speak for herself.  At this point in time, the patient does have full decision-making capacity.  We discussed different extreme health states that she could experience, and reviewed what kind of medical care she would want in those situations.  The patient communicated that if she were comatose and had little chance of a meaningful recovery, she would want machines/life-sustaining treatments used.  Patient's daughter was present during interview.  I spent a total of 16 minutes engaging the patient in this advance care planning discussion.               Moderate protein-calorie malnutrition  Most recent weight and BMI monitored-     Measurements:  Wt Readings from Last 1 Encounters:   04/23/24 124.8 kg (275 lb 2.2 oz)   Body mass index is 48.74 kg/m².    Albumin 2.7    -Inpatient consult to nutrition         Atrial flutter  Atrial flutter seen on EKG. Patient not on anticoagulation at home.     -Continue metoprolol 50mg BID  -Telemetry  monitoring    Primary hypertension  Chronic, controlled. Latest blood pressure and vitals reviewed-     Temp:  [97.2 °F (36.2 °C)-98.1 °F (36.7 °C)]   Pulse:  [68-92]   Resp:  [16-19]   BP: (100-118)/(58-83)   SpO2:  [94 %-98 %] .   Home meds for hypertension were reviewed and noted below.   Hypertension Medications               furosemide (LASIX) 20 MG tablet Take 1 tablet (20 mg total) by mouth once daily.    metoprolol tartrate (LOPRESSOR) 50 MG tablet Take 1 tablet (50 mg total) by mouth 2 (two) times daily.    spironolactone (ALDACTONE) 25 MG tablet Take 25 mg by mouth once daily.            While in the hospital, will manage blood pressure as follows; Continue home antihypertensive regimen    Will utilize p.r.n. blood pressure medication only if patient's blood pressure greater than 180/110 and she develops symptoms such as worsening chest pain or shortness of breath.    Chronic venous stasis dermatitis of both lower extremities  Chronic problem for two months.    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 Dementia is moderate. The patient does not have signs of behavioral disturbance. Home dementia medications are Held or Continued: patient does not take any medications specific to dementia . Continue non-pharmacologic interventions to prevent delirium (No VS between 11PM-5AM, activity during day, opening blinds, providing glasses/hearing aids, and up in chair during daytime). Will avoid narcotics and benzos unless absolutely necessary. PRN anti-psychotics are not prescribed to avoid self harm behaviors.    Discussed with patient's daughter and , answered all questions.  Awaiting skilled nursing facility placement.  VTE Risk Mitigation (From admission, onward)           Ordered     enoxaparin injection 40 mg  Every 12 hours         04/23/24 0751     IP VTE HIGH RISK PATIENT  Once         04/22/24  1909     Place sequential compression device  Until discontinued         04/22/24 1909                    Discharge Planning   MAIRA: 4/26/2024     Code Status: Full Code   Is the patient medically ready for discharge?:     Reason for patient still in hospital (select all that apply): Patient trending condition and Consult recommendations  Discharge Plan A: Skilled Nursing Facility                  Dorothy Ivy MD  Department of Hospital Medicine   [facility]/Surg

## 2024-04-24 NOTE — PLAN OF CARE
Problem: Adult Inpatient Plan of Care  Goal: Plan of Care Review  Outcome: Progressing  Goal: Readiness for Transition of Care  Outcome: Progressing     Problem: Bariatric Environmental Safety  Goal: Safety Maintained with Care  Outcome: Progressing     Problem: Fluid Imbalance (Pneumonia)  Goal: Fluid Balance  Outcome: Progressing     Problem: Infection (Pneumonia)  Goal: Resolution of Infection Signs and Symptoms  Outcome: Progressing     Problem: Fall Injury Risk  Goal: Absence of Fall and Fall-Related Injury  Outcome: Progressing     Problem: Impaired Wound Healing  Goal: Optimal Wound Healing  Outcome: Progressing     Problem: Skin Injury Risk Increased  Goal: Skin Health and Integrity  Outcome: Progressing

## 2024-04-24 NOTE — PROGRESS NOTES
Formerly [facility], [facility] Medicine  Progress Note    Patient Name: Ghazala Hagen  MRN: [id number]  Patient Class: IP- Inpatient   Admission Date: 4/22/2024  Length of Stay: 2 days  Attending Physician: Dorothy Ivy MD  Primary Care Provider: Lenore Hanson MD        Subjective:     Principal Problem:Pneumonia        HPI:  Ghazala Hagen is an 88 year old female with a previous medical history of HTN, Thyroid diease, dementia, and depression who was informed by PCP at [facility] to present to the emergency room for sepsis. Per blood work performed on Friday due blood pressure being in the 90's systolic she had lactic acid of 29, sodium of 128, and sed rate of 79. Her family brought her to [facility] initially to discuss options of nursing home placement. The patient currently resides in assisted living at [facility] and has a progressive need for more assistance with ADL's. Per daughter at bedside her mother sleeps most of the day and does not get of out bed and when she does she is only able to use her walker to go short distances in a room. The daughter also reports her mother has been increasingly confused with intermittent hallucinations. Currently the patient is also having bilateral lower extremity venous stasis wounds dressed by [facility] by their palliative wound care team for the past two months. Review of systems performed with daughter of patient endorsed that patient had mild upper respiratory symptoms of cough and congestion. Initial ED evaluation shows a lactic of 2.3, sodium of 131, albumin of 2.7 and elevated liver enzymes. CBC shows no leukocytosis and urine studies negative for infection. Chest xray reads as left lower lobe pneumonia and patient was initiated on azithromycin and ceftriaxone in emergency room. EKG read atrial flutter and patient on metoprolol 50mg BID but no anticoagulation per med rec performed by family. Upon examination patient wakes to voice,  obeys commands and is oriented to self and that she is in a hospital for an infection. Patient then endorses she just wants to rest. Patient to be admitted by hospital medicine for further evaluation and management.    Overview/Hospital Course:  No notes on file    Interval History:  Patient with exhausted look, answering questions with closed eyes.  Daughter present at bedside.  Confirms full code status.  Previously managed by palliative Medicine at assisted living facility.  Patient with bilateral chronic lower extremity venous stasis ulcers.  Denies any new focal subjective complaints.  Reports occasional cough but no swallowing dysfunction.    Review of Systems   Reason unable to perform ROS: ROS largely performed by daughter at bedside due to patient dementia.   Constitutional:  Positive for activity change and fatigue.   HENT:  Positive for congestion and postnasal drip.    Respiratory:  Positive for cough.         Non productive   Cardiovascular:  Positive for leg swelling.   Musculoskeletal:  Positive for gait problem.   Skin:  Positive for color change and wound.   Psychiatric/Behavioral:  Positive for confusion and hallucinations.         Per daughter patient having hallucinations and increased confusion     Objective:     Vital Signs (Most Recent):  Temp: 98 °F (36.7 °C) (04/24/24 0810)  Pulse: 75 (04/24/24 0810)  Resp: 20 (04/24/24 0810)  BP: (!) 121/59 (04/24/24 0328)  SpO2: 95 % (04/24/24 0810) Vital Signs (24h Range):  Temp:  [97.1 °F (36.2 °C)-99.7 °F (37.6 °C)] 98 °F (36.7 °C)  Pulse:  [] 75  Resp:  [15-20] 20  SpO2:  [93 %-96 %] 95 %  BP: (103-124)/(52-68) 121/59     Weight: 124.8 kg (275 lb 2.2 oz)  Body mass index is 48.74 kg/m².    Intake/Output Summary (Last 24 hours) at 4/24/2024 0870  Last data filed at 4/24/2024 0330  Gross per 24 hour   Intake 240 ml   Output 1225 ml   Net -985 ml         Physical Exam  Vitals reviewed.   Constitutional:       Appearance: She is obese. She is  ill-appearing.      Comments: Chronically ill appearing   HENT:      Head: Normocephalic and atraumatic.      Mouth/Throat:      Mouth: Mucous membranes are dry.      Pharynx: Oropharynx is clear.   Eyes:      General:         Right eye: Discharge present.         Left eye: Discharge present.     Extraocular Movements: Extraocular movements intact.      Pupils: Pupils are equal, round, and reactive to light.   Cardiovascular:      Rate and Rhythm: Rhythm irregular.      Pulses: Normal pulses.   Pulmonary:      Effort: Pulmonary effort is normal.      Breath sounds: Examination of the right-middle field reveals decreased breath sounds. Examination of the left-middle field reveals decreased breath sounds. Examination of the right-lower field reveals decreased breath sounds. Examination of the left-lower field reveals decreased breath sounds. Decreased breath sounds present.   Abdominal:      General: Bowel sounds are normal. There is no distension.      Palpations: Abdomen is soft.      Tenderness: There is no abdominal tenderness.   Musculoskeletal:         General: Swelling and tenderness present.      Cervical back: Normal range of motion and neck supple.      Right lower leg: Tenderness present. 2+ Edema present.      Left lower leg: Tenderness present. 2+ Edema present.      Right ankle: Swelling present. Tenderness present.      Left ankle: Swelling present. Tenderness present.      Right foot: Swelling and tenderness present.      Left foot: Swelling and tenderness present.   Skin:     General: Skin is warm and dry.      Capillary Refill: Capillary refill takes less than 2 seconds.             Comments: Bilateral lower legs within above margins are red, swollen, crusting and painful to touch   Neurological:      Mental Status: She is lethargic and confused.      GCS: GCS eye subscore is 3. GCS verbal subscore is 4. GCS motor subscore is 6.      Comments: Wakes to voice and obeys commands. Oriented to self and  that she is in a hospital for an infection. Recognizes daughter at bedside             Significant Labs: All pertinent labs within the past 24 hours have been reviewed.  CBC:   Recent Labs   Lab 04/22/24  1607 04/23/24  0126 04/24/24  0438   WBC 9.39 8.44 8.70   HGB 14.7 12.7 12.3   HCT 46.8 40.8 39.2    228 174     CMP:   Recent Labs   Lab 04/22/24  1607 04/23/24  0126 04/24/24  0438   * 133* 132*   K 4.9 3.8 3.6   CL 95 98 99   CO2 24 26 23   * 101 89   BUN 29* 22 15   CREATININE 1.0 0.8 0.8   CALCIUM 9.5 8.5* 8.5*   PROT 7.5  --   --    ALBUMIN 2.8*  --   --    BILITOT 1.1*  --   --    ALKPHOS 177*  --   --    AST 50*  --   --    ALT 36  --   --    ANIONGAP 12 9 10     Microbiology Results (last 7 days)       Procedure Component Value Units Date/Time    Blood Culture #1 [[id number]] Collected: 04/22/24 1607    Order Status: Completed Specimen: Blood from Peripheral, Forearm, Left Updated: 04/23/24 2032     Blood Culture, Routine No Growth to date      No Growth to date    Blood culture x two cultures. Draw prior to antibiotics. [[id number]] Collected: 04/22/24 1750    Order Status: Completed Specimen: Blood from Peripheral, Forearm, Left Updated: 04/23/24 2032     Blood Culture, Routine No Growth to date      No Growth to date    Narrative:      Aerobic and anaerobic    Culture, Respiratory [[id number]]     Order Status: No result Specimen: Respiratory from Sputum     Influenza A & B by Molecular [[id number]] Collected: 04/22/24 1753    Order Status: Completed Specimen: Nasopharyngeal Swab Updated: 04/22/24 1823     Influenza A, Molecular Negative     Influenza B, Molecular Negative     Flu A & B Source Nasal swab    Blood culture x two cultures. Draw prior to antibiotics. [[id number]]     Order Status: Canceled Specimen: Blood from Peripheral, Forearm, Left           Significant Imaging:   CXR:  New increase opacification left lateral lung base and that may be due to overlapping shadows or  mild atelectasis. Suggest EPA and lateral views of the chest for further evaluation if the patient's condition permits.    CT Head:  No acute intracranial finding.  Mild cerebral involutional change.  Advanced white matter disease, unchanged.    Assessment/Plan:      * Pneumonia  Chest xray shows possible pneumonia. Lactic Acid from 2.3-4.1    -Trend Lactic  -Additional fluid bolus  -continue intravenous Zosyn and vancomycin  -Monitor for signs/symptoms of deterioration  -cbc/cmp/mag/phos daily       ACP (advance care planning)  Advance Care Planning    Date: 04/23/2024    Living Will  During this visit, I engaged the patient  in the voluntary advance care planning process.  The patient and I reviewed the role for advance directives and their purpose in directing future healthcare if the patient's unable to speak for herself.  At this point in time, the patient does have full decision-making capacity.  We discussed different extreme health states that she could experience, and reviewed what kind of medical care she would want in those situations.  The patient communicated that if she were comatose and had little chance of a meaningful recovery, she would want machines/life-sustaining treatments used.  Patient's daughter was present during interview.  I spent a total of 16 minutes engaging the patient in this advance care planning discussion.               Moderate protein-calorie malnutrition  Most recent weight and BMI monitored-     Measurements:  Wt Readings from Last 1 Encounters:   04/23/24 124.8 kg (275 lb 2.2 oz)   Body mass index is 48.74 kg/m².    Albumin 2.7    -Inpatient consult to nutrition         Atrial flutter  Atrial flutter seen on EKG. Patient not on anticoagulation at home.     -Continue metoprolol 50mg BID  -Telemetry monitoring    Primary hypertension  Chronic, controlled. Latest blood pressure and vitals reviewed-     Temp:  [97.2 °F (36.2 °C)-98.1 °F (36.7 °C)]   Pulse:  [68-92]   Resp:   [16-19]   BP: (100-118)/(58-83)   SpO2:  [94 %-98 %] .   Home meds for hypertension were reviewed and noted below.   Hypertension Medications               furosemide (LASIX) 20 MG tablet Take 1 tablet (20 mg total) by mouth once daily.    metoprolol tartrate (LOPRESSOR) 50 MG tablet Take 1 tablet (50 mg total) by mouth 2 (two) times daily.    spironolactone (ALDACTONE) 25 MG tablet Take 25 mg by mouth once daily.            While in the hospital, will manage blood pressure as follows; Continue home antihypertensive regimen    Will utilize p.r.n. blood pressure medication only if patient's blood pressure greater than 180/110 and she develops symptoms such as worsening chest pain or shortness of breath.    Chronic venous stasis dermatitis of both lower extremities  Chronic problem for two months.    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 Dementia is moderate. The patient does not have signs of behavioral disturbance. Home dementia medications are Held or Continued: patient does not take any medications specific to dementia . Continue non-pharmacologic interventions to prevent delirium (No VS between 11PM-5AM, activity during day, opening blinds, providing glasses/hearing aids, and up in chair during daytime). Will avoid narcotics and benzos unless absolutely necessary. PRN anti-psychotics are not prescribed to avoid self harm behaviors.      VTE Risk Mitigation (From admission, onward)           Ordered     enoxaparin injection 40 mg  Every 12 hours         04/23/24 0751     IP VTE HIGH RISK PATIENT  Once         04/22/24 1909     Place sequential compression device  Until discontinued         04/22/24 1909                    Discharge Planning   MAIRA: 4/25/2024     Code Status: Full Code   Is the patient medically ready for discharge?:     Reason for patient still in hospital (select all that apply):  {HMREASONPATIENTINHOSP:13036}  Discharge Plan A: Skilled Nursing Facility                  Dorothy Ivy MD  Department of Hospital Medicine   [place]/Surg

## 2024-04-24 NOTE — PT/OT/SLP PROGRESS
"Occupational Therapy   Treatment    Name: Ghazala Hagen  MRN: [id number]  Admitting Diagnosis:  Pneumonia       Recommendations:     Discharge Recommendations: Moderate Intensity Therapy  Discharge Equipment Recommendations:   (TBD)  Barriers to discharge:  Decreased caregiver support    Assessment:     Ghazala Hagen is a 88 y.o. female with a medical diagnosis of Pneumonia.  She presents with c/o BLE pain only when BLE are touched. Patient required Max A with bed mobility. Patient was able to sit EOB ~15 min requiring SBA after receiving encouragement from therapist and daughter. Patient stood with 3 to 4 trials requiring Mod A using RW. Each standing trial patient was able to stand for ~5 seconds each time. Performance deficits affecting function are weakness, impaired endurance, impaired self care skills, impaired functional mobility, gait instability, impaired balance, decreased lower extremity function, decreased coordination, pain, impaired skin, edema, impaired cognition.     Rehab Prognosis:  Fair; patient would benefit from acute skilled OT services to address these deficits and reach maximum level of function.       Plan:     Patient to be seen 6 x/week to address the above listed problems via self-care/home management, therapeutic activities, therapeutic exercises  Plan of Care Expires: 05/21/24  Plan of Care Reviewed with: patient, daughter    Subjective     Chief Complaint: BLE pain  Patient/Family Comments/goals: "Don't touch my legs!"  Pain/Comfort:  Pain Rating 1:  (not rated)  Location - Side 1: Bilateral  Location - Orientation 1: lower  Location 1: leg  Pain Addressed 1: Reposition, Distraction  Pain Rating Post-Intervention 1:  (not rated)    Objective:     Communicated with: nurse Beasley prior to session.  Patient found HOB elevated with telemetry, peripheral IV, PureWick upon OT entry to room.    General Precautions: Standard, fall    Orthopedic Precautions:N/A  Braces: N/A  Respiratory Status: " Room air     Occupational Performance:     Bed Mobility:    Patient completed Scooting/Bridging with maximal assistance  Patient completed Supine to Sit with maximal assistance  Patient completed Sit to Supine with maximal assistance and 2 persons     Functional Mobility/Transfers:  Patient completed Sit <> Stand Transfer with moderate assistance  with  rolling walker   Functional Mobility: Patient able to take 12 small steps toward HOB requiring frequent sitting rest breaks every 4 steps.     Activities of Daily Living:  Feeding:  stand by assistance with eating a few bites of a banana and taking a few sips of her coke through a straw  Toileting: dependence with Purewick in place      [facility] 6 Click ADL:      Treatment & Education:  OT ed patient on safety with walker use for functional mobility with cues for hand placement & sequencing.       Patient left HOB elevated with all lines intact, call button in reach, bed alarm on, nurse notified, and daughter present    GOALS:   Multidisciplinary Problems       Occupational Therapy Goals          Problem: Occupational Therapy    Goal Priority Disciplines Outcome Interventions   Occupational Therapy Goal     OT, PT/OT Progressing    Description: Goals to be met by: 5/21/2024     Patient will increase functional independence with ADLs by performing:    Feeding with Minimal Assistance.  UE Dressing with Moderate Assistance.  Grooming while seated with Moderate Assistance.  Sitting at edge of bed x 15 minutes with Moderate Assistance.  Rolling to Bilateral with Moderate Assistance.                          Time Tracking:     OT Date of Treatment: 04/24/24  OT Start Time: 1010  OT Stop Time: 1041  OT Total Time (min): 31 min    Billable Minutes:Self Care/Home Management 31    OT/ANTONIETTA: OT          4/24/2024

## 2024-04-24 NOTE — PT/OT/SLP PROGRESS
"Physical Therapy Treatment    Patient Name:  Ghazala Hagen   MRN:  [id number]    Recommendations:     Discharge Recommendations: Moderate Intensity Therapy  Discharge Equipment Recommendations: none  Barriers to discharge: Decreased caregiver support    Assessment:     Ghazala Hagen is a 88 y.o. female admitted with a medical diagnosis of Pneumonia.  She presents with the following impairments/functional limitations: weakness, impaired endurance, impaired functional mobility, gait instability, impaired balance, decreased lower extremity function, pain, impaired cognition, decreased ROM, impaired skin, edema, impaired cardiopulmonary response to activity .    Pt seen supine in bed. Pt awake, alert and participating with LE's thera ex. Pt asking for nelly ice cream. Daughter at bedside. Pt requiring max assist mobility.  Mod intensity.    Rehab Prognosis: Fair; patient would benefit from acute skilled PT services to address these deficits and reach maximum level of function.    Recent Surgery: * No surgery found *      Plan:     During this hospitalization, patient to be seen 6 x/week to address the identified rehab impairments via therapeutic activities, therapeutic exercises, neuromuscular re-education and progress toward the following goals:    Plan of Care Expires:  05/30/24    Subjective   Pt alert and "sassy" this pm  Wanting nelly ice cream- settled for popsicle  Chief Complaint: tired  Patient/Family Comments/goals: none stated  Pain/Comfort:         Objective:     Communicated with nurse Beasley prior to session.  Patient found HOB elevated with telemetry, PureWick, peripheral IV upon PT entry to room.     General Precautions: Standard, fall  Orthopedic Precautions: N/A  Braces: N/A  Respiratory Status: Room air     Functional Mobility:  Bed Mobility:     Scooting: maximal assistance      AM-PAC 6 CLICK MOBILITY          Treatment & Education:  Patient was educated on the importance of OOB activity and " functional mobility to negate negative effects of prolonged bed rest during hospitalization, safe transfers and ambulation, and D/C planning   Thera ex with Ap,QS,assisted SLR and HS  Bilateral lower legs bandaged- has stasis ulcers    Patient left HOB elevated with all lines intact, call button in reach, bed alarm on, and daughter present..    GOALS:   Multidisciplinary Problems       Physical Therapy Goals          Problem: Physical Therapy    Goal Priority Disciplines Outcome Goal Variances Interventions   Physical Therapy Goal     PT, PT/OT Progressing     Description: Goals to be met by: 2024     Patient will increase functional independence with mobility by performin. Supine to sit with Moderate Assistance  2. Sit to stand transfer with Moderate Assistance  3. Bed to chair transfer with Moderate Assistance using Rolling Walker  4. Gait  x 10 feet with Moderate Assistance using Rolling Walker.   5. Sitting at edge of bed x20 minutes with Moderate Assistance  6. Lower extremity exercise program x10-20 reps                       Time Tracking:     PT Received On: 24  PT Start Time: 1453     PT Stop Time: 1503  PT Total Time (min): 10 min     Billable Minutes: Therapeutic Exercise 10    Treatment Type: Treatment  PT/PTA: PT     Number of PTA visits since last PT visit: 0     2024

## 2024-04-24 NOTE — HOSPITAL COURSE
Patient admitted to Hospital Medicine on telemetry floor where patient was maintained on intravenous antibiotic therapy.  Microbiology results closely followed.  Patient was evaluated by wound care nurse who managed patient for bilateral lower extremity venous ulcers and deep tissue injury of buttocks.  Patient worked with PT and OT.

## 2024-04-25 LAB
ACINETOBACTER CALCOACETICUS/BAUMANNII COMPLEX: NOT DETECTED
ANION GAP SERPL CALC-SCNC: 9 MMOL/L (ref 8–16)
BACTEROIDES FRAGILIS: NOT DETECTED
BASOPHILS # BLD AUTO: 0.07 K/UL (ref 0–0.2)
BASOPHILS NFR BLD: 1.2 % (ref 0–1.9)
BUN SERPL-MCNC: 10 MG/DL (ref 8–23)
CALCIUM SERPL-MCNC: 8.2 MG/DL (ref 8.7–10.5)
CANDIDA ALBICANS: NOT DETECTED
CANDIDA AURIS: NOT DETECTED
CANDIDA GLABRATA: NOT DETECTED
CANDIDA KRUSEI: NOT DETECTED
CANDIDA PARAPSILOSIS: NOT DETECTED
CANDIDA TROPICALIS: NOT DETECTED
CHLORIDE SERPL-SCNC: 101 MMOL/L (ref 95–110)
CO2 SERPL-SCNC: 25 MMOL/L (ref 23–29)
CREAT SERPL-MCNC: 0.7 MG/DL (ref 0.5–1.4)
CRYPTOCOCCUS NEOFORMANS/GATTII: NOT DETECTED
CTX-M GENE (ESBL PRODUCER): NORMAL
DIFFERENTIAL METHOD BLD: ABNORMAL
ENTEROBACTER CLOACAE COMPLEX: NOT DETECTED
ENTEROBACTERALES: NOT DETECTED
ENTEROCOCCUS FAECALIS: NOT DETECTED
ENTEROCOCCUS FAECIUM: NOT DETECTED
EOSINOPHIL # BLD AUTO: 0.3 K/UL (ref 0–0.5)
EOSINOPHIL NFR BLD: 5.3 % (ref 0–8)
ERYTHROCYTE [DISTWIDTH] IN BLOOD BY AUTOMATED COUNT: 19.6 % (ref 11.5–14.5)
ESCHERICHIA COLI: NOT DETECTED
EST. GFR  (NO RACE VARIABLE): >60 ML/MIN/1.73 M^2
GLUCOSE SERPL-MCNC: 77 MG/DL (ref 70–110)
HAEMOPHILUS INFLUENZAE: NOT DETECTED
HCT VFR BLD AUTO: 42 % (ref 37–48.5)
HGB BLD-MCNC: 12.9 G/DL (ref 12–16)
IMM GRANULOCYTES # BLD AUTO: 0.15 K/UL (ref 0–0.04)
IMM GRANULOCYTES NFR BLD AUTO: 2.6 % (ref 0–0.5)
IMP GENE (CARBAPENEM RESISTANT): NORMAL
KLEBSIELLA AEROGENES: NOT DETECTED
KLEBSIELLA OXYTOCA: NOT DETECTED
KLEBSIELLA PNEUMONIAE GROUP: NOT DETECTED
KPC RESISTANCE GENE (CARBAPENEM): NORMAL
LISTERIA MONOCYTOGENES: NOT DETECTED
LYMPHOCYTES # BLD AUTO: 1.6 K/UL (ref 1–4.8)
LYMPHOCYTES NFR BLD: 27.7 % (ref 18–48)
MAGNESIUM SERPL-MCNC: 1.6 MG/DL (ref 1.6–2.6)
MCH RBC QN AUTO: 26.4 PG (ref 27–31)
MCHC RBC AUTO-ENTMCNC: 30.7 G/DL (ref 32–36)
MCR-1: NORMAL
MCV RBC AUTO: 86 FL (ref 82–98)
MEC A/C AND MREJ (MRSA): NORMAL
MEC A/C: NORMAL
MONOCYTES # BLD AUTO: 0.6 K/UL (ref 0.3–1)
MONOCYTES NFR BLD: 10.9 % (ref 4–15)
MRSA SCREEN BY PCR: NEGATIVE
NDM GENE (CARBAPENEM RESISTANT): NORMAL
NEISSERIA MENINGITIDIS: NOT DETECTED
NEUTROPHILS # BLD AUTO: 3.1 K/UL (ref 1.8–7.7)
NEUTROPHILS NFR BLD: 52.3 % (ref 38–73)
NRBC BLD-RTO: 0 /100 WBC
OXA-48-LIKE (CARBAPENEM RESISTANT): NORMAL
PHOSPHATE SERPL-MCNC: 3.1 MG/DL (ref 2.7–4.5)
PLATELET # BLD AUTO: 188 K/UL (ref 150–450)
PMV BLD AUTO: 9.9 FL (ref 9.2–12.9)
POTASSIUM SERPL-SCNC: 3.4 MMOL/L (ref 3.5–5.1)
PROTEUS SPECIES: NOT DETECTED
PSEUDOMONAS AERUGINOSA: NOT DETECTED
RBC # BLD AUTO: 4.88 M/UL (ref 4–5.4)
SALMONELLA SP: NOT DETECTED
SERRATIA MARCESCENS: NOT DETECTED
SODIUM SERPL-SCNC: 135 MMOL/L (ref 136–145)
STAPHYLOCOCCUS AUREUS: NOT DETECTED
STAPHYLOCOCCUS EPIDERMIDIS: NOT DETECTED
STAPHYLOCOCCUS LUGDUNESIS: NOT DETECTED
STAPHYLOCOCCUS SPECIES: NOT DETECTED
STENOTROPHOMONAS MALTOPHILIA: NOT DETECTED
STREPTOCOCCUS AGALACTIAE: NOT DETECTED
STREPTOCOCCUS PNEUMONIAE: NOT DETECTED
STREPTOCOCCUS PYOGENES: NOT DETECTED
STREPTOCOCCUS SPECIES: NOT DETECTED
VAN A/B (VRE GENE): NORMAL
VANCOMYCIN SERPL-MCNC: 17.8 UG/ML
VIM GENE (CARBAPENEM RESISTANT): NORMAL
WBC # BLD AUTO: 5.88 K/UL (ref 3.9–12.7)

## 2024-04-25 PROCEDURE — 25000003 PHARM REV CODE 250

## 2024-04-25 PROCEDURE — 84100 ASSAY OF PHOSPHORUS: CPT

## 2024-04-25 PROCEDURE — 94664 DEMO&/EVAL PT USE INHALER: CPT

## 2024-04-25 PROCEDURE — 80202 ASSAY OF VANCOMYCIN: CPT | Performed by: INTERNAL MEDICINE

## 2024-04-25 PROCEDURE — 80048 BASIC METABOLIC PNL TOTAL CA: CPT

## 2024-04-25 PROCEDURE — 97110 THERAPEUTIC EXERCISES: CPT

## 2024-04-25 PROCEDURE — 87641 MR-STAPH DNA AMP PROBE: CPT | Performed by: INTERNAL MEDICINE

## 2024-04-25 PROCEDURE — 25000003 PHARM REV CODE 250: Performed by: INTERNAL MEDICINE

## 2024-04-25 PROCEDURE — 11000001 HC ACUTE MED/SURG PRIVATE ROOM

## 2024-04-25 PROCEDURE — 94761 N-INVAS EAR/PLS OXIMETRY MLT: CPT

## 2024-04-25 PROCEDURE — 36415 COLL VENOUS BLD VENIPUNCTURE: CPT | Performed by: INTERNAL MEDICINE

## 2024-04-25 PROCEDURE — 83735 ASSAY OF MAGNESIUM: CPT

## 2024-04-25 PROCEDURE — 99900035 HC TECH TIME PER 15 MIN (STAT)

## 2024-04-25 PROCEDURE — 97535 SELF CARE MNGMENT TRAINING: CPT

## 2024-04-25 PROCEDURE — 85025 COMPLETE CBC W/AUTO DIFF WBC: CPT

## 2024-04-25 PROCEDURE — 63600175 PHARM REV CODE 636 W HCPCS: Performed by: INTERNAL MEDICINE

## 2024-04-25 RX ADMIN — OXYCODONE AND ACETAMINOPHEN 1 TABLET: 7.5; 325 TABLET ORAL at 06:04

## 2024-04-25 RX ADMIN — METOPROLOL TARTRATE 50 MG: 50 TABLET, FILM COATED ORAL at 08:04

## 2024-04-25 RX ADMIN — SPIRONOLACTONE 25 MG: 25 TABLET ORAL at 08:04

## 2024-04-25 RX ADMIN — PIPERACILLIN AND TAZOBACTAM 4.5 G: 4; .5 INJECTION, POWDER, LYOPHILIZED, FOR SOLUTION INTRAVENOUS; PARENTERAL at 11:04

## 2024-04-25 RX ADMIN — ENOXAPARIN SODIUM 40 MG: 40 INJECTION SUBCUTANEOUS at 08:04

## 2024-04-25 RX ADMIN — PIPERACILLIN AND TAZOBACTAM 4.5 G: 4; .5 INJECTION, POWDER, LYOPHILIZED, FOR SOLUTION INTRAVENOUS; PARENTERAL at 12:04

## 2024-04-25 RX ADMIN — FUROSEMIDE 20 MG: 20 TABLET ORAL at 08:04

## 2024-04-25 RX ADMIN — VANCOMYCIN HYDROCHLORIDE 1000 MG: 1 INJECTION, POWDER, LYOPHILIZED, FOR SOLUTION INTRAVENOUS at 08:04

## 2024-04-25 RX ADMIN — LEVOTHYROXINE SODIUM 200 MCG: 0.1 TABLET ORAL at 05:04

## 2024-04-25 RX ADMIN — DOCUSATE SODIUM AND SENNOSIDES 1 TABLET: 8.6; 5 TABLET, FILM COATED ORAL at 08:04

## 2024-04-25 RX ADMIN — ESCITALOPRAM OXALATE 20 MG: 10 TABLET, FILM COATED ORAL at 08:04

## 2024-04-25 RX ADMIN — PIPERACILLIN AND TAZOBACTAM 4.5 G: 4; .5 INJECTION, POWDER, LYOPHILIZED, FOR SOLUTION INTRAVENOUS; PARENTERAL at 10:04

## 2024-04-25 RX ADMIN — OXYCODONE AND ACETAMINOPHEN 1 TABLET: 7.5; 325 TABLET ORAL at 11:04

## 2024-04-25 RX ADMIN — PANTOPRAZOLE SODIUM 40 MG: 40 TABLET, DELAYED RELEASE ORAL at 08:04

## 2024-04-25 RX ADMIN — PIPERACILLIN AND TAZOBACTAM 4.5 G: 4; .5 INJECTION, POWDER, LYOPHILIZED, FOR SOLUTION INTRAVENOUS; PARENTERAL at 04:04

## 2024-04-25 NOTE — PLAN OF CARE
8:24am - Returned call to Javi at [facility] [phone], left message jeremie/ Racquel     04/25/24 1678   Post-Acute Status   Post-Acute Authorization Placement   Post-Acute Placement Status Pending Bed Availability   Discharge Plan   Discharge Plan A Skilled Nursing Facility   Discharge Plan B Skilled Nursing Facility

## 2024-04-25 NOTE — PT/OT/SLP PROGRESS
Physical Therapy Treatment    Patient Name:  Ghazala Hagen   MRN:  [id number]    Recommendations:     Discharge Recommendations: Moderate Intensity Therapy  Discharge Equipment Recommendations: none  Barriers to discharge: Decreased caregiver support    Assessment:     Ghazala Hagen is a 88 y.o. female admitted with a medical diagnosis of Pneumonia.  She presents with the following impairments/functional limitations: weakness, impaired endurance, impaired functional mobility, gait instability, impaired balance, decreased lower extremity function, pain, impaired cognition, decreased ROM, impaired skin, edema, impaired cardiopulmonary response to activity .    Pt seen supine in bed, awake and agreeable to PT. Pt seen for AAROME to bilateral LE's including isometrics. Lower legs bandaged from stasis ulcers..    Rehab Prognosis: Fair; patient would benefit from acute skilled PT services to address these deficits and reach maximum level of function.    Recent Surgery: * No surgery found *      Plan:     During this hospitalization, patient to be seen 6 x/week to address the identified rehab impairments via therapeutic activities, therapeutic exercises, neuromuscular re-education and progress toward the following goals:    Plan of Care Expires:  05/30/24    Subjective   Stated of being cold  Chief Complaint: feet are sensitive  Patient/Family Comments/goals: none  Pain/Comfort:  Pain Rating 1: 0/10      Objective:     Communicated with nurse cobb prior to session.  Patient found HOB elevated with telemetry, PureWick, peripheral IV upon PT entry to room.     General Precautions: Standard, fall  Orthopedic Precautions: N/A  Braces: N/A  Respiratory Status: Room air     Functional Mobility:  Bed Mobility:     Scooting: maximal assistance and total assistance      AM-PAC 6 CLICK MOBILITY          Treatment & Education:  Patient was educated on the importance of OOB activity and functional mobility to negate negative effects  of prolonged bed rest during hospitalization, safe transfers and ambulation, and D/C planning   Thera ex including isometrics, QS/GS. Completed AP,HS,leg raises and abd/add    Patient left HOB elevated with all lines intact, call button in reach, and family present..    GOALS:   Multidisciplinary Problems       Physical Therapy Goals          Problem: Physical Therapy    Goal Priority Disciplines Outcome Goal Variances Interventions   Physical Therapy Goal     PT, PT/OT Progressing     Description: Goals to be met by: 2024     Patient will increase functional independence with mobility by performin. Supine to sit with Moderate Assistance  2. Sit to stand transfer with Moderate Assistance  3. Bed to chair transfer with Moderate Assistance using Rolling Walker  4. Gait  x 10 feet with Moderate Assistance using Rolling Walker.   5. Sitting at edge of bed x20 minutes with Moderate Assistance  6. Lower extremity exercise program x10-20 reps                       Time Tracking:     PT Received On: 24  PT Start Time: 1453     PT Stop Time: 1504  PT Total Time (min): 11 min     Billable Minutes: Therapeutic Exercise 11    Treatment Type: Treatment  PT/PTA: PT     Number of PTA visits since last PT visit: 0     2024

## 2024-04-25 NOTE — CARE UPDATE
04/25/24 0744   Patient Assessment/Suction   Level of Consciousness (AVPU) alert   Respiratory Effort Unlabored   Expansion/Accessory Muscles/Retractions no use of accessory muscles;expansion symmetric;no retractions   All Lung Fields Breath Sounds Anterior:;Lateral:;diminished   Rhythm/Pattern, Respiratory unlabored;pattern regular;depth regular;no shortness of breath reported   Cough Frequency no cough   PRE-TX-O2   Device (Oxygen Therapy) room air   SpO2 (!) 93 %   Pulse Oximetry Type Intermittent   $ Pulse Oximetry - Multiple Charge Pulse Oximetry - Multiple   Pulse 73   Resp 16   Aerosol Therapy   $ Aerosol Therapy Charges PRN treatment not required   Respiratory Treatment Status (SVN) PRN treatment not required

## 2024-04-25 NOTE — PROGRESS NOTES
[facility] Medicine  Progress Note    Patient Name: Ghazala Hagen  MRN: [id number]  Patient Class: IP- Inpatient   Admission Date: 4/22/2024  Length of Stay: 3 days  Attending Physician: Dorothy Ivy MD  Primary Care Provider: Lenore Hanson MD        Subjective:     Principal Problem:Pneumonia        HPI:  Ghazala Hagen is an 88 year old female with a previous medical history of HTN, Thyroid diease, dementia, and depression who was informed by PCP at [facility] to present to the emergency room for sepsis. Per blood work performed on Friday due blood pressure being in the 90's systolic she had lactic acid of 29, sodium of 128, and sed rate of 79. Her family brought her to [facility] initially to discuss options of nursing home placement. The patient currently resides in assisted living at [facility] and has a progressive need for more assistance with ADL's. Per daughter at bedside her mother sleeps most of the day and does not get of out bed and when she does she is only able to use her walker to go short distances in a room. The daughter also reports her mother has been increasingly confused with intermittent hallucinations. Currently the patient is also having bilateral lower extremity venous stasis wounds dressed by [facility] by their palliative wound care team for the past two months. Review of systems performed with daughter of patient endorsed that patient had mild upper respiratory symptoms of cough and congestion. Initial ED evaluation shows a lactic of 2.3, sodium of 131, albumin of 2.7 and elevated liver enzymes. CBC shows no leukocytosis and urine studies negative for infection. Chest xray reads as left lower lobe pneumonia and patient was initiated on azithromycin and ceftriaxone in emergency room. EKG read atrial flutter and patient on metoprolol 50mg BID but no anticoagulation per med rec performed by family. Upon examination patient wakes to voice,  obeys commands and is oriented to self and that she is in a hospital for an infection. Patient then endorses she just wants to rest. Patient to be admitted by hospital medicine for further evaluation and management.    Overview/Hospital Course:  Patient admitted to Hospital Medicine on telemetry floor where patient was maintained on intravenous antibiotic therapy.  Microbiology results closely followed.  Patient was evaluated by wound care nurse who managed patient for bilateral lower extremity venous ulcers and deep tissue injury of buttocks.  Patient worked with PT and OT.    Interval History:  Patient is continuing to improve. Patient's daughter is present at bedside. Patient with bilateral chronic lower extremity venous stasis ulcers.  Denies any new focal subjective complaints.  Reports occasional cough but no swallowing dysfunction.  Patient's buttocks have deep tissue injury prior of arrival which is being managed by wound care nurse.    Review of Systems   Reason unable to perform ROS: ROS largely performed by daughter at bedside due to patient dementia.   Constitutional:  Positive for activity change and fatigue.   HENT:  Positive for congestion and postnasal drip.    Respiratory:  Positive for cough.         Non productive   Cardiovascular:  Positive for leg swelling.   Musculoskeletal:  Positive for gait problem.   Skin:  Positive for color change and wound.   Psychiatric/Behavioral:  Positive for confusion and hallucinations.         Per daughter patient having hallucinations and increased confusion     Objective:     Vital Signs (Most Recent):  Temp: 97.8 °F (36.6 °C) (04/25/24 0820)  Pulse: 77 (04/25/24 0823)  Resp: 14 (04/25/24 0820)  BP: (!) 109/58 (04/25/24 0823)  SpO2: (!) 94 % (04/25/24 0820) Vital Signs (24h Range):  Temp:  [97.8 °F (36.6 °C)-98.8 °F (37.1 °C)] 97.8 °F (36.6 °C)  Pulse:  [65-86] 77  Resp:  [14-19] 14  SpO2:  [93 %-98 %] 94 %  BP: (102-115)/(53-67) 109/58     Weight: 124.8 kg (275  lb 2.2 oz)  Body mass index is 48.74 kg/m².    Intake/Output Summary (Last 24 hours) at 4/25/2024 0843  Last data filed at 4/25/2024 0605  Gross per 24 hour   Intake 1795 ml   Output 800 ml   Net 995 ml         Physical Exam  Vitals reviewed.   Constitutional:       Appearance: She is obese. She is ill-appearing.      Comments: Chronically ill appearing   HENT:      Head: Normocephalic and atraumatic.      Mouth/Throat:      Mouth: Mucous membranes are dry.      Pharynx: Oropharynx is clear.   Eyes:      General:         Right eye: Discharge present.         Left eye: Discharge present.     Extraocular Movements: Extraocular movements intact.      Pupils: Pupils are equal, round, and reactive to light.   Cardiovascular:      Rate and Rhythm: Rhythm irregular.      Pulses: Normal pulses.   Pulmonary:      Effort: Pulmonary effort is normal.      Breath sounds: Examination of the right-middle field reveals decreased breath sounds. Examination of the left-middle field reveals decreased breath sounds. Examination of the right-lower field reveals decreased breath sounds. Examination of the left-lower field reveals decreased breath sounds. Decreased breath sounds present.   Abdominal:      General: Bowel sounds are normal. There is no distension.      Palpations: Abdomen is soft.      Tenderness: There is no abdominal tenderness.   Musculoskeletal:         General: Swelling and tenderness present.      Cervical back: Normal range of motion and neck supple.      Right lower leg: Tenderness present. 2+ Edema present.      Left lower leg: Tenderness present. 2+ Edema present.      Right ankle: Swelling present. Tenderness present.      Left ankle: Swelling present. Tenderness present.      Right foot: Swelling and tenderness present.      Left foot: Swelling and tenderness present.   Skin:     General: Skin is warm and dry.      Capillary Refill: Capillary refill takes less than 2 seconds.             Comments: Bilateral  lower legs within above margins are red, swollen, crusting and painful to touch   Neurological:      Mental Status: She is lethargic and confused.      GCS: GCS eye subscore is 3. GCS verbal subscore is 4. GCS motor subscore is 6.      Comments: Wakes to voice and obeys commands. Oriented to self and that she is in a hospital for an infection. Recognizes daughter at bedside             Significant Labs: All pertinent labs within the past 24 hours have been reviewed.  CBC:   Recent Labs   Lab 04/24/24 0438 04/25/24  0400   WBC 8.70 5.88   HGB 12.3 12.9   HCT 39.2 42.0    188     CMP:   Recent Labs   Lab 04/24/24 0438 04/25/24  0400   * 135*   K 3.6 3.4*   CL 99 101   CO2 23 25   GLU 89 77   BUN 15 10   CREATININE 0.8 0.7   CALCIUM 8.5* 8.2*   ANIONGAP 10 9     Microbiology Results (last 7 days)       Procedure Component Value Units Date/Time    Blood culture x two cultures. Draw prior to antibiotics. [[id number]] Collected: 04/22/24 1750    Order Status: Completed Specimen: Blood from Peripheral, Forearm, Left Updated: 04/24/24 2032     Blood Culture, Routine No Growth to date      No Growth to date      No Growth to date    Narrative:      Aerobic and anaerobic    Blood Culture #1 [[id number]] Collected: 04/22/24 1607    Order Status: Completed Specimen: Blood from Peripheral, Forearm, Left Updated: 04/24/24 2032     Blood Culture, Routine No Growth to date      No Growth to date      No Growth to date    Culture, Respiratory [[id number]]     Order Status: No result Specimen: Respiratory from Sputum     Influenza A & B by Molecular [[id number]] Collected: 04/22/24 1753    Order Status: Completed Specimen: Nasopharyngeal Swab Updated: 04/22/24 1823     Influenza A, Molecular Negative     Influenza B, Molecular Negative     Flu A & B Source Nasal swab    Blood culture x two cultures. Draw prior to antibiotics. [[id number]]     Order Status: Canceled Specimen: Blood from Peripheral, Forearm, Left            Significant Imaging:   CXR:  New increase opacification left lateral lung base and that may be due to overlapping shadows or mild atelectasis. Suggest EPA and lateral views of the chest for further evaluation if the patient's condition permits.    CT Head:  No acute intracranial finding.  Mild cerebral involutional change.  Advanced white matter disease, unchanged.    Assessment/Plan:      * Pneumonia  Chest xray shows possible pneumonia. Lactic Acid from 2.3-4.1    -Trend Lactic  -Additional fluid bolus  -continue intravenous Zosyn and vancomycin  -Monitor for signs/symptoms of deterioration  -cbc/cmp/mag/phos daily       Venous ulcers of both lower extremities  Wound care nurse following and also managing deep tissue injury of buttocks as well.      ACP (advance care planning)  Advance Care Planning    Date: 04/23/2024    Living Will  During this visit, I engaged the patient  in the voluntary advance care planning process.  The patient and I reviewed the role for advance directives and their purpose in directing future healthcare if the patient's unable to speak for herself.  At this point in time, the patient does have full decision-making capacity.  We discussed different extreme health states that she could experience, and reviewed what kind of medical care she would want in those situations.  The patient communicated that if she were comatose and had little chance of a meaningful recovery, she would want machines/life-sustaining treatments used.  Patient's daughter was present during interview.  I spent a total of 16 minutes engaging the patient in this advance care planning discussion.               Moderate protein-calorie malnutrition  Most recent weight and BMI monitored-     Measurements:  Wt Readings from Last 1 Encounters:   04/23/24 124.8 kg (275 lb 2.2 oz)   Body mass index is 48.74 kg/m².    Albumin 2.7    -Inpatient consult to nutrition         Atrial flutter  Atrial flutter seen on EKG. Patient  not on anticoagulation at home.     -Continue metoprolol 50mg BID  -Telemetry monitoring    Primary hypertension  Chronic, controlled. Latest blood pressure and vitals reviewed-     Temp:  [97.2 °F (36.2 °C)-98.1 °F (36.7 °C)]   Pulse:  [68-92]   Resp:  [16-19]   BP: (100-118)/(58-83)   SpO2:  [94 %-98 %] .   Home meds for hypertension were reviewed and noted below.   Hypertension Medications               furosemide (LASIX) 20 MG tablet Take 1 tablet (20 mg total) by mouth once daily.    metoprolol tartrate (LOPRESSOR) 50 MG tablet Take 1 tablet (50 mg total) by mouth 2 (two) times daily.    spironolactone (ALDACTONE) 25 MG tablet Take 25 mg by mouth once daily.            While in the hospital, will manage blood pressure as follows; Continue home antihypertensive regimen    Will utilize p.r.n. blood pressure medication only if patient's blood pressure greater than 180/110 and she develops symptoms such as worsening chest pain or shortness of breath.    Chronic venous stasis dermatitis of both lower extremities  Chronic problem for two months.    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 Dementia is moderate. The patient does not have signs of behavioral disturbance. Home dementia medications are Held or Continued: patient does not take any medications specific to dementia . Continue non-pharmacologic interventions to prevent delirium (No VS between 11PM-5AM, activity during day, opening blinds, providing glasses/hearing aids, and up in chair during daytime). Will avoid narcotics and benzos unless absolutely necessary. PRN anti-psychotics are not prescribed to avoid self harm behaviors.    D/W with daughter and CM.  VTE Risk Mitigation (From admission, onward)           Ordered     enoxaparin injection 40 mg  Every 12 hours         04/23/24 7121     IP VTE HIGH RISK PATIENT  Once         04/22/24 1909     Place  sequential compression device  Until discontinued         04/22/24 1909                    Discharge Planning   MAIRA: 4/26/2024     Code Status: Full Code   Is the patient medically ready for discharge?:     Reason for patient still in hospital (select all that apply): Patient trending condition and Consult recommendations  Discharge Plan A: Skilled Nursing Facility                  Dorothy Ivy MD  Department of Hospital Medicine   [facility]/Surg

## 2024-04-25 NOTE — PT/OT/SLP PROGRESS
Occupational Therapy   Treatment    Name: Ghazala Hagen  MRN: [id number]  Admitting Diagnosis:  Pneumonia       Recommendations:     Discharge Recommendations: Moderate Intensity Therapy  Discharge Equipment Recommendations:   (TBD)  Barriers to discharge:  Decreased caregiver support    Assessment:     Ghazala Hagen is a 88 y.o. female with a medical diagnosis of Pneumonia. Performance deficits affecting function are weakness, impaired endurance, impaired self care skills, impaired functional mobility, gait instability, impaired balance, decreased lower extremity function, edema, impaired cognition.     Rehab Prognosis:  Fair; patient would benefit from acute skilled OT services to address these deficits and reach maximum level of function.       Plan:     Patient to be seen 6 x/week to address the above listed problems via self-care/home management, therapeutic activities, therapeutic exercises  Plan of Care Expires: 05/21/24  Plan of Care Reviewed with: patient    Subjective     Chief Complaint: none  Patient/Family Comments/goals: none  Pain/Comfort:  Pain Rating 1: 0/10  Pain Rating Post-Intervention 1: 0/10    Objective:     Communicated with: nurse Demarco prior to session.  Patient found HOB elevated with PureWick, peripheral IV, telemetry upon OT entry to room.    General Precautions: Standard, fall    Orthopedic Precautions:N/A  Braces: N/A  Respiratory Status: Room air     Occupational Performance:     Bed Mobility:    Patient completed Scooting/Bridging with maximal assistance  Patient completed Supine to Sit with maximal assistance  Patient completed Sit to Supine with maximal assistance     Functional Mobility/Transfers:  Functional Mobility: Good static sitting balance    Activities of Daily Living:  Feeding:  stand by assistance with eating lunch while seated EOB  Toileting: dependence with Purewick in place      [facility] 6 Click ADL:        Patient left HOB elevated with all lines intact, call button in  reach, and nurse and  family present    GOALS:   Multidisciplinary Problems       Occupational Therapy Goals          Problem: Occupational Therapy    Goal Priority Disciplines Outcome Interventions   Occupational Therapy Goal     OT, PT/OT Progressing    Description: Goals to be met by: 5/21/2024     Patient will increase functional independence with ADLs by performing:    Feeding with Minimal Assistance.  UE Dressing with Moderate Assistance.  Grooming while seated with Moderate Assistance.  Sitting at edge of bed x 15 minutes with Moderate Assistance.  Rolling to Bilateral with Moderate Assistance.                          Time Tracking:     OT Date of Treatment: 04/25/24  OT Start Time: 1134  OT Stop Time: 1200  OT Total Time (min): 26 min    Billable Minutes:Self Care/Home Management 26    OT/ANTON[place]ETTA: OT          4/25/2024

## 2024-04-25 NOTE — PROGRESS NOTES
Pharmacokinetic Assessment Follow Up: IV Vancomycin    Vancomycin serum concentration assessment(s):    The random level was drawn correctly and can be used to guide therapy at this time. The measurement is within the desired definitive target range of 15 to 20 mcg/mL.    Vancomycin Regimen Plan:    Pharmacy will dose by level.   Pharmacy will dose vancomycin 1000 mg x1.  A vancomycin level will be ordered on 04/26/24 at 0100.       Drug levels (last 3 results):  Recent Labs   Lab Result Units 04/24/24  0949 04/25/24  0400   Vancomycin, Random ug/mL 13.2 17.8       Pharmacy will continue to follow and monitor vancomycin.    Please contact pharmacy at extension [phone] for questions regarding this assessment.    Thank you for the consult,   Pascual Miller       Patient brief summary:  Ghazala Hagen is a 88 y.o. female initiated on antimicrobial therapy with IV Vancomycin for treatment of lower respiratory infection    The patient's current regimen is pulse dosing    Drug Allergies:   Review of patient's allergies indicates:  No Known Allergies    Actual Body Weight:   124.8kg    Renal Function:   Estimated Creatinine Clearance: 72.8 mL/min (based on SCr of 0.7 mg/dL).,     CBC (last 72 hours):  Recent Labs   Lab Result Units 04/22/24  1607 04/23/24  0126 04/24/24  0438 04/25/24  0400   WBC K/uL 9.39 8.44 8.70 5.88   Hemoglobin g/dL 14.7 12.7 12.3 12.9   Hematocrit % 46.8 40.8 39.2 42.0   Platelets K/uL 240 228 174 188   Gran % % 70.0 68.4 71.9 52.3   Lymph % % 18.1 16.7* 11.7* 27.7   Mono % % 7.1 9.6 10.9 10.9   Eosinophil % % 2.0 2.7 3.0 5.3   Basophil % % 0.7 0.5 0.5 1.2   Differential Method  Automated Automated Automated Automated       Metabolic Panel (last 72 hours):  Recent Labs   Lab Result Units 04/22/24  1607 04/22/24  1719 04/23/24  0126 04/24/24  0438 04/25/24  0400   Sodium mmol/L 131*  --  133* 132* 135*   Potassium mmol/L 4.9  --  3.8 3.6 3.4*   Chloride mmol/L 95  --  98 99 101   CO2 mmol/L 24  --  26 23  25   Glucose mg/dL 122*  --  101 89 77   Glucose, UA   --  Negative  --   --   --    BUN mg/dL 29*  --  22 15 10   Creatinine mg/dL 1.0  --  0.8 0.8 0.7   Albumin g/dL 2.8*  --   --   --   --    Total Bilirubin mg/dL 1.1*  --   --   --   --    Alkaline Phosphatase U/L 177*  --   --   --   --    AST U/L 50*  --   --   --   --    ALT U/L 36  --   --   --   --    Magnesium mg/dL  --   --  1.6 1.6 1.6   Phosphorus mg/dL  --   --  3.9 3.6 3.1       Vancomycin Administrations:  vancomycin given in the last 96 hours                     vancomycin 1,250 mg in dextrose 5 % (D5W) 250 mL IVPB (Vial-Mate) (mg) 1,250 mg New Bag 04/24/24 1231    vancomycin (VANCOCIN) 2,000 mg in dextrose 5 % (D5W) 500 mL IVPB (mg) 2,000 mg New Bag 04/23/24 1656                    Microbiologic Results:  Microbiology Results (last 7 days)       Procedure Component Value Units Date/Time    Blood culture x two cultures. Draw prior to antibiotics. [[id number]] Collected: 04/22/24 1750    Order Status: Completed Specimen: Blood from Peripheral, Forearm, Left Updated: 04/24/24 2032     Blood Culture, Routine No Growth to date      No Growth to date      No Growth to date    Narrative:      Aerobic and anaerobic    Blood Culture #1 [[id number]] Collected: 04/22/24 1607    Order Status: Completed Specimen: Blood from Peripheral, Forearm, Left Updated: 04/24/24 2032     Blood Culture, Routine No Growth to date      No Growth to date      No Growth to date    Culture, Respiratory [[id number]]     Order Status: No result Specimen: Respiratory from Sputum     Influenza A & B by Molecular [[id number]] Collected: 04/22/24 1753    Order Status: Completed Specimen: Nasopharyngeal Swab Updated: 04/22/24 1823     Influenza A, Molecular Negative     Influenza B, Molecular Negative     Flu A & B Source Nasal swab    Blood culture x two cultures. Draw prior to antibiotics. [[id number]]     Order Status: Canceled Specimen: Blood from Peripheral, Forearm, Left

## 2024-04-25 NOTE — PLAN OF CARE
Problem: Adult Inpatient Plan of Care  Goal: Plan of Care Review  Outcome: Progressing     Problem: Bariatric Environmental Safety  Goal: Safety Maintained with Care  Outcome: Progressing     Problem: Fluid Imbalance (Pneumonia)  Goal: Fluid Balance  Outcome: Progressing     Problem: Infection (Pneumonia)  Goal: Resolution of Infection Signs and Symptoms  Outcome: Progressing     Problem: Fall Injury Risk  Goal: Absence of Fall and Fall-Related Injury  Outcome: Progressing     Problem: Respiratory Compromise (Pneumonia)  Goal: Effective Oxygenation and Ventilation  Outcome: Progressing     Problem: Impaired Wound Healing  Goal: Optimal Wound Healing  Outcome: Progressing     Problem: Skin Injury Risk Increased  Goal: Skin Health and Integrity  Outcome: Progressing

## 2024-04-25 NOTE — SUBJECTIVE & OBJECTIVE
Interval History:  Patient is continuing to improve. Patient's daughter is present at bedside. Patient with bilateral chronic lower extremity venous stasis ulcers.  Denies any new focal subjective complaints.  Reports occasional cough but no swallowing dysfunction.  Patient's buttocks have deep tissue injury prior of arrival which is being managed by wound care nurse.    Review of Systems   Reason unable to perform ROS: ROS largely performed by daughter at bedside due to patient dementia.   Constitutional:  Positive for activity change and fatigue.   HENT:  Positive for congestion and postnasal drip.    Respiratory:  Positive for cough.         Non productive   Cardiovascular:  Positive for leg swelling.   Musculoskeletal:  Positive for gait problem.   Skin:  Positive for color change and wound.   Psychiatric/Behavioral:  Positive for confusion and hallucinations.         Per daughter patient having hallucinations and increased confusion     Objective:     Vital Signs (Most Recent):  Temp: 97.8 °F (36.6 °C) (04/25/24 0820)  Pulse: 77 (04/25/24 0823)  Resp: 14 (04/25/24 0820)  BP: (!) 109/58 (04/25/24 0823)  SpO2: (!) 94 % (04/25/24 0820) Vital Signs (24h Range):  Temp:  [97.8 °F (36.6 °C)-98.8 °F (37.1 °C)] 97.8 °F (36.6 °C)  Pulse:  [65-86] 77  Resp:  [14-19] 14  SpO2:  [93 %-98 %] 94 %  BP: (102-115)/(53-67) 109/58     Weight: 124.8 kg (275 lb 2.2 oz)  Body mass index is 48.74 kg/m².    Intake/Output Summary (Last 24 hours) at 4/25/2024 0843  Last data filed at 4/25/2024 0605  Gross per 24 hour   Intake 1795 ml   Output 800 ml   Net 995 ml         Physical Exam  Vitals reviewed.   Constitutional:       Appearance: She is obese. She is ill-appearing.      Comments: Chronically ill appearing   HENT:      Head: Normocephalic and atraumatic.      Mouth/Throat:      Mouth: Mucous membranes are dry.      Pharynx: Oropharynx is clear.   Eyes:      General:         Right eye: Discharge present.         Left eye: Discharge  present.     Extraocular Movements: Extraocular movements intact.      Pupils: Pupils are equal, round, and reactive to light.   Cardiovascular:      Rate and Rhythm: Rhythm irregular.      Pulses: Normal pulses.   Pulmonary:      Effort: Pulmonary effort is normal.      Breath sounds: Examination of the right-middle field reveals decreased breath sounds. Examination of the left-middle field reveals decreased breath sounds. Examination of the right-lower field reveals decreased breath sounds. Examination of the left-lower field reveals decreased breath sounds. Decreased breath sounds present.   Abdominal:      General: Bowel sounds are normal. There is no distension.      Palpations: Abdomen is soft.      Tenderness: There is no abdominal tenderness.   Musculoskeletal:         General: Swelling and tenderness present.      Cervical back: Normal range of motion and neck supple.      Right lower leg: Tenderness present. 2+ Edema present.      Left lower leg: Tenderness present. 2+ Edema present.      Right ankle: Swelling present. Tenderness present.      Left ankle: Swelling present. Tenderness present.      Right foot: Swelling and tenderness present.      Left foot: Swelling and tenderness present.   Skin:     General: Skin is warm and dry.      Capillary Refill: Capillary refill takes less than 2 seconds.             Comments: Bilateral lower legs within above margins are red, swollen, crusting and painful to touch   Neurological:      Mental Status: She is lethargic and confused.      GCS: GCS eye subscore is 3. GCS verbal subscore is 4. GCS motor subscore is 6.      Comments: Wakes to voice and obeys commands. Oriented to self and that she is in a hospital for an infection. Recognizes daughter at bedside             Significant Labs: All pertinent labs within the past 24 hours have been reviewed.  CBC:   Recent Labs   Lab 04/24/24  0438 04/25/24  0400   WBC 8.70 5.88   HGB 12.3 12.9   HCT 39.2 42.0    188      CMP:   Recent Labs   Lab 04/24/24  0438 04/25/24  0400   * 135*   K 3.6 3.4*   CL 99 101   CO2 23 25   GLU 89 77   BUN 15 10   CREATININE 0.8 0.7   CALCIUM 8.5* 8.2*   ANIONGAP 10 9     Microbiology Results (last 7 days)       Procedure Component Value Units Date/Time    Blood culture x two cultures. Draw prior to antibiotics. [[id number]] Collected: 04/22/24 1750    Order Status: Completed Specimen: Blood from Peripheral, Forearm, Left Updated: 04/24/24 2032     Blood Culture, Routine No Growth to date      No Growth to date      No Growth to date    Narrative:      Aerobic and anaerobic    Blood Culture #1 [[id number]] Collected: 04/22/24 1607    Order Status: Completed Specimen: Blood from Peripheral, Forearm, Left Updated: 04/24/24 2032     Blood Culture, Routine No Growth to date      No Growth to date      No Growth to date    Culture, Respiratory [[id number]]     Order Status: No result Specimen: Respiratory from Sputum     Influenza A & B by Molecular [[id number]] Collected: 04/22/24 1753    Order Status: Completed Specimen: Nasopharyngeal Swab Updated: 04/22/24 1823     Influenza A, Molecular Negative     Influenza B, Molecular Negative     Flu A & B Source Nasal swab    Blood culture x two cultures. Draw prior to antibiotics. [[id number]]     Order Status: Canceled Specimen: Blood from Peripheral, Forearm, Left           Significant Imaging:   CXR:  New increase opacification left lateral lung base and that may be due to overlapping shadows or mild atelectasis. Suggest EPA and lateral views of the chest for further evaluation if the patient's condition permits.    CT Head:  No acute intracranial finding.  Mild cerebral involutional change.  Advanced white matter disease, unchanged.

## 2024-04-25 NOTE — PLAN OF CARE
Problem: Physical Therapy  Goal: Physical Therapy Goal  Description: Goals to be met by: 2024     Patient will increase functional independence with mobility by performin. Supine to sit with Moderate Assistance  2. Sit to stand transfer with Moderate Assistance  3. Bed to chair transfer with Moderate Assistance using Rolling Walker  4. Gait  x 10 feet with Moderate Assistance using Rolling Walker.   5. Sitting at edge of bed x20 minutes with Moderate Assistance  6. Lower extremity exercise program x10-20 reps  Outcome: Progressing   Pt alert and assisting with thera ex. AAROME including isometrics

## 2024-04-25 NOTE — PROGRESS NOTES
[facility] Medicine  Progress Note    Patient Name: Ghazala Hagen  MRN: [id number]  Patient Class: IP- Inpatient   Admission Date: 4/22/2024  Length of Stay: 3 days  Attending Physician: Dorothy Ivy MD  Primary Care Provider: Lenore Hanson MD        Subjective:     Principal Problem:Pneumonia        HPI:  Ghazala Hagen is an 88 year old female with a previous medical history of HTN, Thyroid diease, dementia, and depression who was informed by PCP at [facility] to present to the emergency room for sepsis. Per blood work performed on Friday due blood pressure being in the 90's systolic she had lactic acid of 29, sodium of 128, and sed rate of 79. Her family brought her to [facility] initially to discuss options of nursing home placement. The patient currently resides in assisted living at [facility] and has a progressive need for more assistance with ADL's. Per daughter at bedside her mother sleeps most of the day and does not get of out bed and when she does she is only able to use her walker to go short distances in a room. The daughter also reports her mother has been increasingly confused with intermittent hallucinations. Currently the patient is also having bilateral lower extremity venous stasis wounds dressed by [facility] by their palliative wound care team for the past two months. Review of systems performed with daughter of patient endorsed that patient had mild upper respiratory symptoms of cough and congestion. Initial ED evaluation shows a lactic of 2.3, sodium of 131, albumin of 2.7 and elevated liver enzymes. CBC shows no leukocytosis and urine studies negative for infection. Chest xray reads as left lower lobe pneumonia and patient was initiated on azithromycin and ceftriaxone in emergency room. EKG read atrial flutter and patient on metoprolol 50mg BID but no anticoagulation per med rec performed by family. Upon examination patient wakes to voice,  obeys commands and is oriented to self and that she is in a hospital for an infection. Patient then endorses she just wants to rest. Patient to be admitted by hospital medicine for further evaluation and management.    Overview/Hospital Course:  Patient admitted to Hospital Medicine on telemetry floor where patient was maintained on intravenous antibiotic therapy.  Microbiology results closely followed.  Patient was evaluated by wound care nurse who managed patient for bilateral lower extremity venous ulcers and deep tissue injury of buttocks.  Patient worked with PT and OT.    Interval History:  Patient is more awake and responsive today.  Patient is feeling better and had a restful night.  Patient's daughter is present at bedside. Patient with bilateral chronic lower extremity venous stasis ulcers.  Denies any new focal subjective complaints.  Reports occasional cough but no swallowing dysfunction.  Patient's buttocks have deep tissue injury prior of arrival which is being managed by wound care nurse.    Review of Systems   Reason unable to perform ROS: ROS largely performed by daughter at bedside due to patient dementia.   Constitutional:  Positive for activity change and fatigue.   HENT:  Positive for congestion and postnasal drip.    Respiratory:  Positive for cough.         Non productive   Cardiovascular:  Positive for leg swelling.   Musculoskeletal:  Positive for gait problem.   Skin:  Positive for color change and wound.   Psychiatric/Behavioral:  Positive for confusion and hallucinations.         Per daughter patient having hallucinations and increased confusion     Objective:     Vital Signs (Most Recent):  Temp: 97.8 °F (36.6 °C) (04/25/24 0820)  Pulse: 77 (04/25/24 0823)  Resp: 14 (04/25/24 0820)  BP: (!) 109/58 (04/25/24 0823)  SpO2: (!) 94 % (04/25/24 0820) Vital Signs (24h Range):  Temp:  [97.8 °F (36.6 °C)-98.8 °F (37.1 °C)] 97.8 °F (36.6 °C)  Pulse:  [65-86] 77  Resp:  [14-19] 14  SpO2:  [93 %-98  %] 94 %  BP: (102-115)/(53-67) 109/58     Weight: 124.8 kg (275 lb 2.2 oz)  Body mass index is 48.74 kg/m².    Intake/Output Summary (Last 24 hours) at 4/25/2024 0843  Last data filed at 4/25/2024 0605  Gross per 24 hour   Intake 1795 ml   Output 800 ml   Net 995 ml         Physical Exam  Vitals reviewed.   Constitutional:       Appearance: She is obese. She is ill-appearing.      Comments: Chronically ill appearing   HENT:      Head: Normocephalic and atraumatic.      Mouth/Throat:      Mouth: Mucous membranes are dry.      Pharynx: Oropharynx is clear.   Eyes:      General:         Right eye: Discharge present.         Left eye: Discharge present.     Extraocular Movements: Extraocular movements intact.      Pupils: Pupils are equal, round, and reactive to light.   Cardiovascular:      Rate and Rhythm: Rhythm irregular.      Pulses: Normal pulses.   Pulmonary:      Effort: Pulmonary effort is normal.      Breath sounds: Examination of the right-middle field reveals decreased breath sounds. Examination of the left-middle field reveals decreased breath sounds. Examination of the right-lower field reveals decreased breath sounds. Examination of the left-lower field reveals decreased breath sounds. Decreased breath sounds present.   Abdominal:      General: Bowel sounds are normal. There is no distension.      Palpations: Abdomen is soft.      Tenderness: There is no abdominal tenderness.   Musculoskeletal:         General: Swelling and tenderness present.      Cervical back: Normal range of motion and neck supple.      Right lower leg: Tenderness present. 2+ Edema present.      Left lower leg: Tenderness present. 2+ Edema present.      Right ankle: Swelling present. Tenderness present.      Left ankle: Swelling present. Tenderness present.      Right foot: Swelling and tenderness present.      Left foot: Swelling and tenderness present.   Skin:     General: Skin is warm and dry.      Capillary Refill: Capillary  refill takes less than 2 seconds.             Comments: Bilateral lower legs within above margins are red, swollen, crusting and painful to touch   Neurological:      Mental Status: She is lethargic and confused.      GCS: GCS eye subscore is 3. GCS verbal subscore is 4. GCS motor subscore is 6.      Comments: Wakes to voice and obeys commands. Oriented to self and that she is in a hospital for an infection. Recognizes daughter at bedside             Significant Labs: All pertinent labs within the past 24 hours have been reviewed.  CBC:   Recent Labs   Lab 04/24/24  0438 04/25/24  0400   WBC 8.70 5.88   HGB 12.3 12.9   HCT 39.2 42.0    188     CMP:   Recent Labs   Lab 04/24/24 0438 04/25/24  0400   * 135*   K 3.6 3.4*   CL 99 101   CO2 23 25   GLU 89 77   BUN 15 10   CREATININE 0.8 0.7   CALCIUM 8.5* 8.2*   ANIONGAP 10 9     Microbiology Results (last 7 days)       Procedure Component Value Units Date/Time    Blood culture x two cultures. Draw prior to antibiotics. [[id number]] Collected: 04/22/24 1750    Order Status: Completed Specimen: Blood from Peripheral, Forearm, Left Updated: 04/24/24 2032     Blood Culture, Routine No Growth to date      No Growth to date      No Growth to date    Narrative:      Aerobic and anaerobic    Blood Culture #1 [[id number]] Collected: 04/22/24 1607    Order Status: Completed Specimen: Blood from Peripheral, Forearm, Left Updated: 04/24/24 2032     Blood Culture, Routine No Growth to date      No Growth to date      No Growth to date    Culture, Respiratory [[id number]]     Order Status: No result Specimen: Respiratory from Sputum     Influenza A & B by Molecular [[id number]] Collected: 04/22/24 1753    Order Status: Completed Specimen: Nasopharyngeal Swab Updated: 04/22/24 1823     Influenza A, Molecular Negative     Influenza B, Molecular Negative     Flu A & B Source Nasal swab    Blood culture x two cultures. Draw prior to antibiotics. [[id number]]     Order  Status: Canceled Specimen: Blood from Peripheral, Forearm, Left           Significant Imaging:   CXR:  New increase opacification left lateral lung base and that may be due to overlapping shadows or mild atelectasis. Suggest EPA and lateral views of the chest for further evaluation if the patient's condition permits.    CT Head:  No acute intracranial finding.  Mild cerebral involutional change.  Advanced white matter disease, unchanged.    Assessment/Plan:      * Pneumonia  Chest xray shows possible pneumonia. Lactic Acid from 2.3-4.1    -Trend Lactic  -Additional fluid bolus  -continue intravenous Zosyn and vancomycin  -Monitor for signs/symptoms of deterioration  -cbc/cmp/mag/phos daily       Venous ulcers of both lower extremities  Wound care nurse following and also managing deep tissue injury of buttocks as well.      ACP (advance care planning)  Advance Care Planning    Date: 04/23/2024    Living Will  During this visit, I engaged the patient  in the voluntary advance care planning process.  The patient and I reviewed the role for advance directives and their purpose in directing future healthcare if the patient's unable to speak for herself.  At this point in time, the patient does have full decision-making capacity.  We discussed different extreme health states that she could experience, and reviewed what kind of medical care she would want in those situations.  The patient communicated that if she were comatose and had little chance of a meaningful recovery, she would want machines/life-sustaining treatments used.  Patient's daughter was present during interview.  I spent a total of 16 minutes engaging the patient in this advance care planning discussion.               Moderate protein-calorie malnutrition  Most recent weight and BMI monitored-     Measurements:  Wt Readings from Last 1 Encounters:   04/23/24 124.8 kg (275 lb 2.2 oz)   Body mass index is 48.74 kg/m².    Albumin 2.7    -Inpatient consult to  nutrition         Atrial flutter  Atrial flutter seen on EKG. Patient not on anticoagulation at home.     -Continue metoprolol 50mg BID  -Telemetry monitoring    Primary hypertension  Chronic, controlled. Latest blood pressure and vitals reviewed-     Temp:  [97.2 °F (36.2 °C)-98.1 °F (36.7 °C)]   Pulse:  [68-92]   Resp:  [16-19]   BP: (100-118)/(58-83)   SpO2:  [94 %-98 %] .   Home meds for hypertension were reviewed and noted below.   Hypertension Medications               furosemide (LASIX) 20 MG tablet Take 1 tablet (20 mg total) by mouth once daily.    metoprolol tartrate (LOPRESSOR) 50 MG tablet Take 1 tablet (50 mg total) by mouth 2 (two) times daily.    spironolactone (ALDACTONE) 25 MG tablet Take 25 mg by mouth once daily.            While in the hospital, will manage blood pressure as follows; Continue home antihypertensive regimen    Will utilize p.r.n. blood pressure medication only if patient's blood pressure greater than 180/110 and she develops symptoms such as worsening chest pain or shortness of breath.    Chronic venous stasis dermatitis of both lower extremities  Chronic problem for two months.    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 Dementia is moderate. The patient does not have signs of behavioral disturbance. Home dementia medications are Held or Continued: patient does not take any medications specific to dementia . Continue non-pharmacologic interventions to prevent delirium (No VS between 11PM-5AM, activity during day, opening blinds, providing glasses/hearing aids, and up in chair during daytime). Will avoid narcotics and benzos unless absolutely necessary. PRN anti-psychotics are not prescribed to avoid self harm behaviors.      VTE Risk Mitigation (From admission, onward)           Ordered     enoxaparin injection 40 mg  Every 12 hours         04/23/24 0751     IP VTE HIGH RISK  PATIENT  Once         04/22/24 1909     Place sequential compression device  Until discontinued         04/22/24 1909                    Discharge Planning   MAIRA: 4/26/2024     Code Status: Full Code   Is the patient medically ready for discharge?:     Reason for patient still in hospital (select all that apply): {HMREASONPATIENTINHOSP:22094}  Discharge Plan A: Skilled Nursing Facility                  Dorothy Ivy MD  Department of Hospital Medicine   [facility]/Surg

## 2024-04-26 VITALS
SYSTOLIC BLOOD PRESSURE: 117 MMHG | TEMPERATURE: 97 F | HEART RATE: 76 BPM | WEIGHT: 275.13 LBS | HEIGHT: 63 IN | RESPIRATION RATE: 18 BRPM | BODY MASS INDEX: 48.75 KG/M2 | OXYGEN SATURATION: 93 % | DIASTOLIC BLOOD PRESSURE: 69 MMHG

## 2024-04-26 LAB
ANION GAP SERPL CALC-SCNC: 7 MMOL/L (ref 8–16)
BASOPHILS # BLD AUTO: 0.06 K/UL (ref 0–0.2)
BASOPHILS NFR BLD: 0.9 % (ref 0–1.9)
BUN SERPL-MCNC: 7 MG/DL (ref 8–23)
CALCIUM SERPL-MCNC: 8.3 MG/DL (ref 8.7–10.5)
CHLORIDE SERPL-SCNC: 101 MMOL/L (ref 95–110)
CO2 SERPL-SCNC: 30 MMOL/L (ref 23–29)
CREAT SERPL-MCNC: 0.6 MG/DL (ref 0.5–1.4)
DIFFERENTIAL METHOD BLD: ABNORMAL
EOSINOPHIL # BLD AUTO: 0.3 K/UL (ref 0–0.5)
EOSINOPHIL NFR BLD: 4.6 % (ref 0–8)
ERYTHROCYTE [DISTWIDTH] IN BLOOD BY AUTOMATED COUNT: 19.7 % (ref 11.5–14.5)
EST. GFR  (NO RACE VARIABLE): >60 ML/MIN/1.73 M^2
GLUCOSE SERPL-MCNC: 88 MG/DL (ref 70–110)
HCT VFR BLD AUTO: 39.7 % (ref 37–48.5)
HGB BLD-MCNC: 12.1 G/DL (ref 12–16)
IMM GRANULOCYTES # BLD AUTO: 0.1 K/UL (ref 0–0.04)
IMM GRANULOCYTES NFR BLD AUTO: 1.5 % (ref 0–0.5)
LYMPHOCYTES # BLD AUTO: 1.9 K/UL (ref 1–4.8)
LYMPHOCYTES NFR BLD: 28.9 % (ref 18–48)
MAGNESIUM SERPL-MCNC: 1.6 MG/DL (ref 1.6–2.6)
MCH RBC QN AUTO: 26.2 PG (ref 27–31)
MCHC RBC AUTO-ENTMCNC: 30.5 G/DL (ref 32–36)
MCV RBC AUTO: 86 FL (ref 82–98)
MONOCYTES # BLD AUTO: 0.8 K/UL (ref 0.3–1)
MONOCYTES NFR BLD: 11.7 % (ref 4–15)
NEUTROPHILS # BLD AUTO: 3.4 K/UL (ref 1.8–7.7)
NEUTROPHILS NFR BLD: 52.4 % (ref 38–73)
NRBC BLD-RTO: 0 /100 WBC
PHOSPHATE SERPL-MCNC: 2.9 MG/DL (ref 2.7–4.5)
PLATELET # BLD AUTO: 206 K/UL (ref 150–450)
PMV BLD AUTO: 10.2 FL (ref 9.2–12.9)
POTASSIUM SERPL-SCNC: 3.3 MMOL/L (ref 3.5–5.1)
RBC # BLD AUTO: 4.61 M/UL (ref 4–5.4)
SODIUM SERPL-SCNC: 138 MMOL/L (ref 136–145)
VANCOMYCIN SERPL-MCNC: 14.9 UG/ML
WBC # BLD AUTO: 6.47 K/UL (ref 3.9–12.7)

## 2024-04-26 PROCEDURE — 25000003 PHARM REV CODE 250: Performed by: INTERNAL MEDICINE

## 2024-04-26 PROCEDURE — 80048 BASIC METABOLIC PNL TOTAL CA: CPT

## 2024-04-26 PROCEDURE — 84100 ASSAY OF PHOSPHORUS: CPT

## 2024-04-26 PROCEDURE — 86580 TB INTRADERMAL TEST: CPT | Performed by: INTERNAL MEDICINE

## 2024-04-26 PROCEDURE — 80202 ASSAY OF VANCOMYCIN: CPT | Performed by: INTERNAL MEDICINE

## 2024-04-26 PROCEDURE — 25000003 PHARM REV CODE 250

## 2024-04-26 PROCEDURE — 63600175 PHARM REV CODE 636 W HCPCS: Performed by: INTERNAL MEDICINE

## 2024-04-26 PROCEDURE — 36415 COLL VENOUS BLD VENIPUNCTURE: CPT

## 2024-04-26 PROCEDURE — 30200315 PPD INTRADERMAL TEST REV CODE 302: Performed by: INTERNAL MEDICINE

## 2024-04-26 PROCEDURE — 83735 ASSAY OF MAGNESIUM: CPT

## 2024-04-26 PROCEDURE — 36415 COLL VENOUS BLD VENIPUNCTURE: CPT | Performed by: INTERNAL MEDICINE

## 2024-04-26 PROCEDURE — 85025 COMPLETE CBC W/AUTO DIFF WBC: CPT

## 2024-04-26 RX ORDER — AMOXICILLIN 250 MG
1 CAPSULE ORAL 2 TIMES DAILY
Start: 2024-04-26

## 2024-04-26 RX ORDER — PANTOPRAZOLE SODIUM 40 MG/1
40 TABLET, DELAYED RELEASE ORAL DAILY
Start: 2024-04-27 | End: 2025-04-27

## 2024-04-26 RX ORDER — AMOXICILLIN AND CLAVULANATE POTASSIUM 875; 125 MG/1; MG/1
1 TABLET, FILM COATED ORAL 2 TIMES DAILY
Start: 2024-04-26 | End: 2024-05-01

## 2024-04-26 RX ORDER — HYDROCODONE BITARTRATE AND ACETAMINOPHEN 5; 325 MG/1; MG/1
1 TABLET ORAL EVERY 6 HOURS PRN
Qty: 10 TABLET | Refills: 0 | Status: SHIPPED | OUTPATIENT
Start: 2024-04-26

## 2024-04-26 RX ADMIN — LEVOTHYROXINE SODIUM 200 MCG: 0.1 TABLET ORAL at 05:04

## 2024-04-26 RX ADMIN — ESCITALOPRAM OXALATE 20 MG: 10 TABLET, FILM COATED ORAL at 10:04

## 2024-04-26 RX ADMIN — PANTOPRAZOLE SODIUM 40 MG: 40 TABLET, DELAYED RELEASE ORAL at 10:04

## 2024-04-26 RX ADMIN — ENOXAPARIN SODIUM 40 MG: 40 INJECTION SUBCUTANEOUS at 10:04

## 2024-04-26 RX ADMIN — VANCOMYCIN HYDROCHLORIDE 1000 MG: 1 INJECTION, POWDER, LYOPHILIZED, FOR SOLUTION INTRAVENOUS at 03:04

## 2024-04-26 RX ADMIN — PIPERACILLIN AND TAZOBACTAM 4.5 G: 4; .5 INJECTION, POWDER, LYOPHILIZED, FOR SOLUTION INTRAVENOUS; PARENTERAL at 09:04

## 2024-04-26 RX ADMIN — DOCUSATE SODIUM AND SENNOSIDES 1 TABLET: 8.6; 5 TABLET, FILM COATED ORAL at 10:04

## 2024-04-26 RX ADMIN — TUBERCULIN PURIFIED PROTEIN DERIVATIVE 5 UNITS: 5 INJECTION, SOLUTION INTRADERMAL at 01:04

## 2024-04-26 RX ADMIN — FUROSEMIDE 20 MG: 20 TABLET ORAL at 10:04

## 2024-04-26 RX ADMIN — METOPROLOL TARTRATE 50 MG: 50 TABLET, FILM COATED ORAL at 10:04

## 2024-04-26 RX ADMIN — SPIRONOLACTONE 25 MG: 25 TABLET ORAL at 10:04

## 2024-04-26 NOTE — PLAN OF CARE
Problem: Adult Inpatient Plan of Care  Goal: Plan of Care Review  Outcome: Progressing     Problem: Bariatric Environmental Safety  Goal: Safety Maintained with Care  Outcome: Progressing     Problem: Infection (Pneumonia)  Goal: Resolution of Infection Signs and Symptoms  Outcome: Progressing     Problem: Respiratory Compromise (Pneumonia)  Goal: Effective Oxygenation and Ventilation  Outcome: Progressing     Problem: Fall Injury Risk  Goal: Absence of Fall and Fall-Related Injury  Outcome: Progressing     Problem: Impaired Wound Healing  Goal: Optimal Wound Healing  Outcome: Progressing     Problem: Skin Injury Risk Increased  Goal: Skin Health and Integrity  Outcome: Progressing

## 2024-04-26 NOTE — DISCHARGE INSTRUCTIONS
[place] [facility]/Surg  Facility Transfer Orders        Admit to: [facility]    Diagnoses:   Active Hospital Problems    Diagnosis  POA    *Pneumonia [J18.9]  Yes    Venous ulcers of both lower extremities [I83.019, L97.919, I83.029, L97.929]  Yes    ACP (advance care planning) [Z71.89]  Not Applicable    Atrial flutter [I48.92]  Yes    Moderate protein-calorie malnutrition [E44.0]  Yes    Chronic venous stasis dermatitis of both lower extremities [I87.2]  Yes    Primary hypertension [I10]  Yes    Dementia [F03.90]  Yes      Resolved Hospital Problems   No resolved problems to display.     Allergies: Review of patient's allergies indicates:  No Known Allergies    Code Status: Full code    Vitals: Routine       Diet: cardiac diet. Boost Plus can with meals    Activity: Up with assist, rotate patient q 2 hrs, fall precautions    Nursing Precautions: Aspiration , Fall, Seizure, and Pressure ulcer prevention    Bed/Surface: Pressure Redistribution    Consults: PT to evaluate and treat- 5 times a week and OT to evaluate and treat- 5 times a week    Oxygen: Use supplemental oxygen to keep PO2 above 92% as needed.    Dialysis: Patient is not on dialysis.     Labs: Check CBC and CMP weekly x 2.       Pending Diagnostic Studies:       None          Imaging: None    Miscellaneous Care:   Routine Skin for Bedridden Patients:  Apply moisture barrier cream to all  Wound Care: yes:  Clean body with hibiclens soap and water and dry thoroughly. Apply a thin layer of Triad to all areas of folds-breasts, abd, axilla,flanks, groins, bilateral buttocks, bilateral posterior thighs and perineum. Leave these areas open to air. Apply every shift.    Clean bilateral lower legs with hibiclens soap and rinse with wound cleanser and pat dry. Apply Triad to bilateral lower legs then cover with vaseline gauze and  abd pads and secure with kerlix every Mon and Thursdays    IV Access: None     Medications: Discontinue all previous  medication orders, if any. See new list below.  Current Discharge Medication List        START taking these medications    Details   amoxicillin-clavulanate 875-125mg (AUGMENTIN) 875-125 mg per tablet Take 1 tablet by mouth 2 (two) times daily. for 5 days      pantoprazole (PROTONIX) 40 MG tablet Take 1 tablet (40 mg total) by mouth once daily.      senna-docusate 8.6-50 mg (PERICOLACE) 8.6-50 mg per tablet Take 1 tablet by mouth 2 (two) times daily.           CONTINUE these medications which have CHANGED    Details   HYDROcodone-acetaminophen (NORCO) 5-325 mg per tablet Take 1 tablet by mouth every 6 (six) hours as needed for Pain.  Qty: 10 tablet, Refills: 0    Comments: Quantity prescribed more than 7 day supply? Yes, quantity medically necessary           CONTINUE these medications which have NOT CHANGED    Details   EScitalopram oxalate (LEXAPRO) 20 MG tablet Take 1 tablet (20 mg total) by mouth once daily.  Qty: 90 tablet, Refills: 3    Associated Diagnoses: Mild episode of recurrent major depressive disorder      furosemide (LASIX) 20 MG tablet Take 1 tablet (20 mg total) by mouth once daily.  Qty: 90 tablet, Refills: 1    Associated Diagnoses: Chronic venous stasis dermatitis of both lower extremities      KRILL OIL ORAL Take 1 capsule by mouth once daily.      levothyroxine (SYNTHROID) 200 MCG tablet Take 1 tablet (200 mcg total) by mouth before breakfast.  Qty: 90 tablet, Refills: 3      LORazepam (ATIVAN) 1 MG tablet Take 1 mg by mouth every 6 (six) hours as needed for Anxiety.      meclizine (ANTIVERT) 25 mg tablet Take 1 tablet (25 mg total) by mouth 3 (three) times daily as needed for Dizziness.  Qty: 60 tablet, Refills: 2    Associated Diagnoses: Dizziness      metoprolol tartrate (LOPRESSOR) 50 MG tablet Take 1 tablet (50 mg total) by mouth 2 (two) times daily.  Qty: 180 tablet, Refills: 3    Comments: .  Associated Diagnoses: Primary hypertension      multivitamin (ONE DAILY MULTIVITAMIN) per tablet  Take 1 tablet by mouth once daily.      mupirocin (BACTROBAN) 2 % ointment Apply topically 2 (two) times daily. Clean legs daily and apply mupirocin ointment  Qty: 60 g, Refills: 5    Associated Diagnoses: Lymphedema; Chronic venous stasis dermatitis of both lower extremities      nystatin (MYCOSTATIN) powder Apply topically 4 (four) times daily.  Qty: 120 g, Refills: 5    Associated Diagnoses: Candidiasis of skin      ondansetron (ZOFRAN-ODT) 4 MG TbDL Take 4 mg by mouth every 8 (eight) hours as needed.      spironolactone (ALDACTONE) 25 MG tablet Take 25 mg by mouth once daily.      potassium chloride (K-TAB) 20 mEq Take 1 tablet (20 mEq total) by mouth once daily.  Qty: 90 tablet, Refills: 1    Associated Diagnoses: Chronic venous stasis dermatitis of both lower extremities      traZODone (DESYREL) 50 MG tablet Take 1 tablet (50 mg total) by mouth every evening.  Qty: 30 tablet, Refills: 3           STOP taking these medications       morphine 100 mg/5 mL (20 mg/mL) concentrated solution Comments:   Reason for Stopping:         fluconazole (DIFLUCAN) 100 MG tablet Comments:   Reason for Stopping:             Follow up:    Follow-up Information       Lenore Hanson MD Follow up in 2 week(s).    Specialty: Internal Medicine  Contact information:  [address] [place] [address]  [address]  [facility] [address]  [phone]                               Immunizations Administered as of 4/26/2024       No immunizations on file.                Dorothy Ivy MD

## 2024-04-26 NOTE — PROGRESS NOTES
Ghazala Hagen [id number] is a 88 y.o. female who has been consulted for vancomycin dosing.    Pharmacy consult for vancomycin dosing in no longer required.  Vancomycin was discontinued.    Thank you for allowing us to participate in this patient's care.     Raciel Ramsay, PharmD  [phone]

## 2024-04-26 NOTE — PLAN OF CARE
Pt cleared for discharge from case management to [facility]    Nurse can call report to [phone], ask for nurse on [address] and ambulance pickup scheduled for 2pm.  [facility] nurse notified.     04/26/24 1211   Final Note   Assessment Type Final Discharge Note   Anticipated Discharge Disposition SNF   Post-Acute Status   Post-Acute Authorization Placement   Post-Acute Placement Status Set-up Complete/Auth obtained

## 2024-04-26 NOTE — DISCHARGE SUMMARY
[facility] Medicine  Discharge Summary      Patient Name: Ghazala Hagen  MRN: [id number]  CORY: [id number]  Patient Class: IP- Inpatient  Admission Date: 4/22/2024  Hospital Length of Stay: 4 days  Discharge Date and Time:  04/26/2024 10:11 AM  Attending Physician: Dorothy Ivy MD   Discharging Provider: Dorothy Ivy MD  Primary Care Provider: Lenore Hanson MD    Primary Care Team: Networked reference to record PCT     HPI:   Ghazala Hagen is an 88 year old female with a previous medical history of HTN, Thyroid diease, dementia, and depression who was informed by PCP at [facility] to present to the emergency room for sepsis. Per blood work performed on Friday due blood pressure being in the 90's systolic she had lactic acid of 29, sodium of 128, and sed rate of 79. Her family brought her to [facility] initially to discuss options of nursing home placement. The patient currently resides in assisted living at [facility] and has a progressive need for more assistance with ADL's. Per daughter at bedside her mother sleeps most of the day and does not get of out bed and when she does she is only able to use her walker to go short distances in a room. The daughter also reports her mother has been increasingly confused with intermittent hallucinations. Currently the patient is also having bilateral lower extremity venous stasis wounds dressed by [facility] by their palliative wound care team for the past two months. Review of systems performed with daughter of patient endorsed that patient had mild upper respiratory symptoms of cough and congestion. Initial ED evaluation shows a lactic of 2.3, sodium of 131, albumin of 2.7 and elevated liver enzymes. CBC shows no leukocytosis and urine studies negative for infection. Chest xray reads as left lower lobe pneumonia and patient was initiated on azithromycin and ceftriaxone in emergency room. EKG read atrial flutter and patient  on metoprolol 50mg BID but no anticoagulation per med rec performed by family. Upon examination patient wakes to voice, obeys commands and is oriented to self and that she is in a hospital for an infection. Patient then endorses she just wants to rest. Patient to be admitted by hospital medicine for further evaluation and management.    * No surgery found *      Hospital Course:   Patient admitted to Hospital Medicine on telemetry floor where patient was maintained on intravenous antibiotic therapy.  Microbiology results closely followed.  Patient was evaluated by wound care nurse who managed patient for bilateral lower extremity venous ulcers and deep tissue injury of buttocks.  Patient worked with PT and OT.  Patient encouraged to improve oral protein intake.  One of the blood culture was noted to be positive for Gram-positive rods which was thought to be contamination type.  Patient has been accepted at a local skilled nursing facility for further management and care.  Discharge plan in detail was discussed with patient's daughter at bedside who voiced understanding.    Goals of Care Treatment Preferences:  Code Status: Full Code      Consults:   Consults (From admission, onward)          Status Ordering Provider     Inpatient consult to Registered Dietitian/Nutritionist  Once        Provider:  (Not yet assigned)    Completed SHELDON VARGAS     Inpatient consult to Social Work/Case Management  Once        Provider:  (Not yet assigned)    Completed SHELDON VARGAS          Microbiology Results (last 7 days)       Procedure Component Value Units Date/Time    Rapid Organism ID by PCR (from Blood culture) [[id number]] Collected: 04/22/24 1750    Order Status: Completed Updated: 04/25/24 2231     Enterococcus faecalis Not Detected     Enterococcus faecium Not Detected     Listeria monocytogenes Not Detected     Staphylococcus spp. Not Detected     Staphylococcus aureus Not Detected     Staphylococcus epidermidis  Not Detected     Staphylococcus lugdunensis Not Detected     Streptococcus species Not Detected     Streptococcus agalactiae Not Detected     Streptococcus pneumoniae Not Detected     Streptococcus pyogenes Not Detected     Acinetobacter calcoaceticus/baumannii complex Not Detected     Bacteroides fragilis Not Detected     Enterobacterales Not Detected     Enterobacter cloacae complex Not Detected     Escherichia coli Not Detected     Klebsiella aerogenes Not Detected     Klebsiella oxytoca Not Detected     Klebsiella pneumoniae group Not Detected     Proteus Not Detected     Salmonella sp Not Detected     Serratia marcescens Not Detected     Haemophilus influenzae Not Detected     Neisseria meningtidis Not Detected     Pseudomonas aeruginosa Not Detected     Stenotrophomonas maltophilia Not Detected     Candida albicans Not Detected     Candida auris Not Detected     Candida glabrata Not Detected     Candida krusei Not Detected     Candida parapsilosis Not Detected     Candida tropicalis Not Detected     Cryptococcus neoformans/gattii Not Detected     CTX-M (ESBL ) Test not applicable     IMP (Carbapenem resistant) Test not applicable     KPC resistance gene (Carbapenem resistant) Test not applicable     mcr-1  Test not applicable     mec A/C  Test not applicable     mec A/C and MREJ (MRSA) gene Test not applicable     NDM (Carbapenem resistant) Test not applicable     OXA-48-like (Carbapenem resistant) Test not applicable     van A/B (VRE gene) Test not applicable     VIM (Carbapenem resistant) Test not applicable    Narrative:      Aerobic and anaerobic    Blood culture x two cultures. Draw prior to antibiotics. [[id number]] Collected: 04/22/24 1750    Order Status: Completed Specimen: Blood from Peripheral, Forearm, Left Updated: 04/25/24 2228     Blood Culture, Routine Gram stain peds bottle:      Gram positive rods      Results called to and read back by:      Vanessa Wells RN [facility] 4/25/24 @  2227 [facility]    Narrative:      Aerobic and anaerobic    Blood Culture #1 [[id number]] Collected: 04/22/24 1607    Order Status: Completed Specimen: Blood from Peripheral, Forearm, Left Updated: 04/25/24 2032     Blood Culture, Routine No Growth to date      No Growth to date      No Growth to date      No Growth to date    MRSA Screen by PCR [[id number]] Collected: 04/25/24 1259    Order Status: Completed Specimen: Nasal Swab Updated: 04/25/24 1727     MRSA SCREEN BY PCR Negative    Culture, Respiratory [[id number]]     Order Status: Canceled Specimen: Respiratory from Sputum     Influenza A & B by Molecular [[id number]] Collected: 04/22/24 1753    Order Status: Completed Specimen: Nasopharyngeal Swab Updated: 04/22/24 1823     Influenza A, Molecular Negative     Influenza B, Molecular Negative     Flu A & B Source Nasal swab    Blood culture x two cultures. Draw prior to antibiotics. [[id number]]     Order Status: Canceled Specimen: Blood from Peripheral, Forearm, Left           CXR:  New increase opacification left lateral lung base and that may be due to overlapping shadows or mild atelectasis. Suggest EPA and lateral views of the chest for further evaluation if the patient's condition permits.     CT Head:  No acute intracranial finding.  Mild cerebral involutional change.  Advanced white matter disease, unchanged.    Final Active Diagnoses:    Diagnosis Date Noted POA    PRINCIPAL PROBLEM:  Pneumonia [J18.9] 04/22/2024 Yes    Venous ulcers of both lower extremities [I83.019, L97.919, I83.029, L97.929] 04/24/2024 Yes    ACP (advance care planning) [Z71.89] 04/23/2024 Not Applicable    Atrial flutter [I48.92] 04/22/2024 Yes    Moderate protein-calorie malnutrition [E44.0] 04/22/2024 Yes    Chronic venous stasis dermatitis of both lower extremities [I87.2] 09/13/2023 Yes    Primary hypertension [I10] 09/13/2023 Yes    Dementia [F03.90] 12/06/2022 Yes      Problems Resolved During this Admission:       Discharged  Condition: good    Disposition: Skilled Nursing Facility    Follow Up:   Follow-up Information       Lenore Hanson MD Follow up in 2 week(s).    Specialty: Internal Medicine  Contact information:  [facility]  [address]  [place] [address]  [phone]                           Patient Instructions:      Diet Cardiac   Order Comments: Boost Plus can with meals     Notify your health care provider if you experience any of the following:  temperature >100.4     Notify your health care provider if you experience any of the following:  persistent nausea and vomiting or diarrhea     Notify your health care provider if you experience any of the following:  severe uncontrolled pain     Notify your health care provider if you experience any of the following:  redness, tenderness, or signs of infection (pain, swelling, redness, odor or green/yellow discharge around incision site)     Notify your health care provider if you experience any of the following:  difficulty breathing or increased cough     Notify your health care provider if you experience any of the following:  severe persistent headache     Notify your health care provider if you experience any of the following:  worsening rash     Notify your health care provider if you experience any of the following:  persistent dizziness, light-headedness, or visual disturbances     Notify your health care provider if you experience any of the following:  increased confusion or weakness     Change dressing (specify)   Order Comments: Dressing change:   Clean body with hibiclens soap and water and dry thoroughly. Apply a thin layer of Triad to all areas of folds-breasts, abd, axilla,flanks, groins, bilateral buttocks, bilateral posterior thighs and perineum. Leave these areas open to air. Apply every shift.     Clean bilateral lower legs with hibiclens soap and rinse with wound cleanser and pat dry. Apply Triad to bilateral lower legs then cover with vaseline gauze  "and  abd pads and secure with kerlix every Mon and Thursdays     Activity as tolerated   Order Comments: Up with assist, rotate patient every 2 hours, observe fall precautions.       Significant Diagnostic Studies: Labs: CMP   Recent Labs   Lab 04/25/24  0400 04/26/24  0311   * 138   K 3.4* 3.3*    101   CO2 25 30*   GLU 77 88   BUN 10 7*   CREATININE 0.7 0.6   CALCIUM 8.2* 8.3*   ANIONGAP 9 7*   , CBC   Recent Labs   Lab 04/25/24  0400 04/26/24  0311   WBC 5.88 6.47   HGB 12.9 12.1   HCT 42.0 39.7    206   , and INR No results found for: "INR", "PROTIME"  Microbiology: Blood Culture   Lab Results   Component Value Date    LABBLOO Gram stain peds bottle: 04/22/2024    LABBLOO Gram positive rods 04/22/2024    LABBLOO Results called to and read back by: 04/22/2024    ELINA Wells RN [facility] 4/25/24 @ 2227 R 04/22/2024    and Urine Culture  No results found for: "LABURIN"    Pending Diagnostic Studies:       None           Medications:  Reconciled Home Medications:      Medication List        START taking these medications      amoxicillin-clavulanate 875-125mg 875-125 mg per tablet  Commonly known as: AUGMENTIN  Take 1 tablet by mouth 2 (two) times daily. for 5 days     pantoprazole 40 MG tablet  Commonly known as: PROTONIX  Take 1 tablet (40 mg total) by mouth once daily.  Start taking on: April 27, 2024     potassium chloride 20 mEq  Commonly known as: K-TAB  Take 1 tablet (20 mEq total) by mouth once daily.     senna-docusate 8.6-50 mg 8.6-50 mg per tablet  Commonly known as: PERICOLACE  Take 1 tablet by mouth 2 (two) times daily.            CONTINUE taking these medications      EScitalopram oxalate 20 MG tablet  Commonly known as: LEXAPRO  Take 1 tablet (20 mg total) by mouth once daily.     furosemide 20 MG tablet  Commonly known as: LASIX  Take 1 tablet (20 mg total) by mouth once daily.     HYDROcodone-acetaminophen 5-325 mg per tablet  Commonly known as: NORCO  Take 1 tablet " by mouth every 6 (six) hours as needed for Pain.     KRILL OIL ORAL  Take 1 capsule by mouth once daily.     levothyroxine 200 MCG tablet  Commonly known as: SYNTHROID  Take 1 tablet (200 mcg total) by mouth before breakfast.     LORazepam 1 MG tablet  Commonly known as: ATIVAN  Take 1 mg by mouth every 6 (six) hours as needed for Anxiety.     meclizine 25 mg tablet  Commonly known as: ANTIVERT  Take 1 tablet (25 mg total) by mouth 3 (three) times daily as needed for Dizziness.     metoprolol tartrate 50 MG tablet  Commonly known as: LOPRESSOR  Take 1 tablet (50 mg total) by mouth 2 (two) times daily.     mupirocin 2 % ointment  Commonly known as: BACTROBAN  Apply topically 2 (two) times daily. Clean legs daily and apply mupirocin ointment     nystatin powder  Commonly known as: MYCOSTATIN  Apply topically 4 (four) times daily.     ondansetron 4 MG Tbdl  Commonly known as: ZOFRAN-ODT  Take 4 mg by mouth every 8 (eight) hours as needed.     ONE DAILY MULTIVITAMIN per tablet  Generic drug: multivitamin  Take 1 tablet by mouth once daily.     spironolactone 25 MG tablet  Commonly known as: ALDACTONE  Take 25 mg by mouth once daily.            STOP taking these medications      fluconazole 100 MG tablet  Commonly known as: DIFLUCAN     morphine 100 mg/5 mL (20 mg/mL) concentrated solution            ASK your doctor about these medications      traZODone 50 MG tablet  Commonly known as: DESYREL  Take 1 tablet (50 mg total) by mouth every evening.              Indwelling Lines/Drains at time of discharge:   Lines/Drains/Airways       None                   Time spent on the discharge of patient: 36 minutes         Dorothy Ivy MD  Department of Hospital Medicine  [facility]/Surg

## 2024-04-26 NOTE — PROGRESS NOTES
Pharmacokinetic Assessment Follow Up: IV Vancomycin    Vancomycin serum concentration assessment(s):    The random level was drawn correctly and can be used to guide therapy at this time. The measurement is within the desired definitive target range of 15 to 20 mcg/mL.    Vancomycin Regimen Plan:    Change regimen to Vancomycin 1000 mg IV every 18 hours with next serum trough concentration measured at 1330 prior to 3rd dose on 04/27/24    Drug levels (last 3 results):  Recent Labs   Lab Result Units 04/24/24  0949 04/25/24  0400 04/26/24  0038   Vancomycin, Random ug/mL 13.2 17.8 14.9       Pharmacy will continue to follow and monitor vancomycin.    Please contact pharmacy at extension [phone] for questions regarding this assessment.    Thank you for the consult,   Pascual Miller       Patient brief summary:  Ghazala Hagen is a 88 y.o. female initiated on antimicrobial therapy with IV Vancomycin for treatment of lower respiratory infection    Drug Allergies:   Review of patient's allergies indicates:  No Known Allergies    Actual Body Weight:   124.8kg    Renal Function:   Estimated Creatinine Clearance: 72.8 mL/min (based on SCr of 0.7 mg/dL).,     CBC (last 72 hours):  Recent Labs   Lab Result Units 04/24/24  0438 04/25/24  0400   WBC K/uL 8.70 5.88   Hemoglobin g/dL 12.3 12.9   Hematocrit % 39.2 42.0   Platelets K/uL 174 188   Gran % % 71.9 52.3   Lymph % % 11.7* 27.7   Mono % % 10.9 10.9   Eosinophil % % 3.0 5.3   Basophil % % 0.5 1.2   Differential Method  Automated Automated       Metabolic Panel (last 72 hours):  Recent Labs   Lab Result Units 04/24/24  0438 04/25/24  0400   Sodium mmol/L 132* 135*   Potassium mmol/L 3.6 3.4*   Chloride mmol/L 99 101   CO2 mmol/L 23 25   Glucose mg/dL 89 77   BUN mg/dL 15 10   Creatinine mg/dL 0.8 0.7   Magnesium mg/dL 1.6 1.6   Phosphorus mg/dL 3.6 3.1       Vancomycin Administrations:  vancomycin given in the last 96 hours                     vancomycin (VANCOCIN) 1,000 mg in  dextrose 5 % (D5W) 250 mL IVPB (Vial-Mate) (mg) 1,000 mg New Bag 04/25/24 0827    vancomycin 1,250 mg in dextrose 5 % (D5W) 250 mL IVPB (Vial-Mate) (mg) 1,250 mg New Bag 04/24/24 1231    vancomycin (VANCOCIN) 2,000 mg in dextrose 5 % (D5W) 500 mL IVPB (mg) 2,000 mg New Bag 04/23/24 1656                    Microbiologic Results:  Microbiology Results (last 7 days)       Procedure Component Value Units Date/Time    Rapid Organism ID by PCR (from Blood culture) [[id number]] Collected: 04/22/24 1750    Order Status: Completed Updated: 04/25/24 2231     Enterococcus faecalis Not Detected     Enterococcus faecium Not Detected     Listeria monocytogenes Not Detected     Staphylococcus spp. Not Detected     Staphylococcus aureus Not Detected     Staphylococcus epidermidis Not Detected     Staphylococcus lugdunensis Not Detected     Streptococcus species Not Detected     Streptococcus agalactiae Not Detected     Streptococcus pneumoniae Not Detected     Streptococcus pyogenes Not Detected     Acinetobacter calcoaceticus/baumannii complex Not Detected     Bacteroides fragilis Not Detected     Enterobacterales Not Detected     Enterobacter cloacae complex Not Detected     Escherichia coli Not Detected     Klebsiella aerogenes Not Detected     Klebsiella oxytoca Not Detected     Klebsiella pneumoniae group Not Detected     Proteus Not Detected     Salmonella sp Not Detected     Serratia marcescens Not Detected     Haemophilus influenzae Not Detected     Neisseria meningtidis Not Detected     Pseudomonas aeruginosa Not Detected     Stenotrophomonas maltophilia Not Detected     Candida albicans Not Detected     Candida auris Not Detected     Candida glabrata Not Detected     Candida krusei Not Detected     Candida parapsilosis Not Detected     Candida tropicalis Not Detected     Cryptococcus neoformans/gattii Not Detected     CTX-M (ESBL ) Test not applicable     IMP (Carbapenem resistant) Test not applicable     KPC  resistance gene (Carbapenem resistant) Test not applicable     mcr-1  Test not applicable     mec A/C  Test not applicable     mec A/C and MREJ (MRSA) gene Test not applicable     NDM (Carbapenem resistant) Test not applicable     OXA-48-like (Carbapenem resistant) Test not applicable     van A/B (VRE gene) Test not applicable     VIM (Carbapenem resistant) Test not applicable    Narrative:      Aerobic and anaerobic    Blood culture x two cultures. Draw prior to antibiotics. [[id number]] Collected: 04/22/24 1750    Order Status: Completed Specimen: Blood from Peripheral, Forearm, Left Updated: 04/25/24 2228     Blood Culture, Routine Gram stain peds bottle:      Gram positive rods      Results called to and read back by:      Vanessa Wells RN [facility] 4/25/24 @ 2227 [facility]    Narrative:      Aerobic and anaerobic    Blood Culture #1 [[id number]] Collected: 04/22/24 1607    Order Status: Completed Specimen: Blood from Peripheral, Forearm, Left Updated: 04/25/24 2032     Blood Culture, Routine No Growth to date      No Growth to date      No Growth to date      No Growth to date    MRSA Screen by PCR [[id number]] Collected: 04/25/24 1259    Order Status: Completed Specimen: Nasal Swab Updated: 04/25/24 1727     MRSA SCREEN BY PCR Negative    Culture, Respiratory [[id number]]     Order Status: No result Specimen: Respiratory from Sputum     Influenza A & B by Molecular [[id number]] Collected: 04/22/24 1753    Order Status: Completed Specimen: Nasopharyngeal Swab Updated: 04/22/24 1823     Influenza A, Molecular Negative     Influenza B, Molecular Negative     Flu A & B Source Nasal swab    Blood culture x two cultures. Draw prior to antibiotics. [[id number]]     Order Status: Canceled Specimen: Blood from Peripheral, Forearm, Left

## 2024-04-26 NOTE — PROGRESS NOTES
"Recommendations   Continue Cardiac diet but add chopped meats with gravy  Added Boost Plus Bid and Sky QD  Encourage intake during meal rounds  and monitor need for appetite stimulant  Ordered prealbumin and CRP levels  Collaborate with health care providers as needed     Goal: intake > 50% upon RD F/U visit  Nutrition Goal Status: new  Communication of RD Recs: reviewed with physician     Nutrition Related Social Determinants of Health: SDOH: Lack of adequate food.     Comments; Daughter reports that her mother lives at an assistant living facility but does not leave her room to eat in the dining room with other residents and nursing aids therefor she eats very little in her room.  Difficult to determine wt loss d/t fluid.  Denies that she has N/V but decreased appetite " for some time and increased confusion".  Last BM on 4/21. Daughter is considering nursing home placement.      Interval note:  4/26  Intake starting to improve; ranging from % intake of meals. No c/o N/V ;last BM on 4/21; notified nursing. PT is awaiting placement to [facility] for Sk[facility] nursing.  Will F/U prn.    Assessment and Plan  Nutrition Problem  Moderate Chronic Malnutrition     Related to (etiology):   Insufficient nutrient intake > 1 month and reduced ADL's; Dementia     Signs and Symptoms (as evidenced by):   Chronic wounds; intake < 50%; muscle mass loss; fluid accumulation      Interventions/Recommendations (treatment strategy):  Encourage intake; chop meats for ease in chewing; add Boost Plus BID and Sky QD; monitor need for appetite stimulant     Nutrition Diagnosis Status:   New     Malnutrition Assessment  Malnutrition Context: chronic illness  Malnutrition Level: moderate        Energy Intake (Malnutrition): less than 75% for greater than or equal to 1 month  Fluid Accumulation (Malnutrition): moderate       Anterior Thigh Region (Muscle Loss): mild depletion           Reason for Assessment  Dx: Pneumonia; Moderate " "malnutrition; atrial flutter; HTN; Dementia; Chronic venous stasis dermatitis  PMH:  depression; Hypothyroidism  Reason For Assessment: consult  Diagnosis: other (see comments)  Interdisciplinary Rounds: did not attend  Nutrition Discharge Planning: pending     Nutrition Risk Screen     Nutrition Risk Screen: reduced oral intake over the last month     Nutrition/Diet History     Patient Reported Diet/Restrictions/Preferences: general  Spiritual, Cultural Beliefs, Mandaeism Practices, Values that Affect Care: no  Food Allergies: NKFA  Factors Affecting Nutritional Intake: impaired cognitive status/motor control     Anthropometrics  No hx of wt loss  Temp: 97.7 °F (36.5 °C)  Height: 5' 3" (160 cm)  Height (inches): 63 in  Weight Method: Bed Scale  Weight: 124.8 kg (275 lb 2.2 oz)  Weight (lb): 275.14 lb  Ideal Body Weight (IBW), Female: 115 lb  % Ideal Body Weight, Female (lb): 239.25 %  BMI (Calculated): 48.8  BMI Grade: greater than 40 - morbid obesity  Lab/Procedures/Meds    Latest Reference Range & Units Most Recent   Albumin 3.5 - 5.2 g/dL 2.8 (L)  4/22/24 16:07   (L): Data is abnormally low  Pertinent Labs Reviewed: reviewed  Pertinent Medications Reviewed: reviewed; lasix; lopressor; protonix; aldactone; senna; IV zosyn;  Lactated ringers infusing @ 75 ml/hr     Physical Findings/Assessment  Bialteral lower extremity venous stasis ulcers and associated dermatitis  Jovon score: 14  Estimated/Assessed Needs     Weight Used For Calorie Calculations: 95 kg (209 lb 7 oz) (based on adjsuted wt d/t obesity)  Energy Calorie Requirements (kcal): 2000 calories daily;  20/kg  Energy Need Method: Kcal/kg  Protein Requirements: 95 gm protein daily;  1/kg  Weight Used For Protein Calculations: 95 kg (209 lb 7 oz)  Estimated Fluid Requirement Method: RDA Method  RDA Method (mL): 2000  Nutrition Prescription Ordered     Current Diet Order: Cardiac  Oral Nutrition Supplement: none     Evaluation of Received Nutrient/Fluid " Intake     Intake/Output Summary (Last 24 hours) at 4/26/2024 1123  Last data filed at 4/26/2024 0625  Gross per 24 hour   Intake 780 ml   Output 325 ml   Net 455 ml     IEnergy Calories Required: meeting needs  Protein Required: not meeting needs  Fluid Required: meeting needs  Tolerance: tolerating  % Intake of Estimated Energy Needs: 25 - 50 %  % Meal Intake: 25 - 50 %     Nutrition Risk     Level of Risk/Frequency of Follow-up: high ; 2 x week     Monitor and Evaluation     Food and Nutrient Intake: food and beverage intake  Food and Nutrient Adminstration: diet order  Knowledge/Beliefs/Attitudes: beliefs and attitudes, food and nutrition knowledge/skill  Physical Activity and Function: nutrition-related ADLs and IADLs  Anthropometric Measurements: weight change  Biochemical Data, Medical Tests and Procedures: gastrointestinal profile, glucose/endocrine profile, electrolyte and renal panel, other (specify)  Nutrition-Focused Physical Findings: overall appearance      Nutrition Follow-Up  yes

## 2024-04-27 LAB — BACTERIA BLD CULT: NORMAL

## 2024-04-29 ENCOUNTER — OUTPATIENT CASE MANAGEMENT (OUTPATIENT)
Dept: ADMINISTRATIVE | Facility: OTHER | Age: 89
End: 2024-04-29
Payer: MEDICARE

## 2024-04-29 LAB
BACTERIA BLD CULT: ABNORMAL
OHS QRS DURATION: 114 MS
OHS QTC CALCULATION: 470 MS

## 2024-04-29 NOTE — PROGRESS NOTES
04/29/24 Pt was discharged from the hospital and admitted to [facility] for SNF. Does not qualify for OPCM at this time. RN OPCM